# Patient Record
Sex: FEMALE | Race: ASIAN | NOT HISPANIC OR LATINO | Employment: UNEMPLOYED | URBAN - METROPOLITAN AREA
[De-identification: names, ages, dates, MRNs, and addresses within clinical notes are randomized per-mention and may not be internally consistent; named-entity substitution may affect disease eponyms.]

---

## 2020-12-09 ENCOUNTER — RECORDS - HEALTHEAST (OUTPATIENT)
Dept: LAB | Facility: CLINIC | Age: 30
End: 2020-12-09

## 2020-12-09 LAB
ABO/RH(D): NORMAL
ABORH REPEAT: NORMAL
HCG SERPL-ACNC: 31 MLU/ML (ref 0–4)

## 2020-12-10 ENCOUNTER — RECORDS - HEALTHEAST (OUTPATIENT)
Dept: LAB | Facility: CLINIC | Age: 30
End: 2020-12-10

## 2020-12-10 LAB — HCG SERPL-ACNC: 18 MLU/ML (ref 0–4)

## 2020-12-11 ENCOUNTER — RECORDS - HEALTHEAST (OUTPATIENT)
Dept: LAB | Facility: CLINIC | Age: 30
End: 2020-12-11

## 2020-12-11 LAB — HCG SERPL-ACNC: 10 MLU/ML (ref 0–4)

## 2021-03-08 ENCOUNTER — RECORDS - HEALTHEAST (OUTPATIENT)
Dept: LAB | Facility: CLINIC | Age: 31
End: 2021-03-08

## 2021-03-08 LAB — HCG SERPL-ACNC: 8 MLU/ML (ref 0–4)

## 2021-03-10 ENCOUNTER — RECORDS - HEALTHEAST (OUTPATIENT)
Dept: LAB | Facility: CLINIC | Age: 31
End: 2021-03-10

## 2021-03-10 LAB — HCG SERPL-ACNC: 2 MLU/ML (ref 0–4)

## 2021-11-19 DIAGNOSIS — Z11.59 ENCOUNTER FOR SCREENING FOR OTHER VIRAL DISEASES: ICD-10-CM

## 2021-11-23 ENCOUNTER — LAB (OUTPATIENT)
Dept: LAB | Facility: CLINIC | Age: 31
End: 2021-11-23
Payer: COMMERCIAL

## 2021-11-23 DIAGNOSIS — Z11.59 ENCOUNTER FOR SCREENING FOR OTHER VIRAL DISEASES: ICD-10-CM

## 2021-11-23 PROCEDURE — U0003 INFECTIOUS AGENT DETECTION BY NUCLEIC ACID (DNA OR RNA); SEVERE ACUTE RESPIRATORY SYNDROME CORONAVIRUS 2 (SARS-COV-2) (CORONAVIRUS DISEASE [COVID-19]), AMPLIFIED PROBE TECHNIQUE, MAKING USE OF HIGH THROUGHPUT TECHNOLOGIES AS DESCRIBED BY CMS-2020-01-R: HCPCS

## 2021-11-23 PROCEDURE — U0005 INFEC AGEN DETEC AMPLI PROBE: HCPCS

## 2021-11-24 LAB — SARS-COV-2 RNA RESP QL NAA+PROBE: NEGATIVE

## 2021-11-26 ENCOUNTER — HOSPITAL ENCOUNTER (OUTPATIENT)
Dept: GENERAL RADIOLOGY | Facility: CLINIC | Age: 31
Discharge: HOME | End: 2021-11-26
Attending: NURSE PRACTITIONER | Admitting: NURSE PRACTITIONER
Payer: COMMERCIAL

## 2021-11-26 DIAGNOSIS — N97.9 INFERTILITY, FEMALE: ICD-10-CM

## 2021-11-26 PROCEDURE — 255N000002 HC RX 255 OP 636: Performed by: RADIOLOGY

## 2021-11-26 PROCEDURE — 58340 CATHETER FOR HYSTEROGRAPHY: CPT

## 2021-11-26 RX ORDER — IOPAMIDOL 510 MG/ML
50 INJECTION, SOLUTION INTRAVASCULAR ONCE
Status: COMPLETED | OUTPATIENT
Start: 2021-11-26 | End: 2021-11-26

## 2021-11-26 RX ADMIN — IOPAMIDOL 12 ML: 510 INJECTION, SOLUTION INTRAVASCULAR at 14:14

## 2021-12-02 ENCOUNTER — HOSPITAL ENCOUNTER (OUTPATIENT)
Dept: MRI IMAGING | Facility: CLINIC | Age: 31
Discharge: HOME | End: 2021-12-02
Attending: OBSTETRICS & GYNECOLOGY | Admitting: OBSTETRICS & GYNECOLOGY
Payer: COMMERCIAL

## 2021-12-02 DIAGNOSIS — Q51.4 UNICORNATE UTERUS: ICD-10-CM

## 2021-12-02 PROCEDURE — A9585 GADOBUTROL INJECTION: HCPCS | Performed by: OBSTETRICS & GYNECOLOGY

## 2021-12-02 PROCEDURE — 72197 MRI PELVIS W/O & W/DYE: CPT

## 2021-12-02 PROCEDURE — 255N000002 HC RX 255 OP 636: Performed by: OBSTETRICS & GYNECOLOGY

## 2021-12-02 RX ORDER — GADOBUTROL 604.72 MG/ML
6 INJECTION INTRAVENOUS ONCE
Status: COMPLETED | OUTPATIENT
Start: 2021-12-02 | End: 2021-12-02

## 2021-12-02 RX ADMIN — GADOBUTROL 6 ML: 604.72 INJECTION INTRAVENOUS at 09:55

## 2021-12-12 ENCOUNTER — HEALTH MAINTENANCE LETTER (OUTPATIENT)
Age: 31
End: 2021-12-12

## 2021-12-27 ENCOUNTER — LAB REQUISITION (OUTPATIENT)
Dept: LAB | Facility: HOSPITAL | Age: 31
End: 2021-12-27
Payer: COMMERCIAL

## 2021-12-27 ENCOUNTER — APPOINTMENT (OUTPATIENT)
Dept: LAB | Facility: HOSPITAL | Age: 31
End: 2021-12-27
Payer: COMMERCIAL

## 2021-12-27 DIAGNOSIS — N91.2 AMENORRHEA, UNSPECIFIED: ICD-10-CM

## 2021-12-27 LAB
ABO/RH(D): NORMAL
HCG SERPL-ACNC: 709 MLU/ML (ref 0–4)
SPECIMEN EXPIRATION DATE: NORMAL

## 2021-12-27 PROCEDURE — 36415 COLL VENOUS BLD VENIPUNCTURE: CPT | Mod: ORL | Performed by: OBSTETRICS & GYNECOLOGY

## 2021-12-27 PROCEDURE — 84702 CHORIONIC GONADOTROPIN TEST: CPT | Mod: ORL | Performed by: OBSTETRICS & GYNECOLOGY

## 2021-12-27 PROCEDURE — 86901 BLOOD TYPING SEROLOGIC RH(D): CPT | Mod: ORL | Performed by: OBSTETRICS & GYNECOLOGY

## 2021-12-29 ENCOUNTER — LAB (OUTPATIENT)
Dept: LAB | Facility: CLINIC | Age: 31
End: 2021-12-29
Payer: COMMERCIAL

## 2021-12-29 DIAGNOSIS — N91.2 ABSENCE OF MENSTRUATION: Primary | ICD-10-CM

## 2021-12-29 LAB
ABO/RH(D): NORMAL
HCG SERPL-ACNC: 1276 MLU/ML (ref 0–4)
PROGEST SERPL-MCNC: 19.9 NG/ML
SPECIMEN EXPIRATION DATE: NORMAL

## 2021-12-29 PROCEDURE — 84702 CHORIONIC GONADOTROPIN TEST: CPT

## 2021-12-29 PROCEDURE — 84144 ASSAY OF PROGESTERONE: CPT

## 2021-12-29 PROCEDURE — 86901 BLOOD TYPING SEROLOGIC RH(D): CPT

## 2021-12-29 PROCEDURE — 36415 COLL VENOUS BLD VENIPUNCTURE: CPT

## 2022-01-04 LAB
HEPATITIS B SURFACE ANTIGEN (EXTERNAL): NEGATIVE
RUBELLA ANTIBODY IGG (EXTERNAL): NORMAL
VDRL (SYPHILIS) (EXTERNAL): NONREACTIVE

## 2022-02-11 ENCOUNTER — APPOINTMENT (OUTPATIENT)
Dept: ULTRASOUND IMAGING | Facility: CLINIC | Age: 32
End: 2022-02-11
Payer: COMMERCIAL

## 2022-02-11 ENCOUNTER — HOSPITAL ENCOUNTER (EMERGENCY)
Facility: CLINIC | Age: 32
Discharge: HOME | End: 2022-02-11
Admitting: NURSE PRACTITIONER
Payer: COMMERCIAL

## 2022-02-11 VITALS
HEART RATE: 60 BPM | RESPIRATION RATE: 18 BRPM | BODY MASS INDEX: 24.32 KG/M2 | DIASTOLIC BLOOD PRESSURE: 71 MMHG | SYSTOLIC BLOOD PRESSURE: 113 MMHG | TEMPERATURE: 97.4 F | OXYGEN SATURATION: 99 % | HEIGHT: 65 IN | WEIGHT: 146 LBS

## 2022-02-11 DIAGNOSIS — O20.8 SUBCHORIONIC HEMORRHAGE IN FIRST TRIMESTER: ICD-10-CM

## 2022-02-11 DIAGNOSIS — O20.9 FIRST TRIMESTER BLEEDING: ICD-10-CM

## 2022-02-11 LAB
HCG SERPL-ACNC: ABNORMAL MLU/ML (ref 0–4)
HGB BLD-MCNC: 11.6 G/DL (ref 11.7–15.7)

## 2022-02-11 PROCEDURE — 84702 CHORIONIC GONADOTROPIN TEST: CPT | Performed by: NURSE PRACTITIONER

## 2022-02-11 PROCEDURE — 85018 HEMOGLOBIN: CPT | Performed by: NURSE PRACTITIONER

## 2022-02-11 PROCEDURE — 76801 OB US < 14 WKS SINGLE FETUS: CPT

## 2022-02-11 PROCEDURE — 99284 EMERGENCY DEPT VISIT MOD MDM: CPT | Mod: 25

## 2022-02-11 PROCEDURE — 36415 COLL VENOUS BLD VENIPUNCTURE: CPT | Performed by: NURSE PRACTITIONER

## 2022-02-11 RX ORDER — PROGESTERONE 100 MG/1
CAPSULE ORAL
Status: ON HOLD | COMMUNITY
Start: 2021-12-28 | End: 2022-06-03

## 2022-02-11 ASSESSMENT — ENCOUNTER SYMPTOMS
VOMITING: 0
NAUSEA: 0
FEVER: 0

## 2022-02-11 ASSESSMENT — MIFFLIN-ST. JEOR: SCORE: 1363.13

## 2022-02-11 NOTE — ED NOTES
Patient discharged home with AVS. Will follow up with OB next week. All questions answered. Vitals stable on RA.

## 2022-02-11 NOTE — ED TRIAGE NOTES
"Pt arrives to ED with c/o vaginal bleeding with no cramping and no abdominal pain. Pt is 11 weeks pregnant with two previous miscarriages. Pt endorses to \"gush of blood with clots at 11pm\" Pt states she woke up with her pajamas soaked in blood. Since then pt endorses to only needed 1 pad since 11pm. Pt called her obgyn and told her to go to the ER for an ultrasound.   "

## 2022-02-11 NOTE — ED PROVIDER NOTES
EMERGENCY DEPARTMENT ENCOUNTER      NAME: Gil Chaudhari  AGE: 32 year old female  YOB: 1990  MRN: [id number]  EVALUATION DATE & TIME: 2022 10:57 AM    PCP: Toppin, Barbara Carnett    ED PROVIDER: ORNI Roger, CNP      Chief Complaint   Patient presents with     Vaginal Bleeding         FINAL IMPRESSION:  1. First trimester bleeding    2. Subchorionic hemorrhage in first trimester          ED COURSE & MEDICAL DECISION MAKIN:08 AM I met with the patient, obtained history, performed an initial exam, and discussed options and plan for treatment here in the ED.  12:35 PM updated patient with results. Edward Jacinto and Joe - OB.  12:43 PM Case discussed with Dr Jacinto.  12:48 PM updated patient    Pertinent Labs & Imaging studies reviewed. (See chart for details)  32 year old female presents to the Emergency Department for evaluation of [place] extra vaginal bleeding.  No associated pain.  Has been diagnosed with a subchorionic hemorrhage previously in this pregnancy.  Had changed her pad after approximately 8 hours of bleeding though had heavier bleeding last night.  She appears well and is hemodynamically stable.  Slight anemia.  hCG appropriate and ultrasound reassuring.  Continues to show some small subchorionic hemorrhage.  Case discussed with OB.  Overall, work-up at this time is reassuring.  Discussed pelvic rest and the need for OB follow-up.  Advised to call today to schedule an appointment for next week.  Advised to return for any new/worsening symptoms or other concerns.    At the conclusion of the encounter I discussed the results of all of the tests and the disposition. The questions were answered. The patient or family acknowledged understanding and was agreeable with the care plan.       MEDICATIONS GIVEN IN THE EMERGENCY:  Medications - No data to display    NEW PRESCRIPTIONS STARTED AT TODAY'S ER VISIT  New Prescriptions    No medications on file             =================================================================    HPI    Patient information was obtained from: patient    Use of Intrepreter: N/A        Gil Chaudhari is a 32 year old female with a history of miscarriage who presents for devaluation of vagina bleeding. G 3 P 0020. Currently around 11 weeks pregnant. Has noted spotting for the past 6 days. Was evaluated by her OB in clinic and diagnosed with ROMINA. Last night had heavy bleeding that soaked her pajamas. Bleeding today is lighter. No associated pelvic pain, fever, nausea, vomiting or other complaints. Called her OB clinic and advised to seek emergent evaluation.      REVIEW OF SYSTEMS   Review of Systems   Constitutional: Negative for fever.   Gastrointestinal: Negative for nausea and vomiting.   Genitourinary: Positive for vaginal bleeding. Negative for vaginal pain.   All other systems reviewed and are negative.      PAST MEDICAL HISTORY:  No past medical history on file.    PAST SURGICAL HISTORY:  No past surgical history on file.        CURRENT MEDICATIONS:    Prior to Admission Medications   Prescriptions Last Dose Informant Patient Reported? Taking?   progesterone (PROMETRIUM) 100 MG capsule   Yes No      Facility-Administered Medications: None           ALLERGIES:  No Known Allergies    FAMILY HISTORY:  No family history on file.    SOCIAL HISTORY:   Social History     Socioeconomic History     Marital status:      Spouse name: Not on file     Number of children: Not on file     Years of education: Not on file     Highest education level: Not on file   Occupational History     Not on file   Tobacco Use     Smoking status: Not on file     Smokeless tobacco: Not on file   Substance and Sexual Activity     Alcohol use: Not on file     Drug use: Not on file     Sexual activity: Not on file   Other Topics Concern     Not on file   Social History Narrative     Not on file     Social Determinants of Health     Financial Resource Strain: Not on  "file   Food Insecurity: Not on file   Transportation Needs: Not on file   Physical Activity: Not on file   Stress: Not on file   Social Connections: Not on file   Intimate Partner Violence: Not on file   Housing Stability: Not on file         VITALS:  Patient Vitals for the past 24 hrs:   BP Temp Temp src Pulse Resp SpO2 Height Weight   02/11/22 1212 113/71 -- -- 60 18 99 % -- --   02/11/22 1055 125/87 97  F (36.1  C) Temporal 94 16 100 % 1.651 m (5' 5\") 66.2 kg (146 lb)       PHYSICAL EXAM    Constitutional:  Well developed, well nourished, no acute distress  EYES: Conjunctivae clear  HENT:  Atraumatic, normocephalic  Respiratory:  No respiratory distress, normal breath sounds  Cardiovascular:  Normal rate, normal rhythm, no murmurs  GI:  Soft, nondistended, nontender, no palpable masses, no rebound, no guarding   Integument: Warm, Dry  Neurologic:  Alert & oriented x 3              LAB:  All pertinent labs reviewed and interpreted.  Results for orders placed or performed during the hospital encounter of 02/11/22   US OB < 14 Weeks Single    Impression    IMPRESSION:   1.  Single living intrauterine gestation at 11 weeks 0 days, EDC 09/02/2022.  2.  Fundal 5.7 cm uterine leiomyoma.  3.  Small subchorionic hemorrhage.       Result Value Ref Range    Hemoglobin 11.6 (L) 11.7 - 15.7 g/dL   HCG quantitative pregnancy (blood)   Result Value Ref Range    hCG Quantitative 148,282 (H) 0 - 4 mlU/mL       RADIOLOGY:  Reviewed all pertinent imaging. Please see official radiology report.  US OB < 14 Weeks Single   Final Result   IMPRESSION:    1.  Single living intrauterine gestation at 11 weeks 0 days, EDC 09/02/2022.   2.  Fundal 5.7 cm uterine leiomyoma.   3.  Small subchorionic hemorrhage.                  PROCEDURES:   None    RONI Roger, CNP  Emergency Medicine  [facility] EMERGENCY ROOM  [address] [address] [phone]  Dept: " [phone]         Agustin Mendoza, RONI CNP  02/11/22 1249       Agustin Mendoza, RONI CNP  02/11/22 1247

## 2022-02-11 NOTE — DISCHARGE INSTRUCTIONS
Discharge Instructions  Vaginal Bleeding in Pregnancy    Bleeding in early pregnancy can be a sign of a miscarriage in process or an abnormal pregnancy, but often is innocent and the pregnancy will continue normally. We may do blood pregnancy tests and ultrasound to try to determine what is causing the bleeding in your case, but sometimes we can't tell and need to follow you with time, more blood tests, and another ultrasound.     Return to the Emergency Department if:  You have severe abdominal or pelvic pain.  You faint, or feel lightheaded or dizzy.   Your bleeding is gets much worse, and is heavier than a heavy period or if you pass any blood clots larger than a quarter.  You pass any tissue--solid material that doesn't appear smooth and even like a blood clot. If you pass tissue, save it (even if you have to pull it out of the toilet) and put it in a plastic bag or jar and bring it in.    You have a fever of 100.5 degrees or higher.    You need to see your regular doctor, or an OB/GYN doctor next week. Call to schedule.  You should not have sex or put anything in your vagina.  Avoid heavy lifting or any strenuous exercise     Facts about miscarriage: We hope you don't have a miscarriage, but if you do, here are important things to know:  Early miscarriage is very common, and having one miscarriage doesn't mean you will have problems with another pregnancy.  Nothing you did caused it. Taking medicine, drinking alcohol, having sex, exercising, or falling down won't cause a miscarriage.         Remember that you can always come back to the Emergency Department if you are not able to see your regular doctor in the amount of time listed above, if you get any new symptoms, or if there is anything that worries you.

## 2022-05-12 ENCOUNTER — PRE VISIT (OUTPATIENT)
Dept: MATERNAL FETAL MEDICINE | Facility: CLINIC | Age: 32
End: 2022-05-12
Payer: COMMERCIAL

## 2022-05-12 ENCOUNTER — TRANSCRIBE ORDERS (OUTPATIENT)
Dept: MATERNAL FETAL MEDICINE | Facility: CLINIC | Age: 32
End: 2022-05-12
Payer: COMMERCIAL

## 2022-05-12 DIAGNOSIS — O26.90 PREGNANCY RELATED CONDITION, ANTEPARTUM: Primary | ICD-10-CM

## 2022-05-13 ENCOUNTER — OFFICE VISIT (OUTPATIENT)
Dept: MATERNAL FETAL MEDICINE | Facility: CLINIC | Age: 32
End: 2022-05-13
Attending: OBSTETRICS & GYNECOLOGY
Payer: COMMERCIAL

## 2022-05-13 ENCOUNTER — HOSPITAL ENCOUNTER (OUTPATIENT)
Dept: ULTRASOUND IMAGING | Facility: CLINIC | Age: 32
Discharge: HOME | End: 2022-05-13
Attending: OBSTETRICS & GYNECOLOGY
Payer: COMMERCIAL

## 2022-05-13 DIAGNOSIS — O36.5990 PREGNANCY AFFECTED BY FETAL GROWTH RESTRICTION: Primary | ICD-10-CM

## 2022-05-13 DIAGNOSIS — O26.90 PREGNANCY RELATED CONDITION, ANTEPARTUM: ICD-10-CM

## 2022-05-13 PROCEDURE — 76811 OB US DETAILED SNGL FETUS: CPT

## 2022-05-13 PROCEDURE — 59025 FETAL NON-STRESS TEST: CPT | Mod: 26 | Performed by: OBSTETRICS & GYNECOLOGY

## 2022-05-13 PROCEDURE — 76820 UMBILICAL ARTERY ECHO: CPT | Mod: 26 | Performed by: OBSTETRICS & GYNECOLOGY

## 2022-05-13 PROCEDURE — 76811 OB US DETAILED SNGL FETUS: CPT | Mod: 26 | Performed by: OBSTETRICS & GYNECOLOGY

## 2022-05-13 PROCEDURE — 99202 OFFICE O/P NEW SF 15 MIN: CPT | Mod: 25 | Performed by: OBSTETRICS & GYNECOLOGY

## 2022-05-13 PROCEDURE — 59025 FETAL NON-STRESS TEST: CPT

## 2022-05-13 NOTE — PROGRESS NOTES
"Please see \"Imaging\" tab under \"Chart Review\" for details of today's visit.    Mary Marx MD PhD  Maternal Fetal Medicine     "

## 2022-05-19 ENCOUNTER — OFFICE VISIT (OUTPATIENT)
Dept: MATERNAL FETAL MEDICINE | Facility: HOSPITAL | Age: 32
End: 2022-05-19
Attending: OBSTETRICS & GYNECOLOGY
Payer: COMMERCIAL

## 2022-05-19 ENCOUNTER — ANCILLARY PROCEDURE (OUTPATIENT)
Dept: ULTRASOUND IMAGING | Facility: HOSPITAL | Age: 32
End: 2022-05-19
Attending: OBSTETRICS & GYNECOLOGY
Payer: COMMERCIAL

## 2022-05-19 DIAGNOSIS — O36.5990 PREGNANCY AFFECTED BY FETAL GROWTH RESTRICTION: ICD-10-CM

## 2022-05-19 PROCEDURE — 99207 PR NO CHARGE LOS: CPT | Performed by: OBSTETRICS & GYNECOLOGY

## 2022-05-19 PROCEDURE — 76820 UMBILICAL ARTERY ECHO: CPT | Mod: 26 | Performed by: OBSTETRICS & GYNECOLOGY

## 2022-05-19 PROCEDURE — 76815 OB US LIMITED FETUS(S): CPT

## 2022-05-19 PROCEDURE — 76815 OB US LIMITED FETUS(S): CPT | Mod: 26 | Performed by: OBSTETRICS & GYNECOLOGY

## 2022-05-19 PROCEDURE — 59025 FETAL NON-STRESS TEST: CPT

## 2022-05-19 PROCEDURE — 59025 FETAL NON-STRESS TEST: CPT | Mod: 26 | Performed by: OBSTETRICS & GYNECOLOGY

## 2022-05-19 NOTE — PROGRESS NOTES
Please see the imaging tab for details of the ultrasound performed today.    Yesi Voss MD  Specialist in Maternal-Fetal Medicine

## 2022-05-27 ENCOUNTER — OFFICE VISIT (OUTPATIENT)
Dept: MATERNAL FETAL MEDICINE | Facility: HOSPITAL | Age: 32
End: 2022-05-27
Attending: OBSTETRICS & GYNECOLOGY
Payer: COMMERCIAL

## 2022-05-27 ENCOUNTER — ANCILLARY PROCEDURE (OUTPATIENT)
Dept: ULTRASOUND IMAGING | Facility: HOSPITAL | Age: 32
End: 2022-05-27
Attending: OBSTETRICS & GYNECOLOGY
Payer: COMMERCIAL

## 2022-05-27 DIAGNOSIS — O36.5990 PREGNANCY AFFECTED BY FETAL GROWTH RESTRICTION: ICD-10-CM

## 2022-05-27 PROCEDURE — 76815 OB US LIMITED FETUS(S): CPT

## 2022-05-27 PROCEDURE — 76815 OB US LIMITED FETUS(S): CPT | Mod: 26 | Performed by: OBSTETRICS & GYNECOLOGY

## 2022-05-27 PROCEDURE — 59025 FETAL NON-STRESS TEST: CPT | Mod: 26 | Performed by: OBSTETRICS & GYNECOLOGY

## 2022-05-27 PROCEDURE — 59025 FETAL NON-STRESS TEST: CPT

## 2022-05-27 PROCEDURE — 76820 UMBILICAL ARTERY ECHO: CPT | Mod: 26 | Performed by: OBSTETRICS & GYNECOLOGY

## 2022-05-27 PROCEDURE — 99207 PR NO CHARGE LOS: CPT | Performed by: OBSTETRICS & GYNECOLOGY

## 2022-05-27 NOTE — PROGRESS NOTES
Please see full imaging report from [employer] program under imaging tab.    Vandana Jones MD  Maternal Fetal Medicine

## 2022-06-03 ENCOUNTER — OFFICE VISIT (OUTPATIENT)
Dept: MATERNAL FETAL MEDICINE | Facility: HOSPITAL | Age: 32
End: 2022-06-03
Attending: OBSTETRICS & GYNECOLOGY
Payer: COMMERCIAL

## 2022-06-03 ENCOUNTER — ANCILLARY PROCEDURE (OUTPATIENT)
Dept: ULTRASOUND IMAGING | Facility: HOSPITAL | Age: 32
End: 2022-06-03
Attending: OBSTETRICS & GYNECOLOGY
Payer: COMMERCIAL

## 2022-06-03 ENCOUNTER — HOSPITAL ENCOUNTER (OUTPATIENT)
Facility: HOSPITAL | Age: 32
Discharge: HOME | End: 2022-06-04
Attending: OBSTETRICS & GYNECOLOGY | Admitting: OBSTETRICS & GYNECOLOGY
Payer: COMMERCIAL

## 2022-06-03 VITALS — DIASTOLIC BLOOD PRESSURE: 76 MMHG | SYSTOLIC BLOOD PRESSURE: 111 MMHG | HEART RATE: 92 BPM

## 2022-06-03 DIAGNOSIS — O36.5990 PREGNANCY AFFECTED BY FETAL GROWTH RESTRICTION: ICD-10-CM

## 2022-06-03 PROBLEM — Z36.89 ENCOUNTER FOR TRIAGE IN PREGNANT PATIENT: Status: ACTIVE | Noted: 2022-06-03

## 2022-06-03 PROCEDURE — 76816 OB US FOLLOW-UP PER FETUS: CPT | Mod: 26 | Performed by: OBSTETRICS & GYNECOLOGY

## 2022-06-03 PROCEDURE — 59025 FETAL NON-STRESS TEST: CPT | Mod: 26 | Performed by: OBSTETRICS & GYNECOLOGY

## 2022-06-03 PROCEDURE — 96372 THER/PROPH/DIAG INJ SC/IM: CPT | Performed by: OBSTETRICS & GYNECOLOGY

## 2022-06-03 PROCEDURE — 59025 FETAL NON-STRESS TEST: CPT

## 2022-06-03 PROCEDURE — 76816 OB US FOLLOW-UP PER FETUS: CPT

## 2022-06-03 PROCEDURE — 99207 PR NO CHARGE LOS: CPT | Performed by: OBSTETRICS & GYNECOLOGY

## 2022-06-03 PROCEDURE — 76820 UMBILICAL ARTERY ECHO: CPT | Mod: 26 | Performed by: OBSTETRICS & GYNECOLOGY

## 2022-06-03 PROCEDURE — 250N000011 HC RX IP 250 OP 636: Performed by: OBSTETRICS & GYNECOLOGY

## 2022-06-03 RX ORDER — BETAMETHASONE SODIUM PHOSPHATE AND BETAMETHASONE ACETATE 3; 3 MG/ML; MG/ML
12 INJECTION, SUSPENSION INTRA-ARTICULAR; INTRALESIONAL; INTRAMUSCULAR; SOFT TISSUE EVERY 24 HOURS
Status: COMPLETED | OUTPATIENT
Start: 2022-06-03 | End: 2022-06-04

## 2022-06-03 RX ORDER — LIDOCAINE 40 MG/G
CREAM TOPICAL
Status: DISCONTINUED | OUTPATIENT
Start: 2022-06-03 | End: 2022-06-04 | Stop reason: HOSPADM

## 2022-06-03 RX ORDER — BIOTIN 1 MG
1000 TABLET ORAL DAILY
Status: ON HOLD | COMMUNITY
End: 2022-08-05

## 2022-06-03 RX ORDER — ASPIRIN 81 MG/1
81 TABLET, CHEWABLE ORAL DAILY
Status: ON HOLD | COMMUNITY
End: 2022-08-05

## 2022-06-03 RX ADMIN — BETAMETHASONE ACETATE AND BETAMETHASONE SODIUM PHOSPHATE 12 MG: 3; 3 INJECTION, SUSPENSION INTRA-ARTICULAR; INTRALESIONAL; INTRAMUSCULAR; SOFT TISSUE at 19:07

## 2022-06-03 NOTE — H&P
[facility] Labor and Delivery History and Physical    Gil Chaudhari MRN# [id number]   Age: 32 year old YOB: 1990     Date of Admission:  6/3/2022    Primary care provider: Toppin, Barbara Carnett           Chief Complaint:   Gil Chaudhari is a 32 year old female who is 27w4d pregnant who is being followed by Dr. Diaz.  The patient had an ultrasound at maternal-fetal medicine today showing fetal growth restriction.  There was a normal umbilical Doppler and a normal amniotic fluid volume.  She denies any abdominal pain or bleeding or leakage of fluid.  She endorses good fetal movement.  At maternal-fetal medicine this afternoon she was being monitored and there was a deceleration was noted so the patient was sent to labor and delivery for prolonged monitoring and steroids.          Pregnancy history:     OBSTETRIC HISTORY:    OB History    Para Term  AB Living   3 0 0 0 2 0   SAB IAB Ectopic Multiple Live Births   2 0 0 0 0      # Outcome Date GA Lbr Sha/2nd Weight Sex Delivery Anes PTL Lv   3 Current            2 SAB      SAB      1 SAB      SAB          EDC: Estimated Date of Delivery: 22    Prenatal Labs:   Lab Results   Component Value Date    HGB 11.6 (L) 2022       GBS Status:   No results found for: GBS    Active Problem List  Patient Active Problem List   Diagnosis     Encounter for triage in pregnant patient       Medication Prior to Admission  Medications Prior to Admission   Medication Sig Dispense Refill Last Dose     progesterone (PROMETRIUM) 100 MG capsule       .        Maternal Past Medical History:   No past medical history on file.                    Family History:   This patient has no significant family history            Social History:   This patient has no significant social history         Review of Systems:   CONSTITUTIONAL: NEGATIVE for fever, chills, change in weight  ENT/MOUTH: NEGATIVE for ear, mouth and throat problems  RESP:  NEGATIVE for significant cough or SOB  CV: NEGATIVE for chest pain, palpitations or peripheral edema          Physical Exam:   Vitals were reviewed  Temp: 98.2  F (36.8  C) Temp src: Oral BP: 118/77 Pulse: 86   Resp: 14   O2 Device: None (Room air)    Lungs:   No increased work of breathing, good air exchange, clear to auscultation bilaterally, no crackles or wheezing     Cardiovascular:   regular rate and rhythm     Abdomen:   No scars, normal bowel sounds, soft, non-distended, non-tender, no masses palpated, no hepatosplenomegally      Cervical exam is deferred at there is no leakage of fluid  Presentation:Breech  Fetal Heart Rate Tracins with some variability noted.  No decelerations are seen  Tocometer: No contractions                       Assessment:   Gil Chaudhari is a 27w4d pregnant female admitted with IUGR.          Plan:   We will observe overnight with continuous monitoring and give steroids per recommendations from maternal-fetal medicine.  We would only move towards delivery if there was an ominous tracing.  At that point  section will be needed.  If  it looks like we are going to be needing to go towards delivery then magnesium sulfate would be given for CP prevention.  If the baby has a reassuring tracing over the course of steroids then per M she can be followed twice weekly in their office.  I discussed the plan with the patient and she is in agreement.  All questions were answered.    Haile Barajas MD

## 2022-06-03 NOTE — NURSING NOTE
NST Performed due to severe fetal growth restriction.   reviewed efm tracing. See NST/BPP Doc Flowsheet tab.    Today's NST was non reassuring for gestational age. Had prolonged decel x4 min with unknown leo d/t broken tracing. Rebound baseline was elevated with decreased variability. Will go to L&D for extended monitoring.

## 2022-06-03 NOTE — PROGRESS NOTES
Please refer to ultrasound report under 'Imaging' Studies of 'Chart Review' tabs.    Marin Mccray M.D.

## 2022-06-04 ENCOUNTER — HOSPITAL ENCOUNTER (OUTPATIENT)
Facility: HOSPITAL | Age: 32
End: 2022-06-04
Admitting: OBSTETRICS & GYNECOLOGY
Payer: COMMERCIAL

## 2022-06-04 VITALS
DIASTOLIC BLOOD PRESSURE: 64 MMHG | WEIGHT: 160.94 LBS | SYSTOLIC BLOOD PRESSURE: 106 MMHG | BODY MASS INDEX: 26.81 KG/M2 | RESPIRATION RATE: 18 BRPM | HEIGHT: 65 IN | HEART RATE: 93 BPM | TEMPERATURE: 98 F

## 2022-06-04 PROCEDURE — G0463 HOSPITAL OUTPT CLINIC VISIT: HCPCS

## 2022-06-04 PROCEDURE — 250N000011 HC RX IP 250 OP 636: Performed by: OBSTETRICS & GYNECOLOGY

## 2022-06-04 PROCEDURE — 96372 THER/PROPH/DIAG INJ SC/IM: CPT | Performed by: OBSTETRICS & GYNECOLOGY

## 2022-06-04 RX ORDER — CALCIUM CARBONATE 500 MG/1
1000 TABLET, CHEWABLE ORAL 3 TIMES DAILY PRN
Status: DISCONTINUED | OUTPATIENT
Start: 2022-06-04 | End: 2022-06-04 | Stop reason: HOSPADM

## 2022-06-04 RX ADMIN — BETAMETHASONE ACETATE AND BETAMETHASONE SODIUM PHOSPHATE 12 MG: 3; 3 INJECTION, SUSPENSION INTRA-ARTICULAR; INTRALESIONAL; INTRAMUSCULAR; SOFT TISSUE at 19:30

## 2022-06-04 NOTE — PROGRESS NOTES
FHT in 140-150 range. No decelerations. Some moderate varibiality but mostly minimal.   Abd NT and no bleeding.   Continue current course. Will deliver at this gestational age for recurrent decelerations or tachycardia or bradycardia.

## 2022-06-04 NOTE — PROGRESS NOTES
Pt sleeping and reported no contractions. Baby currently staying on the monitor. Category 1 tracing. No decels noted. Vital signs stable.

## 2022-06-04 NOTE — PROGRESS NOTES
Heather and her  present to [facility] after a clinic visit at [facility].  [facility] called and reported that upon monitoring they noted a deceleration, requested further monitoring.  Pregnancy complicated with subchorionic hemorrhage, unicornuate uterus, and uterine fibroid.      Dr Barajas in to see patient and review plan of care at 1730 after making plan with Dr Rollins from [facility].  Reviewed need for continuous monitoring for at least 6 hours, possibly longer, probably overnight.  Also plan for Betamethasone injection.  Patient and  agreeable to plan.      No contractions per Heather, no vaginal bleeding or change in vaginal discharge.  Placed on monitor, encouraged to eat and rest as able.

## 2022-06-04 NOTE — PROGRESS NOTES
"Labor and Delivery Progress Note    Gil Chaudhari MRN# [id number]   Age: 32 year old YOB: 1990           Subjective:   The patient is feeling well, lots of fetal movement           Objective:   Patient Vitals for the past 24 hrs:   BP Temp Temp src Pulse Resp Height Weight BMI (Calculated) Oximeter Heart Rate   22 0025 100/60 98.3  F (36.8  C) Oral -- 16 -- -- -- 87 bpm   22 2235 133/80 97.9  F (36.6  C) Oral 85 16 -- -- -- --   22 1712 118/77 98.2  F (36.8  C) Oral 86 14 1.651 m (5' 5\") 73 kg (160 lb 15 oz) 26.78 86 bpm         Fetal Heart Rate:    Monitor: external US    Variability: moderate (amplitude range 6 to 25 bpm)    Baseline Rate: normal range    Fetal Heart Rate Tracing: overall category 1 with occasional variable decelerations that resolve with repositioning          Assessment:   Gil Chaudhari is a 32 year old  who is 27w5d here with IUGR and decelerations on monitoring yesterday at [facility].          Plan:   Will continue observation until second dose of betamethasone tonight at 19:00.  If FHR appropriate, will discharge to home.  She has a follow up with [facility] on 22 already.      BAKARI MARK    "

## 2022-06-04 NOTE — PROGRESS NOTES
Pt doing well. Per patient she will have occasional cramp every now and then but nothing painful.  No contraction noted on monitor.    Patient transferred to room 22 for overnight stay.      Report given to Vin BARTLETT RN

## 2022-06-05 NOTE — DISCHARGE SUMMARY
DISCHARGE SUMMARY      Patient Name:  Gil Chaudhari  :      1990  MRN:      [id number]    Admission Date: 6/3/2022  Discharge Date: 2022    Patient Active Problem List   Diagnosis     Encounter for triage in pregnant patient       Conditions Complicating Pregnancy: IUGR, fetal decelerations    Primary Procedure performed: fetal monitoring, betamethasone  Other Procedures performed: none    Condition at discharge:  Stable; FHR overall category 1 with occasional variable decelerations, not repetitive     Discharge Plan:     Follow-up with Primary Obstetrician in 1 week, follow up with MFM on     Instructions:   Activity as tolerated   Regular diet   Medications: See Med Rec    Date:  22  Time:  7:14 PM

## 2022-06-06 ENCOUNTER — ANCILLARY PROCEDURE (OUTPATIENT)
Dept: ULTRASOUND IMAGING | Facility: HOSPITAL | Age: 32
End: 2022-06-06
Attending: OBSTETRICS & GYNECOLOGY
Payer: COMMERCIAL

## 2022-06-06 ENCOUNTER — OFFICE VISIT (OUTPATIENT)
Dept: MATERNAL FETAL MEDICINE | Facility: HOSPITAL | Age: 32
End: 2022-06-06
Attending: OBSTETRICS & GYNECOLOGY
Payer: COMMERCIAL

## 2022-06-06 DIAGNOSIS — O36.5990 PREGNANCY AFFECTED BY FETAL GROWTH RESTRICTION: ICD-10-CM

## 2022-06-06 PROCEDURE — 76820 UMBILICAL ARTERY ECHO: CPT | Mod: 26 | Performed by: OBSTETRICS & GYNECOLOGY

## 2022-06-06 PROCEDURE — 59025 FETAL NON-STRESS TEST: CPT

## 2022-06-06 PROCEDURE — 76815 OB US LIMITED FETUS(S): CPT | Mod: 26 | Performed by: OBSTETRICS & GYNECOLOGY

## 2022-06-06 PROCEDURE — 76815 OB US LIMITED FETUS(S): CPT

## 2022-06-06 PROCEDURE — 59025 FETAL NON-STRESS TEST: CPT | Mod: 26 | Performed by: OBSTETRICS & GYNECOLOGY

## 2022-06-06 PROCEDURE — 99207 PR NO CHARGE LOS: CPT | Performed by: OBSTETRICS & GYNECOLOGY

## 2022-06-09 ENCOUNTER — OFFICE VISIT (OUTPATIENT)
Dept: MATERNAL FETAL MEDICINE | Facility: CLINIC | Age: 32
End: 2022-06-09
Attending: OBSTETRICS & GYNECOLOGY
Payer: COMMERCIAL

## 2022-06-09 ENCOUNTER — HOSPITAL ENCOUNTER (OUTPATIENT)
Dept: ULTRASOUND IMAGING | Facility: CLINIC | Age: 32
Discharge: HOME | End: 2022-06-09
Attending: OBSTETRICS & GYNECOLOGY
Payer: COMMERCIAL

## 2022-06-09 DIAGNOSIS — O36.5990 PREGNANCY AFFECTED BY FETAL GROWTH RESTRICTION: ICD-10-CM

## 2022-06-09 PROCEDURE — 76815 OB US LIMITED FETUS(S): CPT

## 2022-06-09 PROCEDURE — 76818 FETAL BIOPHYS PROFILE W/NST: CPT | Mod: 26 | Performed by: OBSTETRICS & GYNECOLOGY

## 2022-06-09 PROCEDURE — 76818 FETAL BIOPHYS PROFILE W/NST: CPT

## 2022-06-09 PROCEDURE — 76820 UMBILICAL ARTERY ECHO: CPT | Mod: 26 | Performed by: OBSTETRICS & GYNECOLOGY

## 2022-06-10 NOTE — PROGRESS NOTES
"Please see \"Imaging\" tab under \"Chart Review\" for details of today's ultrasound.    Agustin Burns M.D.  Specialist in Maternal-Fetal Medicine     "

## 2022-06-13 ENCOUNTER — ANCILLARY PROCEDURE (OUTPATIENT)
Dept: ULTRASOUND IMAGING | Facility: HOSPITAL | Age: 32
End: 2022-06-13
Attending: OBSTETRICS & GYNECOLOGY
Payer: COMMERCIAL

## 2022-06-13 ENCOUNTER — OFFICE VISIT (OUTPATIENT)
Dept: MATERNAL FETAL MEDICINE | Facility: HOSPITAL | Age: 32
End: 2022-06-13
Attending: OBSTETRICS & GYNECOLOGY
Payer: COMMERCIAL

## 2022-06-13 DIAGNOSIS — O36.5990 PREGNANCY AFFECTED BY FETAL GROWTH RESTRICTION: ICD-10-CM

## 2022-06-13 PROCEDURE — 59025 FETAL NON-STRESS TEST: CPT | Mod: 26 | Performed by: OBSTETRICS & GYNECOLOGY

## 2022-06-13 PROCEDURE — 76820 UMBILICAL ARTERY ECHO: CPT | Mod: 26 | Performed by: OBSTETRICS & GYNECOLOGY

## 2022-06-13 PROCEDURE — 59025 FETAL NON-STRESS TEST: CPT

## 2022-06-13 PROCEDURE — 76820 UMBILICAL ARTERY ECHO: CPT

## 2022-06-13 PROCEDURE — 76815 OB US LIMITED FETUS(S): CPT | Mod: 26 | Performed by: OBSTETRICS & GYNECOLOGY

## 2022-06-17 ENCOUNTER — ANCILLARY PROCEDURE (OUTPATIENT)
Dept: ULTRASOUND IMAGING | Facility: HOSPITAL | Age: 32
End: 2022-06-17
Attending: OBSTETRICS & GYNECOLOGY
Payer: COMMERCIAL

## 2022-06-17 ENCOUNTER — OFFICE VISIT (OUTPATIENT)
Dept: MATERNAL FETAL MEDICINE | Facility: HOSPITAL | Age: 32
End: 2022-06-17
Attending: OBSTETRICS & GYNECOLOGY
Payer: COMMERCIAL

## 2022-06-17 DIAGNOSIS — O36.5990 PREGNANCY AFFECTED BY FETAL GROWTH RESTRICTION: ICD-10-CM

## 2022-06-17 DIAGNOSIS — O36.5990 PREGNANCY AFFECTED BY FETAL GROWTH RESTRICTION: Primary | ICD-10-CM

## 2022-06-17 PROCEDURE — 76815 OB US LIMITED FETUS(S): CPT | Mod: 26 | Performed by: OBSTETRICS & GYNECOLOGY

## 2022-06-17 PROCEDURE — 59025 FETAL NON-STRESS TEST: CPT

## 2022-06-17 PROCEDURE — 59025 FETAL NON-STRESS TEST: CPT | Mod: 26 | Performed by: OBSTETRICS & GYNECOLOGY

## 2022-06-17 PROCEDURE — 76820 UMBILICAL ARTERY ECHO: CPT | Mod: 26 | Performed by: OBSTETRICS & GYNECOLOGY

## 2022-06-17 PROCEDURE — 76820 UMBILICAL ARTERY ECHO: CPT

## 2022-06-17 PROCEDURE — 99207 PR NO CHARGE LOS: CPT | Performed by: OBSTETRICS & GYNECOLOGY

## 2022-06-17 NOTE — PROGRESS NOTES
"Please see \"Imaging\" tab under \"Chart Review\" for details of today's visit.    Grant Arceo    "

## 2022-06-20 ENCOUNTER — TELEPHONE (OUTPATIENT)
Dept: ENDOCRINOLOGY | Facility: CLINIC | Age: 32
End: 2022-06-20

## 2022-06-20 ENCOUNTER — MEDICAL CORRESPONDENCE (OUTPATIENT)
Dept: HEALTH INFORMATION MANAGEMENT | Facility: CLINIC | Age: 32
End: 2022-06-20

## 2022-06-20 NOTE — TELEPHONE ENCOUNTER
[facility] Call Center    Phone Message    May a detailed message be left on voicemail: yes     Reason for Call: Other: New Gestational Diabetes patient needing scheduling .  Per patient orders to be sent over today from OBGYN but was instructed to call right away looking to be scheduled in [place]. No avail ible appointments with in 3 days please review and follow up with patient. Thank you.     Action Taken: Other: ENDO    Travel Screening: Not Applicable

## 2022-06-21 ENCOUNTER — HOSPITAL ENCOUNTER (INPATIENT)
Facility: HOSPITAL | Age: 32
LOS: 4 days | Discharge: HOME | DRG: 833 | End: 2022-06-27
Attending: OBSTETRICS & GYNECOLOGY | Admitting: OBSTETRICS & GYNECOLOGY
Payer: COMMERCIAL

## 2022-06-21 ENCOUNTER — ANCILLARY PROCEDURE (OUTPATIENT)
Dept: ULTRASOUND IMAGING | Facility: HOSPITAL | Age: 32
End: 2022-06-21
Attending: OBSTETRICS & GYNECOLOGY
Payer: COMMERCIAL

## 2022-06-21 ENCOUNTER — OFFICE VISIT (OUTPATIENT)
Dept: MATERNAL FETAL MEDICINE | Facility: HOSPITAL | Age: 32
End: 2022-06-21
Attending: OBSTETRICS & GYNECOLOGY
Payer: COMMERCIAL

## 2022-06-21 DIAGNOSIS — O36.5990 PREGNANCY AFFECTED BY FETAL GROWTH RESTRICTION: ICD-10-CM

## 2022-06-21 PROBLEM — E11.9 DIABETES (H): Status: ACTIVE | Noted: 2022-06-21

## 2022-06-21 LAB — SARS-COV-2 RNA RESP QL NAA+PROBE: NEGATIVE

## 2022-06-21 PROCEDURE — 76820 UMBILICAL ARTERY ECHO: CPT | Mod: 26 | Performed by: OBSTETRICS & GYNECOLOGY

## 2022-06-21 PROCEDURE — 87653 STREP B DNA AMP PROBE: CPT | Performed by: OBSTETRICS & GYNECOLOGY

## 2022-06-21 PROCEDURE — 76818 FETAL BIOPHYS PROFILE W/NST: CPT | Mod: 26 | Performed by: OBSTETRICS & GYNECOLOGY

## 2022-06-21 PROCEDURE — 87635 SARS-COV-2 COVID-19 AMP PRB: CPT | Performed by: OBSTETRICS & GYNECOLOGY

## 2022-06-21 PROCEDURE — 76818 FETAL BIOPHYS PROFILE W/NST: CPT

## 2022-06-21 PROCEDURE — 96372 THER/PROPH/DIAG INJ SC/IM: CPT | Performed by: OBSTETRICS & GYNECOLOGY

## 2022-06-21 PROCEDURE — 99207 PR NO CHARGE LOS: CPT | Performed by: OBSTETRICS & GYNECOLOGY

## 2022-06-21 PROCEDURE — 59025 FETAL NON-STRESS TEST: CPT

## 2022-06-21 PROCEDURE — 250N000011 HC RX IP 250 OP 636: Performed by: OBSTETRICS & GYNECOLOGY

## 2022-06-21 RX ORDER — LIDOCAINE 40 MG/G
CREAM TOPICAL
Status: DISCONTINUED | OUTPATIENT
Start: 2022-06-21 | End: 2022-06-23 | Stop reason: HOSPADM

## 2022-06-21 RX ORDER — ONDANSETRON 2 MG/ML
4 INJECTION INTRAMUSCULAR; INTRAVENOUS EVERY 6 HOURS PRN
Status: DISCONTINUED | OUTPATIENT
Start: 2022-06-21 | End: 2022-06-23 | Stop reason: HOSPADM

## 2022-06-21 RX ORDER — ASPIRIN 81 MG/1
81 TABLET, CHEWABLE ORAL DAILY
Status: DISCONTINUED | OUTPATIENT
Start: 2022-06-22 | End: 2022-06-27 | Stop reason: HOSPADM

## 2022-06-21 RX ORDER — BETAMETHASONE SODIUM PHOSPHATE AND BETAMETHASONE ACETATE 3; 3 MG/ML; MG/ML
12 INJECTION, SUSPENSION INTRA-ARTICULAR; INTRALESIONAL; INTRAMUSCULAR; SOFT TISSUE EVERY 24 HOURS
Status: DISCONTINUED | OUTPATIENT
Start: 2022-06-21 | End: 2022-06-21

## 2022-06-21 RX ORDER — PROCHLORPERAZINE MALEATE 10 MG
10 TABLET ORAL EVERY 6 HOURS PRN
Status: DISCONTINUED | OUTPATIENT
Start: 2022-06-21 | End: 2022-06-23 | Stop reason: HOSPADM

## 2022-06-21 RX ORDER — ACETAMINOPHEN 325 MG/1
650 TABLET ORAL EVERY 4 HOURS PRN
Status: DISCONTINUED | OUTPATIENT
Start: 2022-06-21 | End: 2022-06-23 | Stop reason: HOSPADM

## 2022-06-21 RX ORDER — PROCHLORPERAZINE 25 MG
25 SUPPOSITORY, RECTAL RECTAL EVERY 12 HOURS PRN
Status: DISCONTINUED | OUTPATIENT
Start: 2022-06-21 | End: 2022-06-23 | Stop reason: HOSPADM

## 2022-06-21 RX ORDER — MAGNESIUM HYDROXIDE/ALUMINUM HYDROXICE/SIMETHICONE 120; 1200; 1200 MG/30ML; MG/30ML; MG/30ML
30 SUSPENSION ORAL
Status: DISCONTINUED | OUTPATIENT
Start: 2022-06-21 | End: 2022-06-23 | Stop reason: HOSPADM

## 2022-06-21 RX ORDER — METOCLOPRAMIDE 10 MG/1
10 TABLET ORAL EVERY 6 HOURS PRN
Status: DISCONTINUED | OUTPATIENT
Start: 2022-06-21 | End: 2022-06-23 | Stop reason: HOSPADM

## 2022-06-21 RX ORDER — ONDANSETRON 4 MG/1
4 TABLET, ORALLY DISINTEGRATING ORAL EVERY 6 HOURS PRN
Status: DISCONTINUED | OUTPATIENT
Start: 2022-06-21 | End: 2022-06-23 | Stop reason: HOSPADM

## 2022-06-21 RX ORDER — HYDROXYZINE HYDROCHLORIDE 50 MG/1
50 TABLET, FILM COATED ORAL EVERY 6 HOURS PRN
Status: DISCONTINUED | OUTPATIENT
Start: 2022-06-21 | End: 2022-06-23 | Stop reason: HOSPADM

## 2022-06-21 RX ORDER — BETAMETHASONE SODIUM PHOSPHATE AND BETAMETHASONE ACETATE 3; 3 MG/ML; MG/ML
12 INJECTION, SUSPENSION INTRA-ARTICULAR; INTRALESIONAL; INTRAMUSCULAR; SOFT TISSUE ONCE
Status: DISCONTINUED | OUTPATIENT
Start: 2022-06-21 | End: 2022-06-27 | Stop reason: HOSPADM

## 2022-06-21 RX ORDER — METOCLOPRAMIDE HYDROCHLORIDE 5 MG/ML
10 INJECTION INTRAMUSCULAR; INTRAVENOUS EVERY 6 HOURS PRN
Status: DISCONTINUED | OUTPATIENT
Start: 2022-06-21 | End: 2022-06-23 | Stop reason: HOSPADM

## 2022-06-21 RX ADMIN — BETAMETHASONE SODIUM PHOSPHATE AND BETAMETHASONE ACETATE 12 MG: 3; 3 INJECTION, SUSPENSION INTRA-ARTICULAR; INTRALESIONAL; INTRAMUSCULAR at 17:43

## 2022-06-21 ASSESSMENT — ACTIVITIES OF DAILY LIVING (ADL)
TOILETING_ISSUES: NO
DIFFICULTY_COMMUNICATING: NO
FALL_HISTORY_WITHIN_LAST_SIX_MONTHS: NO
CHANGE_IN_FUNCTIONAL_STATUS_SINCE_ONSET_OF_CURRENT_ILLNESS/INJURY: NO
DRESSING/BATHING_DIFFICULTY: NO
DIFFICULTY_EATING/SWALLOWING: NO
CONCENTRATING,_REMEMBERING_OR_MAKING_DECISIONS_DIFFICULTY: NO
DOING_ERRANDS_INDEPENDENTLY_DIFFICULTY: NO
WEAR_GLASSES_OR_BLIND: NO
WALKING_OR_CLIMBING_STAIRS_DIFFICULTY: NO
HEARING_DIFFICULTY_OR_DEAF: NO

## 2022-06-21 NOTE — H&P
[facility]    History and Physical  Obstetrics and Gynecology     Date of Admission:  2022    Assessment & Plan   Gil Chaudhari is a 32 year old  at 29w3d who presents for extended monitoring given 2 spontaneous decelerations on NST today for FGR.    PLAN:   Observation for extended fetal monitoring  MFM recommended rescue betamethasone course. We discussed risks/benefits of this.  NNP consult    Ghada Goldberg MD, FACOG, [facility]  2022 5:31 PM          History of Present Illness   Gil Chaudhari is a 32 year old female  at 29w3d is [facility] extended fetal monitoring for 2 spontaneous decelerations. She has known fetal growth restriction with monitoring by MFM. She has received betamethasone earlier this month.     PRENATAL COURSE  Prenatal course was complicated by fibroid, unicornuate uterus, fetal growth restriction, asthma (exercise induced)      Prenatal labs notable for: Rh positive.      Past Medical History    I have reviewed this patient's medical history and updated it with pertinent information if needed.   Past Medical History:   Diagnosis Date     Diabetes (H)     gestational diabetic- Diet control     Uncomplicated asthma        Past Surgical History   I have reviewed this patient's surgical history and updated it with pertinent information if needed.  History reviewed. No pertinent surgical history.    Prior to Admission Medications   Prior to Admission Medications   Prescriptions Last Dose Informant Patient Reported? Taking?   Docosahexaenoic Acid (DHA) 200 MG capsule 2022 at Unknown time  Yes Yes   Sig: Take 200 mg by mouth daily   Prenatal Vit-Fe Fumarate-FA (PRENATAL MULTIVITAMIN  PLUS IRON) 27-1 MG TABS 2022 at Unknown time  Yes Yes   Sig: Take by mouth daily   aspirin (ASA) 81 MG chewable tablet 2022 at Unknown time  Yes Yes   Sig: Take 81 mg by mouth daily   biotin 1000 MCG TABS tablet 2022 at Unknown time  Yes Yes    Sig: Take 1,000 mcg by mouth daily   calcium carbonate 600 mg-vitamin D 400 units (CALTRATE) 600-400 MG-UNIT per tablet Past Week at Unknown time  Yes Yes   Sig: Take 1 tablet by mouth 2 times daily      Facility-Administered Medications: None     Allergies   No Known Allergies    Social History   I have reviewed this patient's social history and updated it with pertinent information if needed. Gil Chaudhari  reports that she has never smoked. She has never used smokeless tobacco. She reports that she does not drink alcohol and does not use drugs.    Family History   I have reviewed this patient's family history and updated it with pertinent information if needed.   History reviewed. No pertinent family history.    Immunization History   TDAP  given this pregnancy.      Physical Exam                      Vital Signs with Ranges       Abdomen: gravid, single fetus, non-tender    Fetal Heart Tones: category 1    Constitutional: healthy, alert  Respiratory: No increased work of breathing, good air exchange.  Cardiovascular: RRR

## 2022-06-21 NOTE — NURSING NOTE
[facility] following patient for severe FGR. Per Dr. Voss patient would need extended monitoring d/t decelerations noted on NST at [facility].     This writer called [facility] and spoke with charge RN, Carmelita. Report given and charge accepting of patient. Dr. Patel and Dr. Rollins notified and spoke with Dr. Voss.     Yvon Harding RN on 6/21/2022 at 3:52 PM

## 2022-06-21 NOTE — PROGRESS NOTES
"Gil and her  arrived to the maternity center from [facility] for extended fetal monitoring after a fetal deceleration was noted in the clinic BPP was performed and nurse reported her to have \"passed\"   Escorted to room 21, changed and monitors applied, semi fouler position, VSS and admission completed.  Orders given to the charge RN by Dr Goldberg for betamethasone- Patient and her spouse have questions about repeating the betamethasone that she received 2 weeks ago and would like to speak with the Dr first, Dr Goldberg notified and a consult for NNP was placed.  530 pm the Teri Zayas NNP is at the bedside.  "

## 2022-06-21 NOTE — NURSING NOTE
NST Performed due to severe FGR.  Dr. Voss reviewed efm tracing. See NST/BPP Doc Flowsheet tab.    Patient sent to [facility] L/D for overnight monitoring due to prolonged decleration and likely a rescue dose of steroids due to prematurity risk. Patient and  chose to drive over to main entrance vs walking over with RN. Patient again stressed the importance of further monitoring and patient and partner agreed and are headed there now at 1615.

## 2022-06-22 LAB
ABO/RH(D): NORMAL
ANTIBODY SCREEN: NEGATIVE
ERYTHROCYTE [DISTWIDTH] IN BLOOD BY AUTOMATED COUNT: 14.3 % (ref 10–15)
GLUCOSE BLDC GLUCOMTR-MCNC: 119 MG/DL (ref 70–99)
GLUCOSE BLDC GLUCOMTR-MCNC: 124 MG/DL (ref 70–99)
HCT VFR BLD AUTO: 32.9 % (ref 35–47)
HGB BLD-MCNC: 11.1 G/DL (ref 11.7–15.7)
HOLD SPECIMEN: NORMAL
HOLD SPECIMEN: NORMAL
MCH RBC QN AUTO: 32.9 PG (ref 26.5–33)
MCHC RBC AUTO-ENTMCNC: 33.7 G/DL (ref 31.5–36.5)
MCV RBC AUTO: 98 FL (ref 78–100)
PLATELET # BLD AUTO: 154 10E3/UL (ref 150–450)
RBC # BLD AUTO: 3.37 10E6/UL (ref 3.8–5.2)
SPECIMEN EXPIRATION DATE: NORMAL
WBC # BLD AUTO: 7 10E3/UL (ref 4–11)

## 2022-06-22 PROCEDURE — 86901 BLOOD TYPING SEROLOGIC RH(D): CPT | Performed by: OBSTETRICS & GYNECOLOGY

## 2022-06-22 PROCEDURE — 86850 RBC ANTIBODY SCREEN: CPT | Performed by: OBSTETRICS & GYNECOLOGY

## 2022-06-22 PROCEDURE — 82962 GLUCOSE BLOOD TEST: CPT

## 2022-06-22 PROCEDURE — 36415 COLL VENOUS BLD VENIPUNCTURE: CPT | Performed by: OBSTETRICS & GYNECOLOGY

## 2022-06-22 PROCEDURE — 250N000013 HC RX MED GY IP 250 OP 250 PS 637: Performed by: OBSTETRICS & GYNECOLOGY

## 2022-06-22 PROCEDURE — 85027 COMPLETE CBC AUTOMATED: CPT | Performed by: OBSTETRICS & GYNECOLOGY

## 2022-06-22 PROCEDURE — 258N000003 HC RX IP 258 OP 636: Performed by: OBSTETRICS & GYNECOLOGY

## 2022-06-22 RX ADMIN — SODIUM CHLORIDE, POTASSIUM CHLORIDE, SODIUM LACTATE AND CALCIUM CHLORIDE 1000 ML: 600; 310; 30; 20 INJECTION, SOLUTION INTRAVENOUS at 04:10

## 2022-06-22 RX ADMIN — ASPIRIN 81 MG CHEWABLE TABLET 81 MG: 81 TABLET CHEWABLE at 10:28

## 2022-06-22 NOTE — UTILIZATION REVIEW
Admission Status; Secondary Review Determination       Under the authority of the Utilization Management Committee, the utilization review process indicated a secondary review on the above patient. The review outcome is based on review of the medical records, discussions with staff, and applying clinical experience noted on the date of the review.     (x) Outpatient Status with extended recovery is appropriate - This patient does not meet hospital inpatient criteria. If this patient's primary payer is Medicare and was admitted as an inpatient, Condition Code 44 should be used and patient status changed to outpatient recovery.    RATIONALE FOR DETERMINATION     Ms. Chaudhari is a 33 yo female pt who presents with a IUP at 29+4 wks from clinic after multiple decelerations on NST.  Received first betamethasone dose and scheduled for second this even.  If tracing after this dose given is reassuring, then will be discharge home.  If tracing is not reassuring, then would recommend transition to INPATIENT status as clinical plan outlined is for for IV magnesium bolus and infusion until delivery.     The severity of illness, intensity of service provided, expected LOS and risk for adverse outcome doesn't meet inpatient hospital admission.     The information on this document is developed by the utilization review team in order for the business office to ensure compliance. This only denotes the appropriateness of proper admission status and does not reflect the quality of care rendered.   The definitions of Inpatient Status and Observation Status used in making the determination above are those provided in the CMS Coverage Manual, Chapter 1 and Chapter 6, section 70.4.       Sincerely,       Milla López, DO  Utilization Review  Physician Advisor  [facility].

## 2022-06-22 NOTE — PROGRESS NOTES
Progress note     PROGRESS NOTE    ASSESSMENT: PLAN:   IUP at 29+4 weeks  Multiple decelerations on NST  Gestational diabetes  Has received betamethasone course 3 weeks ago. - received rescue dose on this hospitalization.     Per MFM  Recommendations  -Continue inpatient management through rescue steroid window (would recommend standard course of rescue BMZ - 2 doses 24 hours apart)  -Re-evaluate tracing when rescue course is complete to determine if patient is stable for discharge  -If nonreassuring fetal tracing necessitating delivery would recommend Mag 6g bolus followed by 2g/hr for fetal neuprotection (discontinue after delivery of the fetus)  -MFM available as needed for additional recommendations    Crystal Frazier MD  [facility]  [phone]

## 2022-06-22 NOTE — PROGRESS NOTES
MFM Brief Progress Note    Ms. Chaudhari is a 33 yo  at 29w4d with severe fetal growth restriction (EFW < 1st%) with normal umbilical artery dopplers sent to L&D for prolonged monitoring after having multiple spontaneous decelerations on her NST in clinic.     Upon admission she continued to have intermittent decelerations. She was given a rescue dose of BMZ at 1740 and met with the NICU.     Overnight tracing reviewed - overall category 1, baseline 140s with moderate variability and accelerations but has continued to have intermittent decels. Last significant deceleration was at 6:30 to the 60s for 3 minutes.     Recommendations  -Continue inpatient management through rescue steroid window (would recommend standard course of rescue BMZ - 2 doses 24 hours apart)  -Re-evaluate tracing when rescue course is complete to determine if patient is stable for discharge  -If nonreassuring fetal tracing necessitating delivery would recommend Mag 6g bolus followed by 2g/hr for fetal neuprotection (discontinue after delivery of the fetus)  -MFM available as needed for additional recommendations    These recommendations were communicated with the patient's primary provider Dr. Cotton who is in agreement with the plan.    Thank you for involving us in the care of your patient, do not hesitate to call us with additional questions/concerns.      Jaylene Badillo MD  , OB/GYN  Maternal-Fetal Medicine  [phone] (Pager)

## 2022-06-22 NOTE — PLAN OF CARE
Problem: Plan of Care - These are the overarching goals to be used throughout the patient stay.    Goal: Optimal Comfort and Wellbeing  Outcome: Ongoing, Progressing   Patient has been able to sleep this shift, though was often awakened for adjustments of fetal monitor. All cares explained.

## 2022-06-22 NOTE — PLAN OF CARE
Problem: Plan of Care - These are the overarching goals to be used throughout the patient stay.    Goal: Plan of Care Review/Shift Note  Description: The Plan of Care Review/Shift note should be completed every shift.  The Outcome Evaluation is a brief statement about your assessment that the patient is improving, declining, or no change.  This information will be displayed automatically on your shift note.  Outcome: Ongoing, Progressing  Flowsheets (Taken 6/22/2022 1833)  Plan of Care Reviewed With: patient  Overall Patient Progress: improving     Problem: Plan of Care - These are the overarching goals to be used throughout the patient stay.    Goal: Optimal Comfort and Wellbeing  Outcome: Ongoing, Progressing   Patient has been resting in bed today. Baby has been active. Three prolonged FHR decelerations noted on EFM strip since 0730 this morning. Occasional variable decelerations. FHR baseline 145bpm, moderate variability and accelerations noted. Patient denies any contractions or discomfort.

## 2022-06-22 NOTE — PROGRESS NOTES
Dr. Goldberg updated at 0340 and 0355 about prolonged decels observed. Order received to give IVF bolus. Lab work drawn. Patient updated.

## 2022-06-22 NOTE — CONSULTS
Procedure Note   NNP Maternal Consult:  Date of Admission:  2022     Assessment & Plan--Per OB     Gil Chaudhari is a 32 year old  at 29w3d who presents for extended monitoring given 2 spontaneous decelerations on NST today for FGR.     PLAN:   Observation for extended fetal monitoring  MFM recommended rescue betamethasone course.     NNP consult: Requested by Ghada Goldberg MD   NNP asked to speak with parents concerning current assessment and risks/benefiits re-rescue dose of Betamethasone; and concern for  delivery.    NNP explained:    Greater benefit to fetus vs risk re receiving betamethasone for maturation of lung status    Infants who do not need intubation/ventilation do better overall; decreased illness, decreased hospital stay    RESP:Although we cannot guarantee that respiratory distress syndrome would not be a factor; it would be less severe with added steroid doses    Parents are in agreement to plan  NNP updated L&D RN     Dina Zayas CNP; ARNP~BC  Division of Neonatology   Nurse Practitioner  [facility]

## 2022-06-23 DIAGNOSIS — O36.5990 PREGNANCY AFFECTED BY FETAL GROWTH RESTRICTION: Primary | ICD-10-CM

## 2022-06-23 LAB — GP B STREP DNA SPEC QL NAA+PROBE: NEGATIVE

## 2022-06-23 PROCEDURE — 250N000013 HC RX MED GY IP 250 OP 250 PS 637: Performed by: OBSTETRICS & GYNECOLOGY

## 2022-06-23 PROCEDURE — 120N000001 HC R&B MED SURG/OB

## 2022-06-23 RX ORDER — DIPHENHYDRAMINE HCL 25 MG
25 CAPSULE ORAL EVERY 6 HOURS PRN
Status: DISCONTINUED | OUTPATIENT
Start: 2022-06-23 | End: 2022-06-27 | Stop reason: HOSPADM

## 2022-06-23 RX ORDER — PROCHLORPERAZINE MALEATE 10 MG
10 TABLET ORAL EVERY 6 HOURS PRN
Status: DISCONTINUED | OUTPATIENT
Start: 2022-06-23 | End: 2022-06-27 | Stop reason: HOSPADM

## 2022-06-23 RX ORDER — METOCLOPRAMIDE 10 MG/1
10 TABLET ORAL EVERY 6 HOURS PRN
Status: DISCONTINUED | OUTPATIENT
Start: 2022-06-23 | End: 2022-06-27 | Stop reason: HOSPADM

## 2022-06-23 RX ORDER — ONDANSETRON 2 MG/ML
4 INJECTION INTRAMUSCULAR; INTRAVENOUS EVERY 6 HOURS PRN
Status: DISCONTINUED | OUTPATIENT
Start: 2022-06-23 | End: 2022-06-27 | Stop reason: HOSPADM

## 2022-06-23 RX ORDER — PRENATAL VIT/IRON FUM/FOLIC AC 27MG-0.8MG
1 TABLET ORAL DAILY
Status: DISCONTINUED | OUTPATIENT
Start: 2022-06-23 | End: 2022-06-27 | Stop reason: HOSPADM

## 2022-06-23 RX ORDER — METOCLOPRAMIDE HYDROCHLORIDE 5 MG/ML
10 INJECTION INTRAMUSCULAR; INTRAVENOUS EVERY 6 HOURS PRN
Status: DISCONTINUED | OUTPATIENT
Start: 2022-06-23 | End: 2022-06-27 | Stop reason: HOSPADM

## 2022-06-23 RX ORDER — ACETAMINOPHEN 325 MG/1
650 TABLET ORAL EVERY 4 HOURS PRN
Status: DISCONTINUED | OUTPATIENT
Start: 2022-06-23 | End: 2022-06-27 | Stop reason: HOSPADM

## 2022-06-23 RX ORDER — DIPHENHYDRAMINE HYDROCHLORIDE 50 MG/ML
25 INJECTION INTRAMUSCULAR; INTRAVENOUS EVERY 6 HOURS PRN
Status: DISCONTINUED | OUTPATIENT
Start: 2022-06-23 | End: 2022-06-27 | Stop reason: HOSPADM

## 2022-06-23 RX ORDER — ONDANSETRON 4 MG/1
4 TABLET, ORALLY DISINTEGRATING ORAL EVERY 6 HOURS PRN
Status: DISCONTINUED | OUTPATIENT
Start: 2022-06-23 | End: 2022-06-27 | Stop reason: HOSPADM

## 2022-06-23 RX ORDER — PROCHLORPERAZINE 25 MG
25 SUPPOSITORY, RECTAL RECTAL EVERY 12 HOURS PRN
Status: DISCONTINUED | OUTPATIENT
Start: 2022-06-23 | End: 2022-06-27 | Stop reason: HOSPADM

## 2022-06-23 RX ADMIN — PRENATAL VIT W/ FE FUMARATE-FA TAB 27-0.8 MG 1 TABLET: 27-0.8 TAB at 14:14

## 2022-06-23 RX ADMIN — ASPIRIN 81 MG CHEWABLE TABLET 81 MG: 81 TABLET CHEWABLE at 10:47

## 2022-06-23 ASSESSMENT — ACTIVITIES OF DAILY LIVING (ADL)
ADLS_ACUITY_SCORE: 18

## 2022-06-23 NOTE — PLAN OF CARE
Problem: Plan of Care - These are the overarching goals to be used throughout the patient stay.    Goal: Plan of Care Review/Shift Note  Description: The Plan of Care Review/Shift note should be completed every shift.  The Outcome Evaluation is a brief statement about your assessment that the patient is improving, declining, or no change.  This information will be displayed automatically on your shift note.  Outcome: Ongoing, Progressing  Flowsheets (Taken 6/23/2022 0326)  Plan of Care Reviewed With:   patient   spouse  Goal: Optimal Comfort and Wellbeing  Outcome: Ongoing, Progressing     Problem: Maternal-Fetal Wellbeing  Goal: Optimal Maternal-Fetal Wellbeing  Outcome: Ongoing, Progressing

## 2022-06-23 NOTE — PROGRESS NOTES
Spoke with MFM - plan for inpatient management due to severe growth restriction causing increased risk of still birth.  Continuous fetal monitoring.  Delivery if non-reassuring fetal status.  MFM to perform BPP 6/24/22.    Mona Low DO, FACOOG, SERGOOG  [facility]

## 2022-06-23 NOTE — PROGRESS NOTES
OB ANTEPARTUM PROGRESS NOTE    IUP at 29w5d, admitted for fetal growth restriction     SUBJECTIVE:  Patient feels well. She has no new complaints. Patient states the baby is active, and is completing fetal kick counts. States that she is experiencing no contractions.  Patient denies headache, change in vision or upper abdominal pain. She denies vaginal bleeding. denies leaking of fluids. denies change in discharge.     She has many questions about the length of her stay, getting outside, not begin able to move around the room.     OBJECTIVE:  /77 (BP Location: Right arm, Patient Position: Semi-Arriaga's)   Pulse 87   Temp 98.3  F (36.8  C) (Oral)   Resp 16   LMP 11/22/2021   SpO2 98%    Abd: gravid  NST: 150bpm, minimal to moderate variablity, occasional accels, no decels the PM  TOCO: no contractions    LABS:  No new labs    LABS: GBS negative    MEDICATIONS:    Current Facility-Administered Medications:      acetaminophen (TYLENOL) tablet 650 mg, 650 mg, Oral, Q4H PRN, Mona Low, DO     aspirin (ASA) chewable tablet 81 mg, 81 mg, Oral, Daily, Navya Goldberg MD, 81 mg at 06/23/22 1047     betamethasone acet & sod phos (CELESTONE) injection 12 mg, 12 mg, Intramuscular, Once, Navya Goldberg MD     diphenhydrAMINE (BENADRYL) capsule 25 mg, 25 mg, Oral, Q6H PRN **OR** diphenhydrAMINE (BENADRYL) injection 25 mg, 25 mg, Intravenous, Q6H PRN, Mona Low DO     metoclopramide (REGLAN) injection 10 mg, 10 mg, Intravenous, Q6H PRN **OR** metoclopramide (REGLAN) tablet 10 mg, 10 mg, Oral, Q6H PRN, Mona Low, DO     No Tdap Needed - Assessment: Patient does not need Tdap vaccine, , Does not apply, Continuous PRN, Mona Low, DO     ondansetron (ZOFRAN ODT) ODT tab 4 mg, 4 mg, Oral, Q6H PRN **OR** ondansetron (ZOFRAN) injection 4 mg, 4 mg, Intravenous, Q6H PRN, Mona Low, DO     prenatal multivitamin w/iron per tablet 1 tablet, 1 tablet, Oral,  Daily, Mona Low,      prochlorperazine (COMPAZINE) injection 10 mg, 10 mg, Intravenous, Q6H PRN **OR** prochlorperazine (COMPAZINE) tablet 10 mg, 10 mg, Oral, Q6H PRN **OR** prochlorperazine (COMPAZINE) suppository 25 mg, 25 mg, Rectal, Q12H PRN, Mona Low DO    ASSESSMENT:  32 year old  at 29w5d   fetal growth restriction   gestational diabetes    PLAN:  - continue admission with continuous fetal monitoring  - okay to be off the monitor for showers  - okay to use vito to move around the unit, await antepartum testing tomorrow to guide admission and consider outside time during the day      Mona Low  [facility]   OFFICE: [phone]

## 2022-06-23 NOTE — OP NOTE
Procedure: repeat low transverse  section     Date of Service: 22     I was the primary surgical assistant for the duration of this operation.  I performed assistance with retraction which was required for exposure and dissection during the procedure, and all other duties as needed. This surgery was complicated due to obesity and three prior  sections.     I was present through the entire procedure; total time spent was approximately 60 minutes.      Mona Low DO  [facility]  Office: [phone]

## 2022-06-23 NOTE — PROGRESS NOTES
1910 - Assumed care of 31 yo, , arrived to [facility] on 22 from [facility] for a prolonged deceleration on routine US.  Seeing [facility] based on exteme IUGR of < 1%.  At this time [place] is pleasant, and open to all cares provided.  Pregnancy remarkable for previous decelerations noted at 26 WGA followed by BMZ X 2 doses, recent diagnosis of GDM, resolution of subchorionic hemmorahage at 8 WGA, a unicornate uterus and as previously noted, extreme IUGR < 1%.  EMX2 currently applied. 's with mod variability and age appropriate accelerations.  Uterus soft and non tender. VSS. Discussed POC to include continued FHR monitoring, application of bilateral sequentials at night, MFM consult in am and discussion with diabetic education if determined pt will be admitted.  IV saline locked in left wrist, painful with flush, will re-assess. FOB at bedside.  All questions and concerns addressed at this time.

## 2022-06-23 NOTE — PROGRESS NOTES
OB NOTE    Utilization review note noted  Continues to have intermittent decels  Continue to monitor  Will ask for MFM opinion again in am to determine plan    Jose Patel MD

## 2022-06-24 ENCOUNTER — ANCILLARY PROCEDURE (OUTPATIENT)
Dept: ULTRASOUND IMAGING | Facility: HOSPITAL | Age: 32
DRG: 833 | End: 2022-06-24
Attending: OBSTETRICS & GYNECOLOGY
Payer: COMMERCIAL

## 2022-06-24 ENCOUNTER — APPOINTMENT (OUTPATIENT)
Dept: MATERNAL FETAL MEDICINE | Facility: HOSPITAL | Age: 32
DRG: 833 | End: 2022-06-24
Attending: OBSTETRICS & GYNECOLOGY
Payer: COMMERCIAL

## 2022-06-24 DIAGNOSIS — O36.5990 PREGNANCY AFFECTED BY FETAL GROWTH RESTRICTION: ICD-10-CM

## 2022-06-24 PROCEDURE — 250N000013 HC RX MED GY IP 250 OP 250 PS 637: Performed by: OBSTETRICS & GYNECOLOGY

## 2022-06-24 PROCEDURE — 76816 OB US FOLLOW-UP PER FETUS: CPT

## 2022-06-24 PROCEDURE — 76819 FETAL BIOPHYS PROFIL W/O NST: CPT | Mod: 26 | Performed by: OBSTETRICS & GYNECOLOGY

## 2022-06-24 PROCEDURE — 120N000001 HC R&B MED SURG/OB

## 2022-06-24 PROCEDURE — 76816 OB US FOLLOW-UP PER FETUS: CPT | Mod: 26 | Performed by: OBSTETRICS & GYNECOLOGY

## 2022-06-24 PROCEDURE — 76819 FETAL BIOPHYS PROFIL W/O NST: CPT

## 2022-06-24 PROCEDURE — 258N000003 HC RX IP 258 OP 636: Performed by: OBSTETRICS & GYNECOLOGY

## 2022-06-24 PROCEDURE — 76820 UMBILICAL ARTERY ECHO: CPT | Mod: 26 | Performed by: OBSTETRICS & GYNECOLOGY

## 2022-06-24 RX ADMIN — SODIUM CHLORIDE, POTASSIUM CHLORIDE, SODIUM LACTATE AND CALCIUM CHLORIDE 500 ML: 600; 310; 30; 20 INJECTION, SOLUTION INTRAVENOUS at 03:40

## 2022-06-24 RX ADMIN — ASPIRIN 81 MG CHEWABLE TABLET 81 MG: 81 TABLET CHEWABLE at 08:39

## 2022-06-24 RX ADMIN — PRENATAL VIT W/ FE FUMARATE-FA TAB 27-0.8 MG 1 TABLET: 27-0.8 TAB at 08:39

## 2022-06-24 ASSESSMENT — ACTIVITIES OF DAILY LIVING (ADL)
ADLS_ACUITY_SCORE: 18

## 2022-06-24 NOTE — PLAN OF CARE
Fetal tracing category one for my shift.  The tracing has not shown a variable decel since 07 am to 0230 pm. Pt c/o feeling normal fetal movement. Pt had another ultrasound from [facility] today. Dr Fang had read the results and followed up with [facility]. The patient was recommended to continue to stay in hospital for continuous fetal monitoring.

## 2022-06-24 NOTE — PROGRESS NOTES
Notified Dr. Low on Category 2 Tracing, along with difficulty tracing FHT's. Discussed interventions already implemented with repositioning and IV fluid bolus. Plan to continue to watch and will notify Dr. Low with any changes.

## 2022-06-24 NOTE — PROGRESS NOTES
Please see full imaging report from [facility] program under imaging tab.    I was asked to provide recommendations based on today's US and review of her fetal heart rate tracing.   No consult was requested so the patient was not seen by me.     Ms. Chaudhari is currently hospitalized for severe fetal growth restriction in the context of intermittent fetal decelerations, not frequent enough for delivery at this time.   Today she has improved fetal growth at the 4%, no longer severe FGR (now just FGR).  Her umbilical Doppler flow is normal and BPP is reassuring at 8/8.     I reviewed her monitoring over the last 12 hours (FHR monitoring). Since ~0330 it has been reactive and reassuring with 10x10 accelerations, and moderate variability. However, around 0300 she had two variable decelerations lasting 30 seconds with leo of 85 bpm and 95 bpm.     While it is reassuring that her growth has improved, I do not recommend discharge home (with close outpatient surveillance between her OB/gyn team and with weekly surveillance with MFM) until she has had no evidence of recurrent decelerations for 24 hours. If she does develop recurrent decelerations that are persistent (3 or more within a 60 minute time frame would be a cutoff to use) and do not resolve, then delivery will instead be recommended.     This information will be conveyed to her primary OB team.     If she remains hospitalized, we will continue to see her once or twice weekly, more frequently if other abnormalities or concerns arise or persist.   If she is discharged, she needs to be scheduled for weekly surveillance outpatient with MFM and fetal growth assessment every 3 weeks.   Continue OB care with Ophelia and Joe. As they have not been managing her here at [place], I recommend that her primary inpatient OB team call them to establish care for follow up OB care as well.     Vandana Jones MD  Maternal Fetal Medicine

## 2022-06-24 NOTE — PROGRESS NOTES
[facility] Labor and Delivery Progress Note    Gil Chaudhari MRN# [id number]   Age: 32 year old YOB: 1990           Subjective:   Patient is ambulating and endorsing feeling good movement, hopeful for discharge.           Objective:   Patient Vitals for the past 24 hrs:   BP Temp Temp src Pulse Resp Oximeter Heart Rate   22 0736 120/82 97.8  F (36.6  C) Oral -- 16 70 bpm   22 2331 116/67 98.2  F (36.8  C) Oral 84 16 84 bpm   22 1926 107/66 98  F (36.7  C) Oral -- 16 86 bpm   22 1415 119/73 -- -- 80 18 80 bpm   22 1414 -- 98.2  F (36.8  C) Oral -- -- --   22 0815 122/77 98.3  F (36.8  C) Oral -- 16 82 bpm   Membranes: Intact     Fetal Heart Rate:    Monitor: external US    Variability: minimal (detectable, amplitude less than or equal to 5 bpm)    Baseline Rate: normal range , rare episodic late FHR decelerations             Assessment:   Gil Chaudhari is a 32 year old  who is 29w6d here with admitted with known fetal growth restriction and recommendation for continuous external fetal monitoring given spontaneous late FHR decelerations noted at MFM appointment.          Plan:   Repeat BPP today, await [facility] recommendations for discharge.      Nely Fang MD

## 2022-06-24 NOTE — PLAN OF CARE
Problem: Plan of Care - These are the overarching goals to be used throughout the patient stay.    Goal: Plan of Care Review/Shift Note  Description: The Plan of Care Review/Shift note should be completed every shift.  The Outcome Evaluation is a brief statement about your assessment that the patient is improving, declining, or no change.  This information will be displayed automatically on your shift note.  Outcome: Ongoing, Progressing    Patient has been resting well tonight, and intermittently waking for fetal monitor adjustments. She states she is not having any contractions, and the baby has been very active tonight.

## 2022-06-24 NOTE — PROGRESS NOTES
RN in to see patient at shift change, she is calm and happy appearing. Denies contractions, denies pain, is resting in the bed comfortably. She does request to get up a bit before she goes to sleep. RN agrees to have her up to get ready for bed and use the bathroom and do night time cares upon her request.     EFM unplugged from monitor at 0038 to start night time cares. Will place back on monitor as soon as patient is done getting ready for bed.

## 2022-06-25 PROCEDURE — 250N000013 HC RX MED GY IP 250 OP 250 PS 637: Performed by: OBSTETRICS & GYNECOLOGY

## 2022-06-25 PROCEDURE — 120N000001 HC R&B MED SURG/OB

## 2022-06-25 RX ADMIN — ASPIRIN 81 MG CHEWABLE TABLET 81 MG: 81 TABLET CHEWABLE at 08:33

## 2022-06-25 RX ADMIN — PRENATAL VIT W/ FE FUMARATE-FA TAB 27-0.8 MG 1 TABLET: 27-0.8 TAB at 08:33

## 2022-06-25 ASSESSMENT — ACTIVITIES OF DAILY LIVING (ADL)
ADLS_ACUITY_SCORE: 18

## 2022-06-25 NOTE — PROGRESS NOTES
OB ANTEPARTUM PROGRESS NOTE     IUP at 30w0d, admitted for fetal growth restriction     SSESSMENT:  32 year old  at 30w0d   fetal growth restriction   gestational diabetes     PLAN: appreciated MFM recommendations- she is to be free from fetal decelerations for 24 hours to be recommended for dishcarge'    - continue admission with continuous fetal monitoring  - okay to be off the monitor for showers  - okay to use vito to move around the unit, await antepartum testing tomorrow to guide admission and consider outside time during the day       SUBJECTIVE:  Patient feels well. She has no new complaints. Patient states the baby is active, and is completing fetal kick counts. States that she is experiencing no contractions.  Patient denies headache, change in vision or upper abdominal pain. She denies vaginal bleeding. denies leaking of fluids. denies change in discharge.      She has many questions about the length of her stay, getting outside, not begin able to move around the room.      OBJECTIVE:  /82   Pulse 84   Temp 98.1  F (36.7  C) (Oral)   Resp 16   LMP 2021   SpO2 98%        LABS:  No new labs     LABS: GBS negative     MEDICATIONS:  S/p betamethasone with rescue dose 22      Crystal Frazier MD  [facility]  [phone]

## 2022-06-25 NOTE — PROGRESS NOTES
FHT tracing not complete prior to possible prolong deceleration at 2244, moderate variability throughout. Will continue to monitor

## 2022-06-25 NOTE — PROGRESS NOTES
"Reviewed chart notes and patient concerns, patient and  request to review recommendations with OB this evening. RN and OB at bedside with patient and  to review concerns and recommendations. Discussed different concerns regarding decelerations and risk/benefits of prolonged monitoring. \"Rescue\" betamethasone course was originally recommended by M to be two doses, patient and  did research and would like to wait on the fourth dose at this time. Also discussed GDM, patient had not seen endocrinologist prior to this admission. Due to recent steroids, plan to evaluate blood sugars after the weekend and decide on blood sugar management plan at that time.     Plan to evaluate continued hospital monitoring verus frequent surveillance tomorrow after monitoring through the night. At this time, there has been one variable deceleration today at 1820.    "

## 2022-06-25 NOTE — PROGRESS NOTES
Pt has slept through night. Occ ctx that pt does not feel. Occasional decel, but followed by period of fetal activity (palpable upon pt assessment), moderate variability, and 10 x 10 accels. Observing continuously.

## 2022-06-25 NOTE — PLAN OF CARE
Fetal tracing has been category one from 7814-3923, no deceleration shown. Pt is not timothy. She is feeling her fetus active.

## 2022-06-25 NOTE — PROGRESS NOTES
Reviewed FHR tracing with Dr Fang, reviewed occ variable decel, late decel, but periods of good variability and 10 x 10 accel. No new orders. Of note, I considered restarting IVFluids after decels, but pt declined and FHT returned to reassuring soon after event. IV S.L. flushed. Report to Meron Vitale RN

## 2022-06-26 PROCEDURE — 120N000001 HC R&B MED SURG/OB

## 2022-06-26 PROCEDURE — 250N000013 HC RX MED GY IP 250 OP 250 PS 637: Performed by: OBSTETRICS & GYNECOLOGY

## 2022-06-26 RX ADMIN — ASPIRIN 81 MG CHEWABLE TABLET 81 MG: 81 TABLET CHEWABLE at 09:44

## 2022-06-26 RX ADMIN — PRENATAL VIT W/ FE FUMARATE-FA TAB 27-0.8 MG 1 TABLET: 27-0.8 TAB at 09:44

## 2022-06-26 ASSESSMENT — ACTIVITIES OF DAILY LIVING (ADL)
ADLS_ACUITY_SCORE: 18

## 2022-06-26 NOTE — PROGRESS NOTES
Shift report given to writer ELISEO Hui. Writer at bedside to meet pt. Per previous RN Gil refused having her TOCO on for part of the night so she could sleep better. Pt still wanting to sleep and denies any LOF or contractions. Uterus palpates soft. Will continue to monitor and update IHOB as needed.

## 2022-06-26 NOTE — PROGRESS NOTES
Gil interesting in showering today. Maria Luisa LUCERO reviewed FHR tracing and is okay with pt showering. See new orders.

## 2022-06-26 NOTE — PLAN OF CARE
Problem: Plan of Care - These are the overarching goals to be used throughout the patient stay.    Goal: Plan of Care Review/Shift Note  Description: The Plan of Care Review/Shift note should be completed every shift.  The Outcome Evaluation is a brief statement about your assessment that the patient is improving, declining, or no change.  This information will be displayed automatically on your shift note.  Outcome: Ongoing, Progressing  Flowsheets (Taken 6/25/2022 2205)  Plan of Care Reviewed With: patient  Outcome Evaluation: Continue antepartum pregnancy status as long as able and stable. Denies contractions.  Continuous monitoring except when up to bathroom.  Plan of care reviewed with patient.  Overall Patient Progress: improving     Problem: Maternal-Fetal Wellbeing  Goal: Optimal Maternal-Fetal Wellbeing  Outcome: Ongoing, Progressing    Plan of care reviewed.  Patient to call with any changes.  Denies contractions.  Continuous monitoring.  Monitor adjusted as needed, fetal movement palpated.  Patient in good spirits.

## 2022-06-26 NOTE — PROGRESS NOTES
OB ANTEPARTUM PROGRESS NOTE    [id number] at 30w1d, admitted since 6/21 for observation for fetal decelerations in the setting of FGR 4%ile    SUBJECTIVE:  Gil feels well today.  She and her  have many questions about the length of stay and necessity of her admission.  She is also concerned about risks of being sedentary. She reports +FM. No LOF or ctx.     OBJECTIVE:  /87   Pulse 85   Temp 98.1  F (36.7  C)   Resp 16   LMP 11/22/2021   SpO2 98%    Gen: Appears well  Abd: gravid    FHT reviewed from overnight.   9251-5716: variable deceleration  1835-3209: variable deceleration   7159-3635: Late appearing deceleration  4658-9603: lat appearing deceleration  0558-1146: variable deceleration    All decelerations are followed by moderate variability and with some 10x10 accelerations.     Results for orders placed or performed during the hospital encounter of 06/21/22   Asymptomatic COVID-19 Virus (Coronavirus) by PCR Nasopharyngeal     Status: Normal    Specimen: Nasopharyngeal; Swab   Result Value Ref Range    SARS CoV2 PCR Negative Negative    Narrative    Testing was performed using the tank  SARS-CoV-2 & Influenza A/B Assay on the tank  [employer]  System.  This test should be ordered for the detection of SARS-COV-2 in individuals who meet SARS-CoV-2 clinical and/or epidemiological criteria. Test performance is unknown in asymptomatic patients.  This test is for in vitro diagnostic use under the FDA EUA for laboratories certified under CLIA to perform moderate and/or high complexity testing. This test has not been FDA cleared or approved.  A negative test does not rule out the presence of PCR inhibitors in the specimen or target RNA in concentration below the limit of detection for the assay. The possibility of a false negative should be considered if the patient's recent exposure or clinical presentation suggests COVID-19.  [facility] are certified under the Clinical Laboratory  Improvement Amendments of 1988 (CLIA-88) as qualified to perform moderate and/or high complexity laboratory testing.   Glucose by meter     Status: Abnormal   Result Value Ref Range    GLUCOSE BY METER POCT 119 (H) 70 - 99 mg/dL   CBC with platelets     Status: Abnormal   Result Value Ref Range    WBC Count 7.0 4.0 - 11.0 10e3/uL    RBC Count 3.37 (L) 3.80 - 5.20 10e6/uL    Hemoglobin 11.1 (L) 11.7 - 15.7 g/dL    Hematocrit 32.9 (L) 35.0 - 47.0 %    MCV 98 78 - 100 fL    MCH 32.9 26.5 - 33.0 pg    MCHC 33.7 31.5 - 36.5 g/dL    RDW 14.3 10.0 - 15.0 %    Platelet Count 154 150 - 450 10e3/uL   Extra Tube     Status: None    Narrative    The following orders were created for panel order Extra Tube.  Procedure                               Abnormality         Status                     ---------                               -----------         ------                     Extra Blue Top Tube[[id number]]                              Final result               Extra Red Top Tube[[id number]]                               Final result                 Please view results for these tests on the individual orders.   Extra Blue Top Tube     Status: None   Result Value Ref Range    Hold Specimen JIC    Extra Red Top Tube     Status: None   Result Value Ref Range    Hold Specimen JIC    Glucose by meter     Status: Abnormal   Result Value Ref Range    GLUCOSE BY METER POCT 124 (H) 70 - 99 mg/dL   Adult Type and Screen     Status: None   Result Value Ref Range    ABO/RH(D) B POS     Antibody Screen Negative Negative    SPECIMEN EXPIRATION DATE [id number]    Group B strep PCR     Status: Normal    Specimen: Rectovaginal; Swab   Result Value Ref Range    Group B Strep PCR Negative Negative    Narrative    The EGEN Xpert GBS LB Assay, performed on the [employer], is a qualitative in vitro diagnostic test designed to detect Group B Streptococcus (GBS) DNA from enriched vaginal/rectal swab specimens, using fully  automated, real-time polymerase chain reaction (PCR) with fluorogenic detection of the amplified DNA. Xpert GBS LB Assay testing is indicated as an aid in determining GBS colonization status in antepartum women. This assay does not diagnose or monitor treatment for GBS infections. The [employer] Xpert GBS LB Assay is intended for use in hospital, reference or state laboratory settings. The device is not intended for point-of-care use.   Rubella Antibody IgG (External Result)     Status: None   Result Value Ref Range    Rubella Antibody IgG (External) Non-Immune Nonreactive   Hepatitis B Surface Antigen (External Result)     Status: None   Result Value Ref Range    Hepatitis B Surface Antigen (External) Negative Nonreactive   VDRL (Syphilis) (OB External Result)     Status: None   Result Value Ref Range    VDRL (Syphilis) (External) Nonreactive Nonreactive   ABO/Rh type and screen     Status: None    Narrative    The following orders were created for panel order ABO/Rh type and screen.  Procedure                               Abnormality         Status                     ---------                               -----------         ------                     Adult Type and Screen[[id number]]                            Edited Result - FINAL        Please view results for these tests on the individual orders.       MEDICATIONS:    Current Facility-Administered Medications:      acetaminophen (TYLENOL) tablet 650 mg, 650 mg, Oral, Q4H PRN, Mona Low DO     aspirin (ASA) chewable tablet 81 mg, 81 mg, Oral, Daily, Navya Goldberg MD, 81 mg at 06/26/22 0944     betamethasone acet & sod phos (CELESTONE) injection 12 mg, 12 mg, Intramuscular, Once, Navya Goldberg MD     diphenhydrAMINE (BENADRYL) capsule 25 mg, 25 mg, Oral, Q6H PRN **OR** diphenhydrAMINE (BENADRYL) injection 25 mg, 25 mg, Intravenous, Q6H PRN, Mona Low DO     metoclopramide (REGLAN) injection 10 mg, 10 mg, Intravenous, Q6H  PRN **OR** metoclopramide (REGLAN) tablet 10 mg, 10 mg, Oral, Q6H PRN, Mona Low DO     No Tdap Needed - Assessment: Patient does not need Tdap vaccine, , Does not apply, Continuous PRN, Mona Low DO     ondansetron (ZOFRAN ODT) ODT tab 4 mg, 4 mg, Oral, Q6H PRN **OR** ondansetron (ZOFRAN) injection 4 mg, 4 mg, Intravenous, Q6H PRN, Mona Low DO     prenatal multivitamin w/iron per tablet 1 tablet, 1 tablet, Oral, Daily, Mona Low DO, 1 tablet at 06/26/22 0944     prochlorperazine (COMPAZINE) injection 10 mg, 10 mg, Intravenous, Q6H PRN **OR** prochlorperazine (COMPAZINE) tablet 10 mg, 10 mg, Oral, Q6H PRN **OR** prochlorperazine (COMPAZINE) suppository 25 mg, 25 mg, Rectal, Q12H PRN, Mona Low DO    ASSESSMENT:  32 year old [id number] at 30w1d admitted for observation for fetal decelerations in the setting of FGR 4%ile    PLAN:  I discussed FHT with Gil and her .  Overall, FHT is reassuring and there is no indication to deliver imminently. However, she continues to have multiple decelerations overnight which are especially concerning in the setting of FGR.    - Will plan to continue monitoring throughout the day today  - M to evaluate tomorrow and to give recommendations for further monitoring outpatient vs inpatient    - s/p BTMZ 6/21 (rescue). Declined second rescue dose   - Would be a candidate for magnesium for fetal neuroprotection if delivery <32w  - If patient remains in hospital after tomorrow, consider starting anticoagulation for stasis     Zakiya Glass MD

## 2022-06-26 NOTE — PROVIDER NOTIFICATION
06/26/22 1615   Vital Signs   Oximeter Heart Rate 86 bpm   BP (!) 141/87     IHOB updated on increased BP with no symptoms of preeclampsia. Will follow up with a recheck in 15 minutes. MD also updated on category II FHR tracing and interventions provided. No new orders at this time.

## 2022-06-26 NOTE — PROGRESS NOTES
[id number]- Pt off continuous monitoring to shower.   1312- RN at bedside, Pt feeling grateful and much better after her shower. SCD's placed back on, complete linen and tegaderm dressing change done. Pt sitting up in bed waiting for lunch to arrive. No new concerns at this time.

## 2022-06-26 NOTE — PLAN OF CARE
Problem: Plan of Care - These are the overarching goals to be used throughout the patient stay.    Goal: Plan of Care Review/Shift Note  Description: The Plan of Care Review/Shift note should be completed every shift.  The Outcome Evaluation is a brief statement about your assessment that the patient is improving, declining, or no change.  This information will be displayed automatically on your shift note.  Outcome: Ongoing, Progressing  Flowsheets (Taken 6/26/2022 1347)  Plan of Care Reviewed With:   patient   spouse  Overall Patient Progress: no change     Problem: Maternal-Fetal Wellbeing  Goal: Optimal Maternal-Fetal Wellbeing  Outcome: Ongoing, Progressing       Continued plan of care discussed with pt and . VSS and pt extremely satisfied that she was able to shower. Plan is for MFM to see pt on 6/27/22 and provide pt with an updated plan of care. No new concerns at this time.

## 2022-06-27 ENCOUNTER — HOSPITAL ENCOUNTER (INPATIENT)
Facility: HOSPITAL | Age: 32
End: 2022-06-27
Attending: OBSTETRICS & GYNECOLOGY | Admitting: OBSTETRICS & GYNECOLOGY
Payer: COMMERCIAL

## 2022-06-27 VITALS
SYSTOLIC BLOOD PRESSURE: 126 MMHG | HEART RATE: 85 BPM | OXYGEN SATURATION: 98 % | DIASTOLIC BLOOD PRESSURE: 76 MMHG | RESPIRATION RATE: 16 BRPM | TEMPERATURE: 97.8 F

## 2022-06-27 DIAGNOSIS — O36.5990 PREGNANCY AFFECTED BY FETAL GROWTH RESTRICTION: Primary | ICD-10-CM

## 2022-06-27 PROCEDURE — 250N000013 HC RX MED GY IP 250 OP 250 PS 637: Performed by: OBSTETRICS & GYNECOLOGY

## 2022-06-27 RX ADMIN — PRENATAL VIT W/ FE FUMARATE-FA TAB 27-0.8 MG 1 TABLET: 27-0.8 TAB at 09:08

## 2022-06-27 RX ADMIN — ASPIRIN 81 MG CHEWABLE TABLET 81 MG: 81 TABLET CHEWABLE at 09:07

## 2022-06-27 ASSESSMENT — ACTIVITIES OF DAILY LIVING (ADL)
ADLS_ACUITY_SCORE: 18

## 2022-06-27 NOTE — PROGRESS NOTES
I reviewed with Gil her fetal heart rate tracing.  Over the course of the day, the tracing has been category 1 and reassuring.  Overnight last night there were three episodes of variable decelerations, surrounded by category 1.  We reviewed that given the largely reassuring tracing, it is reasonable for outpatient management.    We also reviewed that both inside and outside the hospital there is a risk of stillbirth, and that acute events cannot be predicted or prevented by EFM.  Kick counts were reviewed.  Gil voices understanding of the risk of stillbirth, and strongly desires discharge to home.  I think this is reasonable at this time.  She will return to our office on Wednesday for NST/UAR.  She was encouraged to call with any change in fetal movement.    Radha Bansal MD  Maternal Fetal Medicine

## 2022-06-27 NOTE — PROGRESS NOTES
1040 Dr Bansal from [facility] was here to see pt. She said that she would let the pt to be discharged and follow up with her Wednesday 06/29/2022. Dr Fang was requested to call Dr Bansal regarding her recommendation.   1350 pt was discharged home per orders. Discharged instructions were given. Pt verbalized to understand the instructions. Fetal heart tones category one at discharge time.

## 2022-06-27 NOTE — PLAN OF CARE
"Pt VSS. Pt denies pain. Pt stated she is not feeling any contractions. Pt will be seen by MFM today. Pt stated her  will be able to be here at 1200 and would like for him to be present when MFM is here.     Problem: Plan of Care - These are the overarching goals to be used throughout the patient stay.    Goal: Plan of Care Review/Shift Note  Description: The Plan of Care Review/Shift note should be completed every shift.  The Outcome Evaluation is a brief statement about your assessment that the patient is improving, declining, or no change.  This information will be displayed automatically on your shift note.  Outcome: Ongoing, Progressing  Goal: Patient-Specific Goal (Individualized)  Description: You can add care plan individualizations to a care plan. Examples of Individualization might be:  \"Parent requests to be called daily at 9am for status\", \"I have a hard time hearing out of my right ear\", or \"Do not touch me to wake me up as it startles me\".  Outcome: Ongoing, Progressing  Goal: Absence of Hospital-Acquired Illness or Injury  Outcome: Ongoing, Progressing  Goal: Optimal Comfort and Wellbeing  Outcome: Ongoing, Progressing  Goal: Readiness for Transition of Care  Outcome: Ongoing, Progressing     Problem: Maternal-Fetal Wellbeing  Goal: Optimal Maternal-Fetal Wellbeing  Outcome: Ongoing, Progressing     "

## 2022-06-27 NOTE — PROGRESS NOTES
Pt had 4 deep decels. Dr Goldberg updated and RN asked her to review the strip. MD reviewed and gave orders to give 500 ml bolus of LR if pt has another decel. Pt was turned and has not had another decel. Will continue to monitor.

## 2022-06-27 NOTE — PLAN OF CARE
"  Problem: Plan of Care - These are the overarching goals to be used throughout the patient stay.    Goal: Plan of Care Review/Shift Note  Description: The Plan of Care Review/Shift note should be completed every shift.  The Outcome Evaluation is a brief statement about your assessment that the patient is improving, declining, or no change.  This information will be displayed automatically on your shift note.  Outcome: Ongoing, Progressing  Goal: Patient-Specific Goal (Individualized)  Description: You can add care plan individualizations to a care plan. Examples of Individualization might be:  \"Parent requests to be called daily at 9am for status\", \"I have a hard time hearing out of my right ear\", or \"Do not touch me to wake me up as it startles me\".  Outcome: Ongoing, Progressing  Goal: Absence of Hospital-Acquired Illness or Injury  Outcome: Ongoing, Progressing  Goal: Optimal Comfort and Wellbeing  Outcome: Ongoing, Progressing  Goal: Readiness for Transition of Care  Outcome: Ongoing, Progressing     Problem: Maternal-Fetal Wellbeing  Goal: Optimal Maternal-Fetal Wellbeing  Outcome: Ongoing, Progressing  Pt doing well, VVS, Fetal heart tone category 11, periods of occasional decels but with moderate variabilities and accels  Pt requested to be off the monitor to freshen up for the night.     "

## 2022-06-27 NOTE — DISCHARGE SUMMARY
[facility] Discharge Summary    Gil Chaudhari MRN# [id number]   Age: 32 year old YOB: 1990     Date of Admission:  6/21/2022  Date of Discharge::  6/27/2022  Admitting Physician:  Mona Low DO  Discharge Physician:  Nely Fang MD             Admission Diagnoses:   Encounter for triage in pregnant patient [Z36.89]  Pregnancy affected by fetal growth restriction [O36.5990]  Unicornuate Uterus  Failed Glucose Tolerance Test  Episodic and spontaneous late FHR decelerations on external fetal monitoring.          Discharge Diagnosis:   same          Procedures:   Continuous fetal monitoring, serial BPPs, rescue dose of betamethasone          Medications Prior to Admission:     Medications Prior to Admission   Medication Sig Dispense Refill Last Dose     aspirin (ASA) 81 MG chewable tablet Take 81 mg by mouth daily   6/21/2022 at Unknown time     biotin 1000 MCG TABS tablet Take 1,000 mcg by mouth daily   6/21/2022 at Unknown time     calcium carbonate 600 mg-vitamin D 400 units (CALTRATE) 600-400 MG-UNIT per tablet Take 1 tablet by mouth 2 times daily   Past Week at Unknown time     Docosahexaenoic Acid (DHA) 200 MG capsule Take 200 mg by mouth daily   6/21/2022 at Unknown time     Prenatal Vit-Fe Fumarate-FA (PRENATAL MULTIVITAMIN  PLUS IRON) 27-1 MG TABS Take by mouth daily   6/20/2022 at Unknown time             Discharge Medications:     Current Discharge Medication List      CONTINUE these medications which have NOT CHANGED    Details   aspirin (ASA) 81 MG chewable tablet Take 81 mg by mouth daily      biotin 1000 MCG TABS tablet Take 1,000 mcg by mouth daily      calcium carbonate 600 mg-vitamin D 400 units (CALTRATE) 600-400 MG-UNIT per tablet Take 1 tablet by mouth 2 times daily      Docosahexaenoic Acid (DHA) 200 MG capsule Take 200 mg by mouth daily      Prenatal Vit-Fe Fumarate-FA (PRENATAL MULTIVITAMIN  PLUS IRON) 27-1 MG TABS Take by mouth daily                    Consultations:   Consultation during this admission received from [facility]          Brief History of Illness:    3 para 0 who presents from maternal-fetal medicine for continuous fetal monitoring.  Prenatal care has been with [facility]  in [place] but she was sent to [facility] due to gestational age.  Pregnancy has been complicated by unknown unicornuate uterus and severe fetal growth restriction.  At the time of her appointment she had spontaneously fetal heart rate decelerations on her nonstress test and she was sent for further monitoring.  Patient received a rescue dose of betamethasone.  Patient had continuous EFM throughout her hospital stay that demonstrated an appropriate baseline FHR for gestational age, minimal to moderate variability, episodic but non-recurrent late FHR decelerations at most one per hour and interval growth ultrasound demonstrating estimated fetal weight in 4%ile.          Hospital Course:   [facility] consult on 22 and share decision making with patient and partner allowed for discharge to home with daily fetal kick counts and close outpatient follow-up with return visit in 2 days.  Of note, patient reported to fail her outpatient glucose tolerance test and has not been able to schedule with diabetes nurse educator due to hospitalization.          Discharge Instructions and Follow-Up:   Discharge diet: Regular   Discharge activity: Activity as tolerated, no strenuous activity    Return to [facility] for interval followup   Return to primary OBGYN this week.  Call endocrine to followup on referral for gestational diabetes.               Discharge Disposition:   Discharged to home                         Total time spent on day if discharge, in review of medical records and hospital course and discussing care with additional providers is 30 minutes.    Nely Fang MD FACOG  [facility]  [phone]

## 2022-06-29 ENCOUNTER — ANCILLARY PROCEDURE (OUTPATIENT)
Dept: ULTRASOUND IMAGING | Facility: HOSPITAL | Age: 32
End: 2022-06-29
Attending: OBSTETRICS & GYNECOLOGY
Payer: COMMERCIAL

## 2022-06-29 ENCOUNTER — OFFICE VISIT (OUTPATIENT)
Dept: MATERNAL FETAL MEDICINE | Facility: HOSPITAL | Age: 32
End: 2022-06-29
Attending: OBSTETRICS & GYNECOLOGY
Payer: COMMERCIAL

## 2022-06-29 DIAGNOSIS — O36.5990 PREGNANCY AFFECTED BY FETAL GROWTH RESTRICTION: ICD-10-CM

## 2022-06-29 PROCEDURE — 76820 UMBILICAL ARTERY ECHO: CPT

## 2022-06-29 PROCEDURE — 76815 OB US LIMITED FETUS(S): CPT | Mod: 26 | Performed by: OBSTETRICS & GYNECOLOGY

## 2022-06-29 PROCEDURE — 59025 FETAL NON-STRESS TEST: CPT

## 2022-06-29 PROCEDURE — 59025 FETAL NON-STRESS TEST: CPT | Mod: 26 | Performed by: OBSTETRICS & GYNECOLOGY

## 2022-06-29 PROCEDURE — 76820 UMBILICAL ARTERY ECHO: CPT | Mod: 26 | Performed by: OBSTETRICS & GYNECOLOGY

## 2022-06-29 NOTE — PROGRESS NOTES
"Please see \"Imaging\" tab under Chart Review for full details.    Radha Bansal MD  Maternal Fetal Medicine    "

## 2022-07-01 NOTE — TELEPHONE ENCOUNTER
Okay to offer 2-230pm with clovis on 7/6/22.     If does not work for patient offer different CDE appointment.    Please obtain referral from OBGYN

## 2022-07-01 NOTE — TELEPHONE ENCOUNTER
Pt called again stating she has been waiting on a call and has not received one yet. Please call pt. Thank you.

## 2022-07-01 NOTE — TELEPHONE ENCOUNTER
Patient is scheduled to see Tisha Pinzon on 7/6 at 2:00. The appointment was okayed by Tisha and the patient has been notified and accepted the appt.

## 2022-07-06 ENCOUNTER — OFFICE VISIT (OUTPATIENT)
Dept: MATERNAL FETAL MEDICINE | Facility: HOSPITAL | Age: 32
End: 2022-07-06
Attending: OBSTETRICS & GYNECOLOGY
Payer: COMMERCIAL

## 2022-07-06 ENCOUNTER — ANCILLARY PROCEDURE (OUTPATIENT)
Dept: ULTRASOUND IMAGING | Facility: HOSPITAL | Age: 32
End: 2022-07-06
Attending: OBSTETRICS & GYNECOLOGY
Payer: COMMERCIAL

## 2022-07-06 ENCOUNTER — OFFICE VISIT (OUTPATIENT)
Dept: EDUCATION SERVICES | Facility: CLINIC | Age: 32
End: 2022-07-06
Payer: COMMERCIAL

## 2022-07-06 ENCOUNTER — TRANSFERRED RECORDS (OUTPATIENT)
Dept: HEALTH INFORMATION MANAGEMENT | Facility: CLINIC | Age: 32
End: 2022-07-06

## 2022-07-06 DIAGNOSIS — O24.410 DIET CONTROLLED GESTATIONAL DIABETES MELLITUS (GDM) IN THIRD TRIMESTER: Primary | ICD-10-CM

## 2022-07-06 DIAGNOSIS — O36.5990 PREGNANCY AFFECTED BY FETAL GROWTH RESTRICTION: ICD-10-CM

## 2022-07-06 DIAGNOSIS — O36.5990 PREGNANCY AFFECTED BY FETAL GROWTH RESTRICTION: Primary | ICD-10-CM

## 2022-07-06 PROCEDURE — 59025 FETAL NON-STRESS TEST: CPT | Mod: 26 | Performed by: OBSTETRICS & GYNECOLOGY

## 2022-07-06 PROCEDURE — 76820 UMBILICAL ARTERY ECHO: CPT

## 2022-07-06 PROCEDURE — G0108 DIAB MANAGE TRN  PER INDIV: HCPCS

## 2022-07-06 PROCEDURE — 76820 UMBILICAL ARTERY ECHO: CPT | Mod: 26 | Performed by: OBSTETRICS & GYNECOLOGY

## 2022-07-06 PROCEDURE — 59025 FETAL NON-STRESS TEST: CPT

## 2022-07-06 PROCEDURE — 99207 PR NO CHARGE LOS: CPT | Performed by: OBSTETRICS & GYNECOLOGY

## 2022-07-06 PROCEDURE — 76815 OB US LIMITED FETUS(S): CPT | Mod: 26 | Performed by: OBSTETRICS & GYNECOLOGY

## 2022-07-06 RX ORDER — BLOOD SUGAR DIAGNOSTIC
STRIP MISCELLANEOUS
Qty: 150 STRIP | Refills: 3 | Status: SHIPPED | OUTPATIENT
Start: 2022-07-06 | End: 2022-09-21

## 2022-07-06 RX ORDER — LANCETS
EACH MISCELLANEOUS
Qty: 100 EACH | Refills: 3 | Status: SHIPPED | OUTPATIENT
Start: 2022-07-06 | End: 2022-09-21

## 2022-07-06 NOTE — PROGRESS NOTES
"Diabetes Self-Management Education & Support    SUBJECTIVE/OBJECTIVE:  Presents for education related to gestational diabetes.  Cultural Influences/Ethnic Background:  Not  or       Estimated Date of Delivery: Sep 3, 2022   OB: [facility] ([facility]vette Walden) Now seeing MFM   Preg #: 3, 2 misc.     A1c 1/4/22 at 5.1%    1 hour OGTT 6/9/22  166    3 hour OGTT 6/15/22  Fasting  76  1 hour  188  2 hour  166       ASSESSMENT:  Pt seen today for initial visit. She if familiar w/ carb foods (mostly, did not know about milk or quinoa or fruit) and has been trying to watch her diet a little bit. She switched to brown rice and has been trying to eat healthy. Pt reports usually eating 2 meals/day and snacks. Discussed smaller more frequent meals, increasing protein and non carb vegetables. Pt is concerned that she would need insulin and \"would like to do everything possible to avoid that.\" We reviewed GDM meal plan at length as well as activity.     INTERVENTION:  Patient was instructed on Accu-Chek Guide Me meter and was able to provide an accurate return demonstration. Patient's blood glucose reading today was 165 mg/dL about 2 hours after lunch. Pt reports she ate many carbs for lunch.    Educational topics covered today:  GDM diagnosis, pathophysiology, Risks and Complications of GDM, Means of controlling GDM, Using a Blood Glucose Monitor, Blood Glucose Goals, Logging and Interpreting Glucose Results, Ketone Testing, When to Call a Diabetes Educator or OB Provider, Healthy Eating During Pregnancy, Counting Carbohydrates, Meal Planning for GDM, and Physical Activity    Educational materials provided today:   [facility] Understanding Gestational Diabetes  GDM Log Book  Accu-Chek Guide Me meter kit    Pt verbalized understanding of concepts discussed and recommendations provided today.     PLAN:  Check glucose 4 times daily, before breakfast and 1 hour after each meal.   Check Ketones daily.  Physical " activity recommended: walking/staying active after meals.    Meal plan: 30 carbs at breakfast, 60 carbs at lunch, 60 carbs at supper, 30 carbs at 3 snacks a day.  Follow consistent CHO meal plan, eat CHO and protein/fat at all meals/snacks.    Call/e-mail/MyChart message diabetes educator if 3 or more blood sugars are above the goal in 1 week, if ketones are positive, or with questions/concerns.    Pt will f/u next week w/ CDE, will call sooner w/ any concerns.     Time Spent: 60 minutes  Encounter Type: Individual    Any diabetes medication dose changes were made via the CDE Protocol and Collaborative Practice Agreement with the patient's referring provider. A copy of this encounter was shared with the provider.

## 2022-07-06 NOTE — LETTER
"    7/6/2022         RE: Gil Chaudhari  [id number] [id number]        Dear Colleague,    Thank you for referring your patient, Gil Chaudhari, to the [facility]. Please see a copy of my visit note below.    Diabetes Self-Management Education & Support    SUBJECTIVE/OBJECTIVE:  Presents for education related to gestational diabetes.  Cultural Influences/Ethnic Background:  Not  or       Estimated Date of Delivery: Sep 3, 2022   OB: [facility] (Gale Rollins) Now seeing MFM   Preg #: 3, 2 misc.     Labs to not pull in but pt reports failing 1 hour and 3 hour OGTT.   1 hour OGTT  No results found for: GLU1      3 hour OGTT    Fasting  No results found for: GTTGF    1 hour  No results found for: GTTG1    2 hour  No results found for: GTTG2    3 hour  No results found for: GTTG3       ASSESSMENT:  Pt seen today for initial visit. She if familiar w/ carb foods (mostly, did not know about milk or quinoa or fruit) and has been trying to watch her diet a little bit. She switched to brown rice and has been trying to eat healthy. Pt reports usually eating 2 meals/day and snacks. Discussed smaller more frequent meals, increasing protein and non carb vegetables. Pt is concerned that she would need insulin and \"would like to do everything possible to avoid that.\" We reviewed GDM meal plan at length as well as activity.     INTERVENTION:  Patient was instructed on Accu-Chek Guide Me meter and was able to provide an accurate return demonstration. Patient's blood glucose reading today was 165 mg/dL about 2 hours after lunch. Pt reports she ate many carbs for lunch.    Educational topics covered today:  GDM diagnosis, pathophysiology, Risks and Complications of GDM, Means of controlling GDM, Using a Blood Glucose Monitor, Blood Glucose Goals, Logging and Interpreting Glucose Results, Ketone Testing, When to Call a Diabetes Educator or OB Provider, Healthy Eating During Pregnancy, " Counting Carbohydrates, Meal Planning for GDM, and Physical Activity    Educational materials provided today:   [facility] Understanding Gestational Diabetes  GDM Log Book  Accu-Chek Guide Me meter kit    Pt verbalized understanding of concepts discussed and recommendations provided today.     PLAN:  Check glucose 4 times daily, before breakfast and 1 hour after each meal.   Check Ketones daily.  Physical activity recommended: walking/staying active after meals.    Meal plan: 30 carbs at breakfast, 60 carbs at lunch, 60 carbs at supper, 30 carbs at 3 snacks a day.  Follow consistent CHO meal plan, eat CHO and protein/fat at all meals/snacks.    Call/e-mail/Gelesis message diabetes educator if 3 or more blood sugars are above the goal in 1 week, if ketones are positive, or with questions/concerns.    Pt will f/u next week w/ CDE, will call sooner w/ any concerns.     Time Spent: 60 minutes  Encounter Type: Individual    Any diabetes medication dose changes were made via the CDE Protocol and Collaborative Practice Agreement with the patient's referring provider. A copy of this encounter was shared with the provider.

## 2022-07-06 NOTE — PROGRESS NOTES
The patient was seen for an ultrasound in the [facility] at the [facility] today.  For a detailed report of the ultrasound examination, please see the ultrasound report which can be found under the imaging tab.    Jaylene Badillo MD  , OB/GYN  Maternal-Fetal Medicine  [phone] (Pager)

## 2022-07-14 ENCOUNTER — ALLIED HEALTH/NURSE VISIT (OUTPATIENT)
Dept: EDUCATION SERVICES | Facility: CLINIC | Age: 32
End: 2022-07-14
Payer: COMMERCIAL

## 2022-07-14 VITALS — WEIGHT: 172 LBS | BODY MASS INDEX: 28.62 KG/M2

## 2022-07-14 DIAGNOSIS — O24.410 DIET CONTROLLED GESTATIONAL DIABETES MELLITUS (GDM) IN THIRD TRIMESTER: Primary | ICD-10-CM

## 2022-07-14 PROCEDURE — G0108 DIAB MANAGE TRN  PER INDIV: HCPCS

## 2022-07-14 NOTE — LETTER
2022         RE: Gil Chaudhari  [address] [address]        Dear Colleague,    Thank you for referring your patient, Gil Chaudhari, to the [facility]. Please see a copy of my visit note below.    Diabetes Self-Management Education & Support    SUBJECTIVE/OBJECTIVE:  Presents for education related to gestational diabetes.    Hospital planned for delivery: [facility]  Number of previous pregnancies: 0  Had any babies over 9 lbs: No  Previously had Gestational Diabetes: No  Have you ever had thyroid problems or taken thyroid medication?: No  Heart disease, mitral valve prolapse or rheumatic fever?: No  Hypertension : No  High Cholesterol: No  High Triglycerides: No  Do you use tobacco products?: No  Do you drink beer, wine or hard liquor?: No    Cultural Influences/Ethnic Background:  Not  or       Wt 78 kg (172 lb)   LMP 2021   BMI 28.62 kg/m          Estimated Date of Delivery: Sep 3, 2022    Blood Glucose/Ketone Log:   Date Ketones Fasting Post Breakfast Post Lunch Post Supper   7.07 neg  100 112 178   7.08 neg  130  196   7.09 neg  129 170 119   7.10 neg  120  179   7.11 neg  113  139   7.12 neg  154  126   7.13 neg 90          Lifestyle and Health Behaviors:  Pre-pregnancy weight (lbs): 56  Barrier to exercise: Physical limitation  Cultural/Gnosticism diet restrictions?: No  Meal planning/habits: Carb counting  Pre- vitamin?: Yes  Supplements?: Yes  List supplements currently taking: (P) Prenatals, dha, biotin, low dose aspirin  Experiencing nausea?: No  Experiencing heartburn?: Yes    Healthy Coping:  Informal Support system:: Parent, Spouse    Current Management:       ASSESSMENT:  Gil is here alone today for her follow up for Gestational Diabetes.    She is feeling well.  No trouble with nausea, dizziness or sweating.  Stated she is hungry between her meals and snacks.  She eats a traditional  diet, mainly vegetarian.  She has  added in fish and chicken since becoming pregnant although does not find meat appetizing.    She is struggling with making adjustments to her diet.  She is not eating white rice, but brown rice is causing elevations in her glucose.  She has learned her homemade Chapati is causing elevations as well.  Discussed we do not want her to be hungry during her day.    Discussed possible complications if high glucose remains untreated.  Since not all readings at dinner are high, will wait 1 week to see if she can get these in control without feeling too hungry during her day.  Discussed best treatment would be insulin as it is safe for her and the baby.    All additional questions and concerns addressed today.    Plan:  Follow up in 1 week, if after meal readings remain high. strongly encourage starting insulin.    INTERVENTION:  Educational topics covered today:  What to expect after delivery, Future testing for Type 2 diabetes (2 hour OGTT at 6 week post-partum check-up and annual fasting blood glucose level), Risk of GDM and planning ahead for future pregnancies, Recommended lifestyle interventions for reducing the risk of Type 2 Diabetes, When to Call a Diabetes Educator or OB Provider    Educational Materials provided today:  [place] Preventing Diabetes    PLAN:  Check glucose 4 times daily.  Check ketones once a week when readings are consistently negative.  Continue with recommended physical activity.  Continue to follow recommended meal plan: 15-30 carbs at breakfast, 45-60 carbs at lunch, 45-60 carbs at supper, 15-30 carbs at snacks.  Follow consistent CHO meal plan, eat CHO and protein/fat at all meals/snacks.    Call/e-mail/MyChart message diabetes educator if 3 or more blood sugars are above the goal in 1 week or if ketones are positive.     The service provided today was under the supervising provider, Sharmin Ortiz, who was available if needed.     Time Spent: 30 minutes  Encounter Type: Individual    Any  diabetes medication dose changes were made via the CDE Protocol and Collaborative Practice Agreement with the patient's OB/GYN provider. A copy of this encounter was shared with the provider.

## 2022-07-14 NOTE — PROGRESS NOTES
Diabetes Self-Management Education & Support    SUBJECTIVE/OBJECTIVE:  Presents for education related to gestational diabetes.    Hospital planned for delivery: [facility]  Number of previous pregnancies: 0  Had any babies over 9 lbs: No  Previously had Gestational Diabetes: No  Have you ever had thyroid problems or taken thyroid medication?: No  Heart disease, mitral valve prolapse or rheumatic fever?: No  Hypertension : No  High Cholesterol: No  High Triglycerides: No  Do you use tobacco products?: No  Do you drink beer, wine or hard liquor?: No    Cultural Influences/Ethnic Background:  Not  or       Wt 78 kg (172 lb)   LMP 2021   BMI 28.62 kg/m          Estimated Date of Delivery: Sep 3, 2022    Blood Glucose/Ketone Log:   Date Ketones Fasting Post Breakfast Post Lunch Post Supper   7.07 neg  100 112 178   7.08 neg  130  196   7.09 neg  129 170 119   7.10 neg  120  179   7.11 neg  113  139   7.12 neg  154  126   7.13 neg 90          Lifestyle and Health Behaviors:  Pre-pregnancy weight (lbs): 56  Barrier to exercise: Physical limitation  Cultural/Sabianism diet restrictions?: No  Meal planning/habits: Carb counting  Pre- vitamin?: Yes  Supplements?: Yes  List supplements currently taking: (P) Prenatals, dha, biotin, low dose aspirin  Experiencing nausea?: No  Experiencing heartburn?: Yes    Healthy Coping:  Informal Support system:: Parent, Spouse    Current Management:       ASSESSMENT:  Gil is here alone today for her follow up for Gestational Diabetes.    She is feeling well.  No trouble with nausea, dizziness or sweating.  Stated she is hungry between her meals and snacks.  She eats a traditional [place] diet, mainly vegetarian.  She has added in fish and chicken since becoming pregnant although does not find meat appetizing.    She is struggling with making adjustments to her diet.  She is not eating white rice, but brown rice is causing elevations in her glucose.  She has learned  her homemade Chapati is causing elevations as well.  Discussed we do not want her to be hungry during her day.    Discussed possible complications if high glucose remains untreated.  Since not all readings at dinner are high, will wait 1 week to see if she can get these in control without feeling too hungry during her day.  Discussed best treatment would be insulin as it is safe for her and the baby.    All additional questions and concerns addressed today.    Plan:  Follow up in 1 week, if after meal readings remain high. strongly encourage starting insulin.    INTERVENTION:  Educational topics covered today:  What to expect after delivery, Future testing for Type 2 diabetes (2 hour OGTT at 6 week post-partum check-up and annual fasting blood glucose level), Risk of GDM and planning ahead for future pregnancies, Recommended lifestyle interventions for reducing the risk of Type 2 Diabetes, When to Call a Diabetes Educator or OB Provider    Educational Materials provided today:  [place] Preventing Diabetes    PLAN:  Check glucose 4 times daily.  Check ketones once a week when readings are consistently negative.  Continue with recommended physical activity.  Continue to follow recommended meal plan: 15-30 carbs at breakfast, 45-60 carbs at lunch, 45-60 carbs at supper, 15-30 carbs at snacks.  Follow consistent CHO meal plan, eat CHO and protein/fat at all meals/snacks.    Call/e-mail/MyChart message diabetes educator if 3 or more blood sugars are above the goal in 1 week or if ketones are positive.     The service provided today was under the supervising provider, Sharmin Ortiz, who was available if needed.     Time Spent: 30 minutes  Encounter Type: Individual    Any diabetes medication dose changes were made via the CDE Protocol and Collaborative Practice Agreement with the patient's OB/GYN provider. A copy of this encounter was shared with the provider.

## 2022-07-15 ENCOUNTER — ANCILLARY PROCEDURE (OUTPATIENT)
Dept: ULTRASOUND IMAGING | Facility: HOSPITAL | Age: 32
End: 2022-07-15
Attending: OBSTETRICS & GYNECOLOGY
Payer: COMMERCIAL

## 2022-07-15 ENCOUNTER — OFFICE VISIT (OUTPATIENT)
Dept: MATERNAL FETAL MEDICINE | Facility: HOSPITAL | Age: 32
End: 2022-07-15
Attending: OBSTETRICS & GYNECOLOGY
Payer: COMMERCIAL

## 2022-07-15 DIAGNOSIS — O36.5990 PREGNANCY AFFECTED BY FETAL GROWTH RESTRICTION: ICD-10-CM

## 2022-07-15 DIAGNOSIS — O36.5990 PREGNANCY AFFECTED BY FETAL GROWTH RESTRICTION: Primary | ICD-10-CM

## 2022-07-15 DIAGNOSIS — O26.90 PREGNANCY RELATED CONDITION, ANTEPARTUM: ICD-10-CM

## 2022-07-15 PROCEDURE — 76816 OB US FOLLOW-UP PER FETUS: CPT | Mod: 26 | Performed by: OBSTETRICS & GYNECOLOGY

## 2022-07-15 PROCEDURE — 76820 UMBILICAL ARTERY ECHO: CPT

## 2022-07-15 PROCEDURE — 59025 FETAL NON-STRESS TEST: CPT

## 2022-07-15 PROCEDURE — 99215 OFFICE O/P EST HI 40 MIN: CPT | Mod: 25 | Performed by: OBSTETRICS & GYNECOLOGY

## 2022-07-15 PROCEDURE — 76820 UMBILICAL ARTERY ECHO: CPT | Mod: 26 | Performed by: OBSTETRICS & GYNECOLOGY

## 2022-07-15 PROCEDURE — 59025 FETAL NON-STRESS TEST: CPT | Mod: 26 | Performed by: OBSTETRICS & GYNECOLOGY

## 2022-07-15 NOTE — PROGRESS NOTES
"Please see \"Imaging\" tab under \"Chart Review\" for details of today's US.    Mona Miller, DO    "

## 2022-07-19 ENCOUNTER — DOCUMENTATION ONLY (OUTPATIENT)
Dept: MATERNAL FETAL MEDICINE | Facility: CLINIC | Age: 32
End: 2022-07-19

## 2022-07-19 NOTE — PROGRESS NOTES
Reviewed case during genetics case conference. Recommendations for  include inpatient genetics consult. Orders will be placed in the fetal chart.     Roz Short MS, [facility]  Licensed Genetic Counselor   [facility]  Maternal Fetal Medicine  [email or web address]  Office: [phone]  Pager [phone]  MFM: [phone]   Fax: [phone]

## 2022-07-21 ENCOUNTER — OFFICE VISIT (OUTPATIENT)
Dept: MATERNAL FETAL MEDICINE | Facility: HOSPITAL | Age: 32
End: 2022-07-21
Attending: OBSTETRICS & GYNECOLOGY
Payer: COMMERCIAL

## 2022-07-21 ENCOUNTER — ANCILLARY PROCEDURE (OUTPATIENT)
Dept: ULTRASOUND IMAGING | Facility: HOSPITAL | Age: 32
End: 2022-07-21
Attending: OBSTETRICS & GYNECOLOGY
Payer: COMMERCIAL

## 2022-07-21 ENCOUNTER — ALLIED HEALTH/NURSE VISIT (OUTPATIENT)
Dept: EDUCATION SERVICES | Facility: CLINIC | Age: 32
End: 2022-07-21
Payer: COMMERCIAL

## 2022-07-21 VITALS — BODY MASS INDEX: 29.95 KG/M2 | WEIGHT: 180 LBS

## 2022-07-21 DIAGNOSIS — O24.410 DIET CONTROLLED GESTATIONAL DIABETES MELLITUS (GDM) IN THIRD TRIMESTER: Primary | ICD-10-CM

## 2022-07-21 DIAGNOSIS — O36.5990 PREGNANCY AFFECTED BY FETAL GROWTH RESTRICTION: ICD-10-CM

## 2022-07-21 PROCEDURE — G0108 DIAB MANAGE TRN  PER INDIV: HCPCS

## 2022-07-21 PROCEDURE — 99207 PR NO CHARGE LOS: CPT | Performed by: OBSTETRICS & GYNECOLOGY

## 2022-07-21 PROCEDURE — 76819 FETAL BIOPHYS PROFIL W/O NST: CPT

## 2022-07-21 PROCEDURE — 76818 FETAL BIOPHYS PROFILE W/NST: CPT | Mod: 26 | Performed by: OBSTETRICS & GYNECOLOGY

## 2022-07-21 PROCEDURE — 76820 UMBILICAL ARTERY ECHO: CPT | Mod: 26 | Performed by: OBSTETRICS & GYNECOLOGY

## 2022-07-21 PROCEDURE — 59025 FETAL NON-STRESS TEST: CPT | Mod: 26 | Performed by: OBSTETRICS & GYNECOLOGY

## 2022-07-21 NOTE — NURSING NOTE
NST Performed due to fetal growth restriction.  Dr. Bansal reviewed efm tracing. See NST/BPP Doc Flowsheet tab.

## 2022-07-21 NOTE — LETTER
7/21/2022         RE: Gil Chaudhari  [address] [facility] [address]        Dear Colleague,    Thank you for referring your patient, Gil Chaudhari, to the [facility]. Please see a copy of my visit note below.    Diabetes Self-Management Education & Support    SUBJECTIVE/OBJECTIVE:  Presents for education related to gestational diabetes.         Cultural Influences/Ethnic Background:  Not  or       Wt 81.6 kg (180 lb)   LMP 11/22/2021   BMI 29.95 kg/m      .    Estimated Date of Delivery: Sep 3, 2022    Blood Glucose/Ketone Log:   Date Ketones Fasting Post Breakfast Post Lunch Post Supper   7.20 N 88 112 -- 133   7.19 N 89 132 137 159   7.18 N 89 136 120 141   7.17 N 86 139 -- --   7.16 N 84 132 141 150   7.15 N 88 180 138 146   7.16 N 90 120 -- 147   180 result of 3 chapati and butter with tea latte  For dinner last night she tried cracked wheat for the first time.  Stated this made her feel full and satisfied with her meal where chapati has not been doing that.      Lifestyle and Health Behaviors:       Healthy Coping:       Current Management:       ASSESSMENT:  Gil is here alone today for her follow up on Gestational Diabetes.  Stated she has been trying hard to get her post dinner readings under better control.  After review, they are better than last week, still above goal with one exception, last night.  Stated she feels grains are a better carb choice for her than others.    Stated due to complications with the baby she will now be working with [facility] and delivering at [facility].  Informed this will not change her ability to work with me for her Gestational Diabetes.    She is currently not walking after meals, 30 minutes in the morning before breakfast.  She is adamant she does not want to take insulin.  Discussed walking for 5-10 minutes after meal, before check, can help bring glucose down.      All additional questions and concerns addressed  today.    PLAN:  Gil will work on having grains at dinner rather than chapati.  She will also start walking after dinner.  Will follow up next week.  Reiterated if her readings are not all under 140 after dinner next week, we will need to start insulin.    INTERVENTION:  Educational topics covered today:  What to expect after delivery, Future testing for Type 2 diabetes (2 hour OGTT at 6 week post-partum check-up and annual fasting blood glucose level), Risk of GDM and planning ahead for future pregnancies, Recommended lifestyle interventions for reducing the risk of Type 2 Diabetes, When to Call a Diabetes Educator or OB Provider    Educational Materials provided today:  [place] Preventing Diabetes    PLAN:  Check glucose 4 times daily.  Check ketones once a week when readings are consistently negative.  Continue with recommended physical activity.  Continue to follow recommended meal plan: 15-30 carbs at breakfast, 45-60 carbs at lunch, 45-60 carbs at supper, 15-30 carbs at snacks.  Follow consistent CHO meal plan, eat CHO and protein/fat at all meals/snacks.    Call/e-mail/ColorModuleshart message diabetes educator if 3 or more blood sugars are above the goal in 1 week or if ketones are positive.     The service provided today was under the supervising provider, Delia Joyner, who was available if needed.     Time Spent: 30 minutes  Encounter Type: Individual    Any diabetes medication dose changes were made via the CDE Protocol and Collaborative Practice Agreement with the patient's OB/GYN provider. A copy of this encounter was shared with the provider.

## 2022-07-21 NOTE — PROGRESS NOTES
Diabetes Self-Management Education & Support    SUBJECTIVE/OBJECTIVE:  Presents for education related to gestational diabetes.         Cultural Influences/Ethnic Background:  Not  or       Wt 81.6 kg (180 lb)   LMP 11/22/2021   BMI 29.95 kg/m      .    Estimated Date of Delivery: Sep 3, 2022    Blood Glucose/Ketone Log:   Date Ketones Fasting Post Breakfast Post Lunch Post Supper   7.20 N 88 112 -- 133   7.19 N 89 132 137 159   7.18 N 89 136 120 141   7.17 N 86 139 -- --   7.16 N 84 132 141 150   7.15 N 88 180 138 146   7.16 N 90 120 -- 147   180 result of 3 chapati and butter with tea latte  For dinner last night she tried cracked wheat for the first time.  Stated this made her feel full and satisfied with her meal where chapati has not been doing that.      Lifestyle and Health Behaviors:       Healthy Coping:       Current Management:       ASSESSMENT:  Gil is here alone today for her follow up on Gestational Diabetes.  Stated she has been trying hard to get her post dinner readings under better control.  After review, they are better than last week, still above goal with one exception, last night.  Stated she feels grains are a better carb choice for her than others.    Stated due to complications with the baby she will now be working with [facility] and delivering at [facility].  Informed this will not change her ability to work with me for her Gestational Diabetes.    She is currently not walking after meals, 30 minutes in the morning before breakfast.  She is adamant she does not want to take insulin.  Discussed walking for 5-10 minutes after meal, before check, can help bring glucose down.      All additional questions and concerns addressed today.    PLAN:  Gil will work on having grains at dinner rather than chapati.  She will also start walking after dinner.  Will follow up next week.  Reiterated if her readings are not all under 140 after dinner next week, we will need to start  insulin.    INTERVENTION:  Educational topics covered today:  What to expect after delivery, Future testing for Type 2 diabetes (2 hour OGTT at 6 week post-partum check-up and annual fasting blood glucose level), Risk of GDM and planning ahead for future pregnancies, Recommended lifestyle interventions for reducing the risk of Type 2 Diabetes, When to Call a Diabetes Educator or OB Provider    Educational Materials provided today:  [place] Preventing Diabetes    PLAN:  Check glucose 4 times daily.  Check ketones once a week when readings are consistently negative.  Continue with recommended physical activity.  Continue to follow recommended meal plan: 15-30 carbs at breakfast, 45-60 carbs at lunch, 45-60 carbs at supper, 15-30 carbs at snacks.  Follow consistent CHO meal plan, eat CHO and protein/fat at all meals/snacks.    Call/e-mail/2Catalyzehart message diabetes educator if 3 or more blood sugars are above the goal in 1 week or if ketones are positive.     The service provided today was under the supervising provider, Delia Joyner, who was available if needed.     Time Spent: 30 minutes  Encounter Type: Individual    Any diabetes medication dose changes were made via the CDE Protocol and Collaborative Practice Agreement with the patient's OB/GYN provider. A copy of this encounter was shared with the provider.

## 2022-07-24 ENCOUNTER — HEALTH MAINTENANCE LETTER (OUTPATIENT)
Age: 32
End: 2022-07-24

## 2022-07-25 ENCOUNTER — TRANSFERRED RECORDS (OUTPATIENT)
Dept: MATERNAL FETAL MEDICINE | Facility: CLINIC | Age: 32
End: 2022-07-25

## 2022-07-28 ENCOUNTER — OFFICE VISIT (OUTPATIENT)
Dept: MATERNAL FETAL MEDICINE | Facility: HOSPITAL | Age: 32
End: 2022-07-28
Attending: OBSTETRICS & GYNECOLOGY
Payer: COMMERCIAL

## 2022-07-28 ENCOUNTER — ANCILLARY PROCEDURE (OUTPATIENT)
Dept: ULTRASOUND IMAGING | Facility: HOSPITAL | Age: 32
End: 2022-07-28
Attending: OBSTETRICS & GYNECOLOGY
Payer: COMMERCIAL

## 2022-07-28 ENCOUNTER — ALLIED HEALTH/NURSE VISIT (OUTPATIENT)
Dept: EDUCATION SERVICES | Facility: CLINIC | Age: 32
End: 2022-07-28
Payer: COMMERCIAL

## 2022-07-28 VITALS — BODY MASS INDEX: 29.62 KG/M2 | WEIGHT: 178 LBS

## 2022-07-28 DIAGNOSIS — O36.5990 PREGNANCY AFFECTED BY FETAL GROWTH RESTRICTION: ICD-10-CM

## 2022-07-28 DIAGNOSIS — O24.410 DIET CONTROLLED GESTATIONAL DIABETES MELLITUS (GDM) IN THIRD TRIMESTER: Primary | ICD-10-CM

## 2022-07-28 DIAGNOSIS — O36.5990 PREGNANCY AFFECTED BY FETAL GROWTH RESTRICTION: Primary | ICD-10-CM

## 2022-07-28 PROCEDURE — 99214 OFFICE O/P EST MOD 30 MIN: CPT | Mod: 25 | Performed by: OBSTETRICS & GYNECOLOGY

## 2022-07-28 PROCEDURE — 59025 FETAL NON-STRESS TEST: CPT

## 2022-07-28 PROCEDURE — 76820 UMBILICAL ARTERY ECHO: CPT | Mod: 26 | Performed by: OBSTETRICS & GYNECOLOGY

## 2022-07-28 PROCEDURE — 76820 UMBILICAL ARTERY ECHO: CPT

## 2022-07-28 PROCEDURE — 76815 OB US LIMITED FETUS(S): CPT | Mod: 26 | Performed by: OBSTETRICS & GYNECOLOGY

## 2022-07-28 PROCEDURE — G0108 DIAB MANAGE TRN  PER INDIV: HCPCS

## 2022-07-28 PROCEDURE — 59025 FETAL NON-STRESS TEST: CPT | Mod: 26 | Performed by: OBSTETRICS & GYNECOLOGY

## 2022-07-28 NOTE — NURSING NOTE
"NST Performed due to fetal growth restriction.  Dr. Arceo reviewed efm tracing. See NST/BPP Doc Flowsheet tab.    This writer inquired patient regarding status of [facility]ARA to [facility] so she can deliver at [place]. Patient states she has not heard from anyone regarding SAHARA. RN called [facility] P[facility] and spoke with Allie ALVARES RN relayed times/dates of available appointments with patient and spouse but patient unable to schedule SAHARA appointment at this time. She states she is only \"available Tuesdays after 2pm and Fridays after 2pm.\" PCC phone number given to patient to call and schedule SAHARA visit. Patient verbalized understanding and agreeable. Patient states she will \"call tomorrow.\"     Yvon Harding RN on 7/28/2022 at 4:18 PM      "

## 2022-07-28 NOTE — LETTER
7/28/2022         RE: Gil Chaudhari  [address] [address]        Dear Colleague,    Thank you for referring your patient, Gil Chaudhari, to the [facility]. Please see a copy of my visit note below.    Diabetes Self-Management Education & Support    SUBJECTIVE/OBJECTIVE:  Presents for education related to gestational diabetes.         Cultural Influences/Ethnic Background:  Not  or       Wt 80.7 kg (178 lb)   LMP 11/22/2021   BMI 29.62 kg/m          Estimated Date of Delivery: Sep 3, 2022    Blood Glucose/Ketone Log:   Date Ketones Fasting Post Breakfast Post Lunch Post Supper   7.22 N 88 160 127 162   7.23 N 86 128 136 120   7.24 N 89 123 -- 141   7.25 N 87 141 137 127   7.26 N -- -- 129 136   7.27 N 87 146 121 138   7.28 N 88 136 -- --       Lifestyle and Health Behaviors:       Healthy Coping:       Current Management:       ASSESSMENT:  OB-MFM/Win  Gil is here with her mother today for her follow up for Gestational Diabetes.  Stated she is doing well.  She is not having any swelling in her hands of feet and baby is moving well.      Stated she does feel hungry between her meals.  She is not always eating all her snacks, when she does she is having fruit.  Discussed adding protein to her snacks to help with her hunger.  She will work on this.    Stated her doctors are talking about inducing her at 37 weeks.    Today will be patient's last visit.  Discussed continuing to check glucose 4 times a day and following meal plan until the day of delivery.    After you deliver the baby, it is suggested to check your blood sugar occasionally (1 - 2 times per week) for 6 weeks.   When you are not pregnant, normal blood sugars are:     Fasting (before eating anything in the morning)  - under 100 mg/dl  2 hours after meals under 140  The American Diabetes Association recommends:   a glucose tolerance test 6 weeks after you deliver the baby.      a hemoglobin  Alc test yearly after delivery.  The Alc test is a 2-3 month average blood sugar reading.       Discuss getting these tests with your provider.    After delivery, and your provider has said that it is safe to be active, work toward getting 150 minutes each week of physical activity to decrease your risk of  getting type 2 diabetes.    Maintaining a healthy body weight will also decrease your risk of getting type 2 diabetes.         INTERVENTION:  Educational topics covered today:  What to expect after delivery, Future testing for Type 2 diabetes (2 hour OGTT at 6 week post-partum check-up and annual fasting blood glucose level), Risk of GDM and planning ahead for future pregnancies, Recommended lifestyle interventions for reducing the risk of Type 2 Diabetes, When to Call a Diabetes Educator or OB Provider    Educational Materials provided today:  [place] Preventing Diabetes    PLAN:  Check glucose 4 times daily.  Check ketones once a week when readings are consistently negative.  Continue with recommended physical activity.  Continue to follow recommended meal plan: 15-30 carbs at breakfast, 45-60 carbs at lunch, 45-60 carbs at supper, 15-30 carbs at snacks.  Follow consistent CHO meal plan, eat CHO and protein/fat at all meals/snacks.    Call/e-mail/Xsigohart message diabetes educator if 3 or more blood sugars are above the goal in 1 week or if ketones are positive.     The service provided today was under the supervising provider, Sharmin Ortiz, who was available if needed.     Time Spent: 30 minutes  Encounter Type: Individual    Any diabetes medication dose changes were made via the CDE Protocol and Collaborative Practice Agreement with the patient's OB/GYN provider. A copy of this encounter was shared with the provider.

## 2022-07-28 NOTE — PROGRESS NOTES
Diabetes Self-Management Education & Support    SUBJECTIVE/OBJECTIVE:  Presents for education related to gestational diabetes.         Cultural Influences/Ethnic Background:  Not  or       Wt 80.7 kg (178 lb)   LMP 11/22/2021   BMI 29.62 kg/m          Estimated Date of Delivery: Sep 3, 2022    Blood Glucose/Ketone Log:   Date Ketones Fasting Post Breakfast Post Lunch Post Supper   7.22 N 88 160 127 162   7.23 N 86 128 136 120   7.24 N 89 123 -- 141   7.25 N 87 141 137 127   7.26 N -- -- 129 136   7.27 N 87 146 121 138   7.28 N 88 136 -- --       Lifestyle and Health Behaviors:       Healthy Coping:       Current Management:       ASSESSMENT:  OB-MFM/Win  Gil is here with her mother today for her follow up for Gestational Diabetes.  Stated she is doing well.  She is not having any swelling in her hands of feet and baby is moving well.      Stated she does feel hungry between her meals.  She is not always eating all her snacks, when she does she is having fruit.  Discussed adding protein to her snacks to help with her hunger.  She will work on this.    Stated her doctors are talking about inducing her at 37 weeks.    Today will be patient's last visit.  Discussed continuing to check glucose 4 times a day and following meal plan until the day of delivery.    After you deliver the baby, it is suggested to check your blood sugar occasionally (1 - 2 times per week) for 6 weeks.   When you are not pregnant, normal blood sugars are:     Fasting (before eating anything in the morning)  - under 100 mg/dl  2 hours after meals under 140  The American Diabetes Association recommends:   a glucose tolerance test 6 weeks after you deliver the baby.      a hemoglobin Alc test yearly after delivery.  The Alc test is a 2-3 month average blood sugar reading.       Discuss getting these tests with your provider.    After delivery, and your provider has said that it is safe to be active, work toward getting 150  minutes each week of physical activity to decrease your risk of  getting type 2 diabetes.    Maintaining a healthy body weight will also decrease your risk of getting type 2 diabetes.         INTERVENTION:  Educational topics covered today:  What to expect after delivery, Future testing for Type 2 diabetes (2 hour OGTT at 6 week post-partum check-up and annual fasting blood glucose level), Risk of GDM and planning ahead for future pregnancies, Recommended lifestyle interventions for reducing the risk of Type 2 Diabetes, When to Call a Diabetes Educator or OB Provider    Educational Materials provided today:  [place] Preventing Diabetes    PLAN:  Check glucose 4 times daily.  Check ketones once a week when readings are consistently negative.  Continue with recommended physical activity.  Continue to follow recommended meal plan: 15-30 carbs at breakfast, 45-60 carbs at lunch, 45-60 carbs at supper, 15-30 carbs at snacks.  Follow consistent CHO meal plan, eat CHO and protein/fat at all meals/snacks.    Call/e-mail/MyChart message diabetes educator if 3 or more blood sugars are above the goal in 1 week or if ketones are positive.     The service provided today was under the supervising provider, Sharmin Ortiz, who was available if needed.     Time Spent: 30 minutes  Encounter Type: Individual    Any diabetes medication dose changes were made via the CDE Protocol and Collaborative Practice Agreement with the patient's OB/GYN provider. A copy of this encounter was shared with the provider.

## 2022-07-29 DIAGNOSIS — O36.5990 PREGNANCY AFFECTED BY FETAL GROWTH RESTRICTION: Primary | ICD-10-CM

## 2022-08-02 ENCOUNTER — OFFICE VISIT (OUTPATIENT)
Dept: MATERNAL FETAL MEDICINE | Facility: CLINIC | Age: 32
End: 2022-08-02
Attending: OBSTETRICS & GYNECOLOGY
Payer: COMMERCIAL

## 2022-08-02 ENCOUNTER — HOSPITAL ENCOUNTER (INPATIENT)
Facility: CLINIC | Age: 32
LOS: 3 days | Discharge: HOME HEALTH | End: 2022-08-06
Attending: OBSTETRICS & GYNECOLOGY | Admitting: OBSTETRICS & GYNECOLOGY
Payer: COMMERCIAL

## 2022-08-02 ENCOUNTER — HOSPITAL ENCOUNTER (OUTPATIENT)
Dept: ULTRASOUND IMAGING | Facility: CLINIC | Age: 32
Discharge: HOME | End: 2022-08-02
Attending: OBSTETRICS & GYNECOLOGY
Payer: COMMERCIAL

## 2022-08-02 VITALS
SYSTOLIC BLOOD PRESSURE: 161 MMHG | DIASTOLIC BLOOD PRESSURE: 107 MMHG | RESPIRATION RATE: 16 BRPM | OXYGEN SATURATION: 98 % | HEART RATE: 78 BPM

## 2022-08-02 DIAGNOSIS — R03.0 ELEVATED BLOOD PRESSURE READING WITHOUT DIAGNOSIS OF HYPERTENSION: ICD-10-CM

## 2022-08-02 DIAGNOSIS — O36.5990 PREGNANCY AFFECTED BY FETAL GROWTH RESTRICTION: ICD-10-CM

## 2022-08-02 DIAGNOSIS — Z98.891 S/P CESAREAN SECTION: Primary | ICD-10-CM

## 2022-08-02 DIAGNOSIS — O14.13 PREECLAMPSIA, SEVERE, THIRD TRIMESTER: ICD-10-CM

## 2022-08-02 DIAGNOSIS — O09.93 SUPERVISION OF HIGH RISK PREGNANCY IN THIRD TRIMESTER: Primary | ICD-10-CM

## 2022-08-02 LAB
ABO/RH(D): NORMAL
ALT SERPL W P-5'-P-CCNC: 18 U/L (ref 0–50)
ALT SERPL W P-5'-P-CCNC: 22 U/L (ref 0–50)
ANTIBODY SCREEN: NEGATIVE
AST SERPL W P-5'-P-CCNC: 16 U/L (ref 0–45)
AST SERPL W P-5'-P-CCNC: 22 U/L (ref 0–45)
CREAT SERPL-MCNC: 0.47 MG/DL (ref 0.52–1.04)
CREAT SERPL-MCNC: 0.49 MG/DL (ref 0.52–1.04)
CREAT UR-MCNC: 11 MG/DL
GFR SERPL CREATININE-BSD FRML MDRD: >90 ML/MIN/1.73M2
GFR SERPL CREATININE-BSD FRML MDRD: >90 ML/MIN/1.73M2
GLUCOSE BLDC GLUCOMTR-MCNC: 100 MG/DL (ref 70–99)
GLUCOSE BLDC GLUCOMTR-MCNC: 139 MG/DL (ref 70–99)
GLUCOSE BLDC GLUCOMTR-MCNC: 64 MG/DL (ref 70–99)
GLUCOSE BLDC GLUCOMTR-MCNC: 74 MG/DL (ref 70–99)
HGB BLD-MCNC: 11.7 G/DL (ref 11.7–15.7)
HGB BLD-MCNC: 12.2 G/DL (ref 11.7–15.7)
PLATELET # BLD AUTO: 103 10E3/UL (ref 150–450)
PLATELET # BLD AUTO: 104 10E3/UL (ref 150–450)
PROT UR-MCNC: 0.82 G/L
PROT/CREAT 24H UR: 7.45 G/G CR (ref 0–0.2)
SARS-COV-2 RNA RESP QL NAA+PROBE: NEGATIVE
SPECIMEN EXPIRATION DATE: NORMAL

## 2022-08-02 PROCEDURE — 86780 TREPONEMA PALLIDUM: CPT | Performed by: STUDENT IN AN ORGANIZED HEALTH CARE EDUCATION/TRAINING PROGRAM

## 2022-08-02 PROCEDURE — 76819 FETAL BIOPHYS PROFIL W/O NST: CPT

## 2022-08-02 PROCEDURE — 36415 COLL VENOUS BLD VENIPUNCTURE: CPT | Performed by: STUDENT IN AN ORGANIZED HEALTH CARE EDUCATION/TRAINING PROGRAM

## 2022-08-02 PROCEDURE — 87653 STREP B DNA AMP PROBE: CPT | Performed by: STUDENT IN AN ORGANIZED HEALTH CARE EDUCATION/TRAINING PROGRAM

## 2022-08-02 PROCEDURE — 85018 HEMOGLOBIN: CPT | Performed by: STUDENT IN AN ORGANIZED HEALTH CARE EDUCATION/TRAINING PROGRAM

## 2022-08-02 PROCEDURE — 99222 1ST HOSP IP/OBS MODERATE 55: CPT | Performed by: PEDIATRICS

## 2022-08-02 PROCEDURE — 250N000011 HC RX IP 250 OP 636: Performed by: OBSTETRICS & GYNECOLOGY

## 2022-08-02 PROCEDURE — 82962 GLUCOSE BLOOD TEST: CPT

## 2022-08-02 PROCEDURE — 59025 FETAL NON-STRESS TEST: CPT

## 2022-08-02 PROCEDURE — 99213 OFFICE O/P EST LOW 20 MIN: CPT | Mod: 25 | Performed by: ADVANCED PRACTICE MIDWIFE

## 2022-08-02 PROCEDURE — 82565 ASSAY OF CREATININE: CPT | Performed by: STUDENT IN AN ORGANIZED HEALTH CARE EDUCATION/TRAINING PROGRAM

## 2022-08-02 PROCEDURE — G0463 HOSPITAL OUTPT CLINIC VISIT: HCPCS | Mod: 25

## 2022-08-02 PROCEDURE — U0005 INFEC AGEN DETEC AMPLI PROBE: HCPCS | Performed by: STUDENT IN AN ORGANIZED HEALTH CARE EDUCATION/TRAINING PROGRAM

## 2022-08-02 PROCEDURE — G0463 HOSPITAL OUTPT CLINIC VISIT: HCPCS

## 2022-08-02 PROCEDURE — 76820 UMBILICAL ARTERY ECHO: CPT | Mod: 26 | Performed by: OBSTETRICS & GYNECOLOGY

## 2022-08-02 PROCEDURE — 86850 RBC ANTIBODY SCREEN: CPT | Performed by: STUDENT IN AN ORGANIZED HEALTH CARE EDUCATION/TRAINING PROGRAM

## 2022-08-02 PROCEDURE — 84156 ASSAY OF PROTEIN URINE: CPT | Performed by: STUDENT IN AN ORGANIZED HEALTH CARE EDUCATION/TRAINING PROGRAM

## 2022-08-02 PROCEDURE — 76815 OB US LIMITED FETUS(S): CPT | Mod: 26 | Performed by: OBSTETRICS & GYNECOLOGY

## 2022-08-02 PROCEDURE — 84460 ALANINE AMINO (ALT) (SGPT): CPT | Performed by: STUDENT IN AN ORGANIZED HEALTH CARE EDUCATION/TRAINING PROGRAM

## 2022-08-02 PROCEDURE — 84450 TRANSFERASE (AST) (SGOT): CPT | Performed by: STUDENT IN AN ORGANIZED HEALTH CARE EDUCATION/TRAINING PROGRAM

## 2022-08-02 PROCEDURE — 76818 FETAL BIOPHYS PROFILE W/NST: CPT | Mod: 26 | Performed by: OBSTETRICS & GYNECOLOGY

## 2022-08-02 PROCEDURE — 85049 AUTOMATED PLATELET COUNT: CPT | Performed by: STUDENT IN AN ORGANIZED HEALTH CARE EDUCATION/TRAINING PROGRAM

## 2022-08-02 RX ORDER — DEXTROSE, SODIUM CHLORIDE, SODIUM LACTATE, POTASSIUM CHLORIDE, AND CALCIUM CHLORIDE 5; .6; .31; .03; .02 G/100ML; G/100ML; G/100ML; G/100ML; G/100ML
500 INJECTION, SOLUTION INTRAVENOUS ONCE
Status: COMPLETED | OUTPATIENT
Start: 2022-08-02 | End: 2022-08-03

## 2022-08-02 RX ORDER — HYDRALAZINE HYDROCHLORIDE 20 MG/ML
10 INJECTION INTRAMUSCULAR; INTRAVENOUS
Status: DISCONTINUED | OUTPATIENT
Start: 2022-08-02 | End: 2022-08-06 | Stop reason: HOSPADM

## 2022-08-02 RX ORDER — MAGNESIUM SULFATE HEPTAHYDRATE 40 MG/ML
2 INJECTION, SOLUTION INTRAVENOUS
Status: DISCONTINUED | OUTPATIENT
Start: 2022-08-02 | End: 2022-08-06 | Stop reason: HOSPADM

## 2022-08-02 RX ORDER — LIDOCAINE 40 MG/G
CREAM TOPICAL
Status: DISCONTINUED | OUTPATIENT
Start: 2022-08-02 | End: 2022-08-03 | Stop reason: HOSPADM

## 2022-08-02 RX ORDER — LABETALOL HYDROCHLORIDE 5 MG/ML
20-80 INJECTION, SOLUTION INTRAVENOUS EVERY 10 MIN PRN
Status: DISCONTINUED | OUTPATIENT
Start: 2022-08-02 | End: 2022-08-06 | Stop reason: HOSPADM

## 2022-08-02 RX ORDER — LORAZEPAM 2 MG/ML
2 INJECTION INTRAMUSCULAR
Status: DISCONTINUED | OUTPATIENT
Start: 2022-08-02 | End: 2022-08-06 | Stop reason: HOSPADM

## 2022-08-02 RX ORDER — MAGNESIUM SULFATE HEPTAHYDRATE 500 MG/ML
10 INJECTION, SOLUTION INTRAMUSCULAR; INTRAVENOUS
Status: DISCONTINUED | OUTPATIENT
Start: 2022-08-02 | End: 2022-08-06 | Stop reason: HOSPADM

## 2022-08-02 RX ORDER — MAGNESIUM SULFATE HEPTAHYDRATE 40 MG/ML
4 INJECTION, SOLUTION INTRAVENOUS
Status: DISCONTINUED | OUTPATIENT
Start: 2022-08-02 | End: 2022-08-06 | Stop reason: HOSPADM

## 2022-08-02 RX ADMIN — SODIUM CHLORIDE, SODIUM LACTATE, POTASSIUM CHLORIDE, CALCIUM CHLORIDE AND DEXTROSE MONOHYDRATE 500 ML: 5; 600; 310; 30; 20 INJECTION, SOLUTION INTRAVENOUS at 18:50

## 2022-08-02 ASSESSMENT — ACTIVITIES OF DAILY LIVING (ADL)
FALL_HISTORY_WITHIN_LAST_SIX_MONTHS: NO
HEARING_DIFFICULTY_OR_DEAF: NO
DRESSING/BATHING_DIFFICULTY: NO
TOILETING_ISSUES: NO
DOING_ERRANDS_INDEPENDENTLY_DIFFICULTY: NO
DIFFICULTY_EATING/SWALLOWING: NO
WEAR_GLASSES_OR_BLIND: NO
DIFFICULTY_COMMUNICATING: NO
CONCENTRATING,_REMEMBERING_OR_MAKING_DECISIONS_DIFFICULTY: NO
WALKING_OR_CLIMBING_STAIRS_DIFFICULTY: NO
CHANGE_IN_FUNCTIONAL_STATUS_SINCE_ONSET_OF_CURRENT_ILLNESS/INJURY: NO

## 2022-08-02 NOTE — PROVIDER NOTIFICATION
08/02/22 1637   Provider Notification   Provider Name/Title    Method of Notification At Bedside   Request Evaluate in Person   Notification Reason Status Update    at bedside to discuss lab values and recommendation for delivery, see provider's note. Patient is wanting to wait and reassess labs.  agreed to have labs redrawn 4 hours after previous ones and reassess with patient and  then.

## 2022-08-02 NOTE — PROGRESS NOTES
"Maternal fetal Medicine OB Follow up visit.     Gil Chaudhari  : 1990  MRN: [id number]    CC: OB Follow-up    Subjective:  Gil Chaudhari is a 32 year old  at 35w3d presenting for routine OB follow-up. Today, she is here with her  and reports feeling well. Patient denies regular, painful contractions, denies loss of fluid or vaginal bleeding.  Reports fetal movement. Denies headaches, vision changes, RUQ/epigastric pain.      They have questions about the recommendation for delivery at 37 weeks for FGR. They are wondering if all pregnancies are affected by abnormal dopplers as the placenta ages, and if FGR is more likely related to the fact that the couple themselves are smaller in stature.     OB Hx:  OB History    Para Term  AB Living   3 0 0 0 2 0   SAB IAB Ectopic Multiple Live Births   2 0 0 0 0      # Outcome Date GA Lbr Sha/2nd Weight Sex Delivery Anes PTL Lv   3 Current            2 SAB      SAB      1 SAB      SAB            Objective:  BP (!) 143/82 (BP Location: Left arm, Patient Position: Semi-Arriaga's, Cuff Size: Adult Regular)   Pulse 78   Resp 16   LMP 2021   SpO2 98%   BP: 161/107  Gen: alert, oriented, NAD  Skin: warm, dry, intact  Respiratory: breathing unlabored, no SOB  Abdominal: gravid, non-tender  Pelvic: deferred  Extremities: +1 bilateral edema of the feet and ankles  Neuro: +1 bilateral patellar reflexes, no clonus  Psych: mood WNL, behavior WNL      OB Ultrasound:  Please see \"imaging\" tab under chart review for today's ultrasound results.      Assessment/Plan:  32 year old  at 35w3d here for follow OB visit.    Pregnancy has been complicated by:   Maternal dx:  - Unicornuate uterus  - GDMA1  - Elevated BP    Fetal dx:  - Severe FGR  - Abnormal frontal horns   - Absent CSP  - Breech      Severe FGR:  Abnormal frontal horns:  Absent CSP:  - Discussed rationale for recommendation of delivery at 37 weeks given severe FGR and previously abnormal " dopplers.   - Previously counseled on US findings being suspicious for septo-optic dysplasia. Will need  evaluation.  - Previously given betamethasone at 27 weeks.     Elevated BP:  - Discussed severe range repeat BP and the need for further monitoring of serial BPs and HELLP labs in L&D triage. Patient is agreeable. Charge RN and on-call MFM updated.     Routine PNC:  - Prenatal labs:  Rh: +  antibody: neg   HepB/HIV/RPR: nonreactive   Rubella: non-immune    - Immunizations:  s/p TDap and Covid x3  - GBS negative on  so technically . Consider collecting in triage.        20 minutes spent on the date of the encounter, doing chart review, history and exam, documentation and further activities as noted.      Marleen Ledesma CNM on 2022 at 11:35 AM

## 2022-08-02 NOTE — DISCHARGE SUMMARY
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 150 strip 3 Unknown at Unknown time     blood glucose monitoring (SOFTCLIX) lancets Use to test blood sugar 4 times daily. 100 each 3 Unknown at Unknown time     Docosahexaenoic Acid (DHA) 200 MG capsule Take 200 mg by mouth daily   2022 at Unknown time     Prenatal Vit-Fe Fumarate-FA (PRENATAL MULTIVITAMIN  PLUS IRON) 27-1 MG TABS Take by mouth daily   2022 at Unknown time     Urine Glucose-Ketones Test STRP 1 each by Other route daily 50 strip 3 Unknown at Unknown time     [DISCONTINUED] aspirin (ASA) 81 MG chewable tablet Take 81 mg by mouth daily   2022 at Unknown time     [DISCONTINUED] biotin 1000 MCG TABS tablet Take 1,000 mcg by mouth daily   2022 at Unknown time     [DISCONTINUED] calcium carbonate 600 mg-vitamin D 400 units (CALTRATE) 600-400 MG-UNIT per tablet Take 1 tablet by mouth 2 times daily   Unknown at Unknown time              Discharge Medications:         Review of your medicines      START taking      Dose / Directions   acetaminophen 325 MG tablet  Commonly known as: TYLENOL  Used for: S/P  section      Dose: 650 mg  Take 2 tablets (650 mg) by mouth every 6 hours as needed for mild pain Start after Delivery.  Quantity: 100 tablet  Refills: 0     ibuprofen 600 MG tablet  Commonly known as: ADVIL/MOTRIN  Used for: S/P  section      Dose: 600 mg  Take 1 tablet (600 mg) by mouth every 6 hours as needed for moderate pain Start after delivery  Quantity: 60 tablet  Refills: 0     NIFEdipine ER 60 MG 24 hr tablet  Commonly known as: ADALAT CC  Used for: S/P  section      Dose: 60 mg  Take 1 tablet (60 mg) by mouth daily  Quantity: 40 tablet  Refills: 0     senna-docusate 8.6-50 MG tablet  Commonly known as: SENOKOT-S/PERICOLACE  Used for: S/P  section      Dose: 1 tablet  Take 1 tablet by mouth daily Start after delivery.  Quantity: 100 tablet  Refills: 0        CONTINUE these medicines which have NOT CHANGED      Dose / Directions   Accu-Chek Guide test strip  Used for: Diet controlled gestational diabetes mellitus (GDM) in third trimester  Generic drug: blood glucose      Use to test blood sugar 4 times daily.  Quantity: 150 strip  Refills: 3     blood glucose monitoring lancets  Used for: Diet controlled gestational diabetes mellitus (GDM) in third trimester      Use to test blood sugar 4 times daily.  Quantity: 100 each  Refills: 3     Docosahexaenoic Acid 200 MG capsule  Commonly known as: DHA      Dose: 200 mg  Take 200 mg by mouth daily  Refills: 0     prenatal multivitamin  plus iron 27-1 MG Tabs      Take by mouth daily  Refills: 0     Urine Glucose-Ketones Test Strp  Used for: Diet controlled gestational diabetes mellitus (GDM) in third trimester      Dose: 1 each  1 each by Other route daily  Quantity: 50 strip  Refills: 3        STOP taking    aspirin 81 MG chewable tablet  Commonly known as: ASA        biotin 1000 MCG Tabs  tablet        calcium carbonate 600 mg-vitamin D 400 units 600-400 MG-UNIT per tablet  Commonly known as: CALTRATE              Where to get your medicines      These medications were sent to [facility] [place], [place] - [address]  [address] [facility] [address], [facility] [address]    Phone: [phone]     acetaminophen 325 MG tablet    ibuprofen 600 MG tablet    NIFEdipine ER 60 MG 24 hr tablet    senna-docusate 8.6-50 MG tablet               Consultations:   NICU  Lactation           Brief History of Admission and Antepartum Course:   Gil Chaudhari is a 32 year old  at 35w3d by 11w0d US who presents from the [facility] for Pre-eclampsia workup following a severe range BP. Her pregnancy is complicated by severe FGR <1%, GDMA1, septo-optic dysplasia, unicornuate uterus and uterine fibroids. She received her prenatal care previously from Ike Jones in [place] with fetal surveillance at [facility]. She recently transferred care to the [facility] clinic following recommendations to deliver at [facility].      She had a prior hospitalization on 2022 for fetal decelerations and severe FGR and was given one course of betamethasone at a GA of 27 weeks. Per chart review, she also had an ED visit on 2022 for first trimester bleeding and subchorionic hemorrhage as seen on US. Additionally, she had one other blood pressure reading during pregnancy documented >140/90 after 20 weeks gestation that was not sustained at the time.     On presentation she denies any symptoms including RUQ pain, SOB, headache, vision changes, LOF, VB, +FM, no contractions. HELLP labs were drawn and returned with a UPC of 7.45 and Plts of 103 (down from 154 on ). She was counseled on new diagnosis and recommendations to deliver. She and her  were surprised by new diagnosis and recommendations and after shared decision making agreed to repeat labs in 4 hours. Repeat labs showed Plts of 104, 102 on  . NICU consulted on  due to new findings with recommendations for delivery and discussed care of baby with the patient and .    Overnight on - her platelets continued to be stable from 102-104. Her blood pressures were mildly elevated with one normal range. Follow-up HELLP labs on 8/3 @0900 showed Platelets of 93, patient was counseled on recommendations for delivery. Consulted anesthesia regarding spinal vs. General due to platelets. Patient was a candidate for spinal at this time, which was performed for her .         Intraoperative Course:   The procedure was uncomplicated.  EBL  508 mL.  See operative report for details.      Findings:   - Viable female infant delivered breech  - APGARs 7 and 9. 1720g   - Cord gasses: pH arterial 7.27, BE -2.1  - Clear amniotic fluid, Placenta with large clot noted, otherwise 3 vessel cord  - Unicornuate uterus with normal left ovary and fallopian tube. Rudimentary horn on right with fallopian tube and ovary present  - Four pedunculated fibroids noted on left lateral of uterus, largest being approximately 4cm.    - Surgical sites noted to be hemostatic at the end of case.       Postpartum Hospital Course:   The patient's postpartum course was unremarkable.  She was continued on IV magnesium until 24 hours postpartum.  Her blood pressure medications were uptitrated to 60 mg nifedipine daily, with appropriate blood pressure control.  For her unicornuate uterus, she had a renal ultrasound which was normal aside from right mild hydronephrosis.    On discharge, her pain was well controlled. Vaginal bleeding is similar to peak menstrual flow.  Voiding without difficulty.  Ambulating well and tolerating a normal diet.  No fever.  Breastfeeding well.  Infant is stable.  She was discharged on post-partum day #2.    Post-partum hemoglobin: 11.4    Contraception: Undecided, possible LARC at postpartum visit    Rhogam was not indicated          Discharge  Instructions and Follow-Up:     Discharge diet: Regular   Discharge activity: No lifting greater than 20 lbs, pushing, pulling, or other strenuous activity for 6 weeks. Pelvic rest for 6 weeks including no sexual intercourse, tampons, or douching. No driving until you can slam on the brakes without pain or while on narcotic pain medications.    Discharge follow-up: Follow up with primary OB for BP check in 1 week and routine postpartum visit in 6 weeks   Wound care: Keep incision clean and dry            Discharge Disposition:   Discharged to home    Jo-Ann De La Torre MD  OB/GYN, PGY-3  08/06/2022, 7:32 AM       Appreciate note by Dr. De La Torre. Patient has been seen and examined by me separate from the resident, agree with above note.     Sudha Rosales MD  11:56 AM

## 2022-08-02 NOTE — H&P
Antepartum History & Physical  2022  Gil Chaudhari  [id number]      HPI: Gil Chaudhari is a 32 year old  at 35w3d by 11w0d US who presents from the [facility] for Pre-eclampsia workup following a severe range BP. Her pregnancy is complicated by severe FGR <1%, GDMA1, septo-optic dysplasia, unicornuate uterus and uterine fibroids. She received her prenatal care previously from Margaritaelsie Jones in [place] with fetal surveillance at [facility]. 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     She had a prior hospitalization on 2022 for fetal decelerations and severe FGR and was given one course of betamethasone at a GA of 27 weeks. Per chart review, she also had an ED visit on 2022 for first trimester bleeding and subchorionic hemorrhage as seen on US. Additionally, she had one other blood pressure reading during pregnancy documented >140/90 after 20 weeks gestation that was not sustained at the time.No history of chronic hypertension.     She states that she is feeling well today.  She denies headache, vision changes, chest pain, shortness of breath, fever, chills, nausea, RUQ pain, vomiting or other systemic complaints. She denies vaginal bleeding or loss of fluid and is feeling normal fetal movement. She has had [place] during this pregnancy but no contractions today that are painful or frequent. She did eat a meal today high in sodium prior to her outpatient visit.    ROS: No headaches, vision changes, nausea, vomiting, fevers, chills, chest pain, SOB, abdominal pain, constipation, diarrhea, dysuria, changes in vaginal discharge or edema in extremities noted.     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 No pertinent surgical history.    Medications:   No current facility-administered medications on file prior to encounter.  aspirin (ASA) 81 MG chewable tablet, Take 81 mg by mouth daily  biotin 1000 MCG TABS tablet, Take 1,000 mcg by mouth daily  blood glucose (ACCU-CHEK GUIDE) test strip, Use to test blood sugar 4 times daily.  blood glucose monitoring (SOFTCLIX) lancets, Use to test blood sugar 4 times daily.  calcium carbonate 600 mg-vitamin D 400 units (CALTRATE) 600-400 MG-UNIT per tablet, Take 1 tablet by mouth 2 times daily  Docosahexaenoic Acid (DHA) 200 MG capsule, Take 200 mg by mouth daily  Prenatal Vit-Fe Fumarate-FA (PRENATAL MULTIVITAMIN  PLUS IRON) 27-1 MG TABS, Take by mouth daily  Urine Glucose-Ketones Test STRP, 1 each by Other route daily        Allergies:  No Known Allergies    FamilyHX:  History reviewed. No pertinent family history.    SocialHX:   Social History     Socioeconomic History     Marital status:      Spouse name: None     Number of children: None     Years of education: None     Highest education level: None   Tobacco Use     Smoking status: Never Smoker     Smokeless tobacco: Never Used   Substance and Sexual Activity     Alcohol use: Never     Drug use: Never     Sexual activity: Yes     Partners: Male       ROS: 10-point ROS negative except as indicated in HPI.    Physical Exam:  Vitals:    22 1552 22 1607 22 1622 22 1637   BP: (!) 143/94 (!) 133/94 (!) 142/95 (!) 143/99   BP Location:       Patient  Position:       Cuff Size:       Resp:  16     Temp:       TempSrc:         General: alert, oriented female, resting in bed in NAD  CV: regular rate and rhythm, normal s1 and s2, no murmurs  Lungs: normal work of breathing, normal chest rise  Abdomen: soft, gravid, non-tender, EFW (1422g per 7/15 US) <1%  Extremities: bilateral lower extremities non-tender with 1+ edema  Neuro exam: Normoactive reflexes, mental status intact, no ankle clonus  Psych: Appropriate mood and affect      Presentation: justyn breech with posterior placenta by 8/2/2022 US. BPP 8/10.     FHT: baseline 130, moderate variability, +accelerations, no decelerations  [place]oskins: 1 Ctx in 15 minutes    Prenatal ultrasounds:  8/2/2022 [facility] US:   BPP 8/10  Normal UA dopplers    7/28/2022 [facility] US:   1) Gray intrauterine pregnancy at 34w 5d gestational age.  2) The NST is reactive.  3) The amniotic fluid volume appeared normal.  4) The UA Doppler demonstrates increased resistance to flow.    Prenatal Labs:      Lab Results   Component Value Date    AS Negative 06/22/2022    HGB 12.2 08/02/2022       GBS Status:   No results found for: GBS     No available results found for: PAP    Labs:   Results for orders placed or performed during the hospital encounter of 08/02/22 (from the past 24 hour(s))   Protein  random urine   Result Value Ref Range    Total Protein Random Urine g/L 0.82 g/L    Total Protein Urine g/gr Creatinine 7.45 (H) 0.00 - 0.20 g/g Cr    Creatinine Urine mg/dL 11 mg/dL   Hemoglobin   Result Value Ref Range    Hemoglobin 12.2 11.7 - 15.7 g/dL   Platelet count   Result Value Ref Range    Platelet Count 103 (L) 150 - 450 10e3/uL   AST   Result Value Ref Range    AST 22 0 - 45 U/L   ALT   Result Value Ref Range    ALT 22 0 - 50 U/L   Creatinine   Result Value Ref Range    Creatinine 0.47 (L) 0.52 - 1.04 mg/dL    GFR Estimate >90 >60 mL/min/1.73m2       Imaging:   [facility] US on 8/2/2022 pending, will follow-up with results. BPP 8/8  Presentation  breech with posterior placenta, normal UA dopplers.       Assessment: Gil Chaudhari is a 32 year old  at 35w3d by 11w0d US who presents from the [facility] for Pre-eclampsia workup following a severe range BPs. Her pregnancy is complicated by severe FGR <1%, GDMA1, septo-optic dysplasia, unicornuate uterus and uterine fibroids.     Diagnosis based on on new onset HTN, proteinuria and platelets of 103 is pre-eclampsia, possibly with severe feature of thrombocytopenia as platelets approach 100. She currently has no evidence of sustained severe HTN and she is asymptomatic at > 34 weeks.     Patient's UPC came back elevated at 7.45 and Plts decreased at 103. Discussed results with patient and new diagnosis of Pre-E w/SF. Patient's BP continues to be mildly elevated >140/90. She continues to be asymptomatic. Discussed recommendations to proceed with delivery via  at this time due to breech presentation.  Patient's questions were answered and she was counseled on the mechanism of PreE w/SF and risks to maternal health including possible progression to Eclampsia. Discussed maternal and fetal complications including stroke, spontaneous bleeding, fetal distress and IUFD, among others.  Patient and  explained they were surprised with the new diagnosis of Pre-Eclampsia and delivery recommendations as they were hoping to deliver at 37 weeks as previously planned and initially expressed desire to prolong pregnancy until 37 weeks for fetal benefit. We discussed the risks of patient's safety in waiting until that time including our concern that she may progress to eclampsia, that she might have a seizure or stroke, that she might become coagulopathic making delivery more complex and that she might need general anesthesia if her platelets drop further, and that it might place her baby's life at risk. They are just processing this information, they understand my recommendation for delivery, but are not yet  ready to proceed with this plan.   With shared decision making, decided to redraw labs 4 hrs from previous collection and will discuss repeat lab results and revisit discussion for delivery recommendations at that time. They are aware that if the platelet level drops below 100, it is my strong recommendation that we proceed with C/S - baby is breech. They would like to discuss our recommendations together and with her mother, who will come up to visit. They are aware that our night OB team will review the lab results with them when they are available, will be drawn around 7 pm.     My recommend is delivery due to preeclampsia with severe features based on platelets, especially if her labs continue to worsen when repeated in four hours. They are aware of this recommendation.     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     If they opt not to move forward with delivery after the lab results, they are aware that I strongly recommend that they do not leave the hospital and that serial labs be done every 4-6 hours and that they continue to reconsider delivery if there is evidence of worsening maternal status.     Plan:    # Pre-Eclampsia w/Severe Features  Patient had BPs of 161/107 with repeat and 145/82 this afternoon @ ~1100 currently asymptomatic and without severe features. She has one prior BP >140/90 after 20w GA meeting criteria with new lab findings of Pre-E w/SF. Patient is asymptomatic and physical exam with normal findings at this time.  - HELLP labs: Cr 0.47, AST 22, ALT 22, Hgb 12.2, UPC 7.45, Plts 103  - Repeat HELLP labs @ 1900  - Serial BP monitoring  - Will consent for  after she considers our recommendations.     #GDMA1  - Currently well controlled with diet  - Fasting BG in AM and 1 hour pp (when eating)    #Fetal well-being  - Reactive and reassuring NST  - BPP 8/10 today in  clinic  - Breech presentation per  US  - BMZ course at 27w - would not repeat after 34 weeks.   - Continuous fetal monitoring   - NICU consult appreciated  - pediatrics aware of septo-optic dysplasia    #PNC  - Rh +, antibody -  - HepB/HIV/RPR non-reactive  - Rubella nonimmune  - S/p Tdap, COVIDx3  - GBS unknown, collected     #uterine anomaly  - recommend  renal US of patient given association of renal abnormalities with uterine anomaly - it does not appear that this has happened in the past based on our chart review.     Delivery Plan:  As patient is >34w GA with new dx of Pre-Eclampsia w/Severe Features counseled patient for recommendations for  delivery at this time. With shared decision making, decided to redraw labs 4 hrs from previous collection and will discuss repeat lab results and revisit discussion for delivery recommendations at that time.      Patient seen and care plan discussed under supervision of Dr. Roberson, Dr. Robert Byrd, MS4  [facility] Medical Student    Physician Attestation   I, Vandana Jones MD, was present with the medical/NANDA student who participated in the service and in the documentation of the note.  I have verified the history and personally performed the physical exam and medical decision making.  I agree with the assessment and plan of care as documented in the note.      I personally reviewed vital signs, medications, labs, imaging and and performed her exam and reviewed the recommendations for care and my concerns if delivery is not facilitated shortly. .    This patient has hypertension, proteinuria and platelets which are low, concerning for preeclampsia with severe features > 34 weeks and with prior betamethasone administration.     Vandana Jones MD  Date of Service (when I saw the patient): 22

## 2022-08-02 NOTE — PROVIDER NOTIFICATION
22 1315   Provider Notification   Provider Name/Title  and Dr Roberson   Method of Notification In Department   Request Evaluate in Person   Notification Reason Patient Arrived     Patient arrived from [facility] clinic for pre-e eval. Patient denies headache, vision changes, epigastric pain. . 35w3d. First BP was 141/87. Reflexes hypo, no clonus.

## 2022-08-02 NOTE — NURSING NOTE
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 BP elevated today, first elevated BP per pt. Severe elevation on repeat. Pt reports positive fetal movement, see flowsheet. Pt denies bldg/lof/change in discharge/contractions/headache/vision changes/chest pain/SOB/edema. Dr. Miller/Marleen Ledesma CNM met with pt and discussed POC. Folder/callback precautions reviewed. Pt escorted ambulatory to L&D for evaluation for pre-eclampsia. Prenatal records faxed to [facility] for review.

## 2022-08-02 NOTE — PROVIDER NOTIFICATION
08/02/22 1803   Provider Notification   Provider Name/Title    Method of Notification In Department   Request Evaluate - Remote   Notification Reason Lab/Diagnostic Study   Patient's BG was 64. Provider okayed some oral apple juice. Will recheck in 15 mins.

## 2022-08-02 NOTE — CONSULTS
[facility]                Neonatology Antepartum Counseling Consult:  I was asked to provide antepartum counseling for Gil Chaudhari at the request of Vandana Jones MD secondary to . Ms. 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 Given the severe growth restriction and possible septo optic dysplasia, there is a high likelihood that their baby will need NICU admission and consultation with sub-specialists.     Patient Active Problem List   Diagnosis     Encounter for triage in pregnant patient     Diabetes (H)     Pregnancy affected by fetal growth restriction      Ms. 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     I also explained the basic four criteria for discharge: that the baby had to be able to maintain their body temperature; able to feed by bottle or breast well enough to attain an adequate pattern of weight gain and growth, and be able to be free of respiratory support.     The patient had no remaining questions but was encouraged to contact the NICU via their caregivers should any arise.  Please feel free to call and thank you for involving the NICU team in the care of your patient.      Floor Time (min): 45  Face to Face Time (min): 25  Total Time (minutes): 60  More than 50% of my time was spent in direct, face to face, antepartum counseling with the above patient.    Cristofer Snyder MD

## 2022-08-03 ENCOUNTER — ANESTHESIA (OUTPATIENT)
Dept: OBGYN | Facility: CLINIC | Age: 32
End: 2022-08-03
Payer: COMMERCIAL

## 2022-08-03 ENCOUNTER — ANESTHESIA EVENT (OUTPATIENT)
Dept: OBGYN | Facility: CLINIC | Age: 32
End: 2022-08-03
Payer: COMMERCIAL

## 2022-08-03 ENCOUNTER — PREP FOR PROCEDURE (OUTPATIENT)
Dept: MATERNAL FETAL MEDICINE | Facility: CLINIC | Age: 32
End: 2022-08-03

## 2022-08-03 PROBLEM — O14.13 PREECLAMPSIA, SEVERE, THIRD TRIMESTER: Status: ACTIVE | Noted: 2022-08-03

## 2022-08-03 LAB
ALT SERPL W P-5'-P-CCNC: 17 U/L (ref 0–50)
ALT SERPL W P-5'-P-CCNC: 25 U/L (ref 0–50)
AST SERPL W P-5'-P-CCNC: 15 U/L (ref 0–45)
AST SERPL W P-5'-P-CCNC: 27 U/L (ref 0–45)
CREAT SERPL-MCNC: 0.45 MG/DL (ref 0.52–1.04)
CREAT SERPL-MCNC: 0.49 MG/DL (ref 0.52–1.04)
ERYTHROCYTE [DISTWIDTH] IN BLOOD BY AUTOMATED COUNT: 14.3 % (ref 10–15)
ERYTHROCYTE [DISTWIDTH] IN BLOOD BY AUTOMATED COUNT: 14.3 % (ref 10–15)
GFR SERPL CREATININE-BSD FRML MDRD: >90 ML/MIN/1.73M2
GFR SERPL CREATININE-BSD FRML MDRD: >90 ML/MIN/1.73M2
GLUCOSE BLDC GLUCOMTR-MCNC: 77 MG/DL (ref 70–99)
GLUCOSE BLDC GLUCOMTR-MCNC: 78 MG/DL (ref 70–99)
GLUCOSE BLDC GLUCOMTR-MCNC: 81 MG/DL (ref 70–99)
GP B STREP DNA SPEC QL NAA+PROBE: NEGATIVE
HCT VFR BLD AUTO: 32.4 % (ref 35–47)
HCT VFR BLD AUTO: 32.9 % (ref 35–47)
HGB BLD-MCNC: 11.4 G/DL (ref 11.7–15.7)
HGB BLD-MCNC: 11.4 G/DL (ref 11.7–15.7)
MAGNESIUM SERPL-MCNC: 4.2 MG/DL (ref 1.6–2.3)
MAGNESIUM SERPL-MCNC: 5.4 MG/DL (ref 1.6–2.3)
MCH RBC QN AUTO: 32.8 PG (ref 26.5–33)
MCH RBC QN AUTO: 33.2 PG (ref 26.5–33)
MCHC RBC AUTO-ENTMCNC: 34.7 G/DL (ref 31.5–36.5)
MCHC RBC AUTO-ENTMCNC: 35.2 G/DL (ref 31.5–36.5)
MCV RBC AUTO: 95 FL (ref 78–100)
MCV RBC AUTO: 95 FL (ref 78–100)
PLATELET # BLD AUTO: 102 10E3/UL (ref 150–450)
PLATELET # BLD AUTO: 93 10E3/UL (ref 150–450)
RBC # BLD AUTO: 3.43 10E6/UL (ref 3.8–5.2)
RBC # BLD AUTO: 3.48 10E6/UL (ref 3.8–5.2)
T PALLIDUM AB SER QL: NONREACTIVE
WBC # BLD AUTO: 4.5 10E3/UL (ref 4–11)
WBC # BLD AUTO: 5.1 10E3/UL (ref 4–11)

## 2022-08-03 PROCEDURE — 250N000011 HC RX IP 250 OP 636: Performed by: STUDENT IN AN ORGANIZED HEALTH CARE EDUCATION/TRAINING PROGRAM

## 2022-08-03 PROCEDURE — 85027 COMPLETE CBC AUTOMATED: CPT | Performed by: STUDENT IN AN ORGANIZED HEALTH CARE EDUCATION/TRAINING PROGRAM

## 2022-08-03 PROCEDURE — 258N000003 HC RX IP 258 OP 636: Performed by: OBSTETRICS & GYNECOLOGY

## 2022-08-03 PROCEDURE — 250N000013 HC RX MED GY IP 250 OP 250 PS 637: Performed by: STUDENT IN AN ORGANIZED HEALTH CARE EDUCATION/TRAINING PROGRAM

## 2022-08-03 PROCEDURE — 250N000011 HC RX IP 250 OP 636

## 2022-08-03 PROCEDURE — 88307 TISSUE EXAM BY PATHOLOGIST: CPT | Mod: 26 | Performed by: PATHOLOGY

## 2022-08-03 PROCEDURE — 59514 CESAREAN DELIVERY ONLY: CPT | Mod: GC | Performed by: OBSTETRICS & GYNECOLOGY

## 2022-08-03 PROCEDURE — 360N000076 HC SURGERY LEVEL 3, PER MIN: Performed by: OBSTETRICS & GYNECOLOGY

## 2022-08-03 PROCEDURE — 250N000011 HC RX IP 250 OP 636: Performed by: OBSTETRICS & GYNECOLOGY

## 2022-08-03 PROCEDURE — 84460 ALANINE AMINO (ALT) (SGPT): CPT | Performed by: STUDENT IN AN ORGANIZED HEALTH CARE EDUCATION/TRAINING PROGRAM

## 2022-08-03 PROCEDURE — 83735 ASSAY OF MAGNESIUM: CPT | Performed by: STUDENT IN AN ORGANIZED HEALTH CARE EDUCATION/TRAINING PROGRAM

## 2022-08-03 PROCEDURE — 36415 COLL VENOUS BLD VENIPUNCTURE: CPT

## 2022-08-03 PROCEDURE — 370N000017 HC ANESTHESIA TECHNICAL FEE, PER MIN: Performed by: OBSTETRICS & GYNECOLOGY

## 2022-08-03 PROCEDURE — 250N000009 HC RX 250: Performed by: NURSE ANESTHETIST, CERTIFIED REGISTERED

## 2022-08-03 PROCEDURE — 120N000002 HC R&B MED SURG/OB UMMC

## 2022-08-03 PROCEDURE — 82565 ASSAY OF CREATININE: CPT | Performed by: STUDENT IN AN ORGANIZED HEALTH CARE EDUCATION/TRAINING PROGRAM

## 2022-08-03 PROCEDURE — 83735 ASSAY OF MAGNESIUM: CPT

## 2022-08-03 PROCEDURE — 258N000003 HC RX IP 258 OP 636: Performed by: NURSE ANESTHETIST, CERTIFIED REGISTERED

## 2022-08-03 PROCEDURE — 271N000001 HC OR GENERAL SUPPLY NON-STERILE: Performed by: OBSTETRICS & GYNECOLOGY

## 2022-08-03 PROCEDURE — 710N000010 HC RECOVERY PHASE 1, LEVEL 2, PER MIN: Performed by: OBSTETRICS & GYNECOLOGY

## 2022-08-03 PROCEDURE — 84450 TRANSFERASE (AST) (SGOT): CPT | Performed by: STUDENT IN AN ORGANIZED HEALTH CARE EDUCATION/TRAINING PROGRAM

## 2022-08-03 PROCEDURE — 250N000011 HC RX IP 250 OP 636: Performed by: ANESTHESIOLOGY

## 2022-08-03 PROCEDURE — 250N000013 HC RX MED GY IP 250 OP 250 PS 637

## 2022-08-03 PROCEDURE — 250N000011 HC RX IP 250 OP 636: Performed by: NURSE ANESTHETIST, CERTIFIED REGISTERED

## 2022-08-03 PROCEDURE — 88307 TISSUE EXAM BY PATHOLOGIST: CPT | Mod: TC

## 2022-08-03 PROCEDURE — 272N000001 HC OR GENERAL SUPPLY STERILE: Performed by: OBSTETRICS & GYNECOLOGY

## 2022-08-03 PROCEDURE — 82962 GLUCOSE BLOOD TEST: CPT

## 2022-08-03 PROCEDURE — 999N000141 HC STATISTIC PRE-PROCEDURE NURSING ASSESSMENT: Performed by: OBSTETRICS & GYNECOLOGY

## 2022-08-03 PROCEDURE — 36415 COLL VENOUS BLD VENIPUNCTURE: CPT | Performed by: STUDENT IN AN ORGANIZED HEALTH CARE EDUCATION/TRAINING PROGRAM

## 2022-08-03 RX ORDER — MORPHINE SULFATE 0.5 MG/ML
150 INJECTION, SOLUTION EPIDURAL; INTRATHECAL; INTRAVENOUS ONCE
Status: DISCONTINUED | OUTPATIENT
Start: 2022-08-03 | End: 2022-08-03 | Stop reason: HOSPADM

## 2022-08-03 RX ORDER — METOCLOPRAMIDE HYDROCHLORIDE 5 MG/ML
10 INJECTION INTRAMUSCULAR; INTRAVENOUS EVERY 6 HOURS
Status: DISCONTINUED | OUTPATIENT
Start: 2022-08-03 | End: 2022-08-06 | Stop reason: HOSPADM

## 2022-08-03 RX ORDER — CEFAZOLIN SODIUM 2 G/100ML
2 INJECTION, SOLUTION INTRAVENOUS
Status: CANCELLED | OUTPATIENT
Start: 2022-08-03

## 2022-08-03 RX ORDER — MAGNESIUM SULFATE HEPTAHYDRATE 40 MG/ML
2 INJECTION, SOLUTION INTRAVENOUS
Status: DISCONTINUED | OUTPATIENT
Start: 2022-08-03 | End: 2022-08-06 | Stop reason: HOSPADM

## 2022-08-03 RX ORDER — NALOXONE HYDROCHLORIDE 0.4 MG/ML
0.2 INJECTION, SOLUTION INTRAMUSCULAR; INTRAVENOUS; SUBCUTANEOUS
Status: DISCONTINUED | OUTPATIENT
Start: 2022-08-03 | End: 2022-08-06 | Stop reason: HOSPADM

## 2022-08-03 RX ORDER — DEXTROSE, SODIUM CHLORIDE, SODIUM LACTATE, POTASSIUM CHLORIDE, AND CALCIUM CHLORIDE 5; .6; .31; .03; .02 G/100ML; G/100ML; G/100ML; G/100ML; G/100ML
INJECTION, SOLUTION INTRAVENOUS CONTINUOUS
Status: DISCONTINUED | OUTPATIENT
Start: 2022-08-03 | End: 2022-08-06 | Stop reason: HOSPADM

## 2022-08-03 RX ORDER — FENTANYL CITRATE 50 UG/ML
INJECTION, SOLUTION INTRAMUSCULAR; INTRAVENOUS
Status: COMPLETED | OUTPATIENT
Start: 2022-08-03 | End: 2022-08-03

## 2022-08-03 RX ORDER — OXYCODONE HYDROCHLORIDE 5 MG/1
5 TABLET ORAL EVERY 4 HOURS PRN
Status: DISCONTINUED | OUTPATIENT
Start: 2022-08-03 | End: 2022-08-06 | Stop reason: HOSPADM

## 2022-08-03 RX ORDER — METOCLOPRAMIDE HYDROCHLORIDE 5 MG/ML
10 INJECTION INTRAMUSCULAR; INTRAVENOUS EVERY 6 HOURS PRN
Status: DISCONTINUED | OUTPATIENT
Start: 2022-08-03 | End: 2022-08-06 | Stop reason: HOSPADM

## 2022-08-03 RX ORDER — CITRIC ACID/SODIUM CITRATE 334-500MG
30 SOLUTION, ORAL ORAL
Status: CANCELLED | OUTPATIENT
Start: 2022-08-03

## 2022-08-03 RX ORDER — PROCHLORPERAZINE 25 MG
25 SUPPOSITORY, RECTAL RECTAL EVERY 12 HOURS PRN
Status: DISCONTINUED | OUTPATIENT
Start: 2022-08-03 | End: 2022-08-06 | Stop reason: HOSPADM

## 2022-08-03 RX ORDER — NICOTINE POLACRILEX 4 MG
LOZENGE BUCCAL
Status: DISCONTINUED
Start: 2022-08-03 | End: 2022-08-03 | Stop reason: WASHOUT

## 2022-08-03 RX ORDER — AMOXICILLIN 250 MG
1 CAPSULE ORAL 2 TIMES DAILY
Status: DISCONTINUED | OUTPATIENT
Start: 2022-08-03 | End: 2022-08-06 | Stop reason: HOSPADM

## 2022-08-03 RX ORDER — EPINEPHRINE 1 MG/ML
INJECTION, SOLUTION, CONCENTRATE INTRAVENOUS
Status: COMPLETED | OUTPATIENT
Start: 2022-08-03 | End: 2022-08-03

## 2022-08-03 RX ORDER — MISOPROSTOL 200 UG/1
800 TABLET ORAL
Status: CANCELLED | OUTPATIENT
Start: 2022-08-03

## 2022-08-03 RX ORDER — OXYTOCIN/0.9 % SODIUM CHLORIDE 30/500 ML
340 PLASTIC BAG, INJECTION (ML) INTRAVENOUS CONTINUOUS PRN
Status: DISCONTINUED | OUTPATIENT
Start: 2022-08-03 | End: 2022-08-06 | Stop reason: HOSPADM

## 2022-08-03 RX ORDER — MORPHINE SULFATE 1 MG/ML
INJECTION, SOLUTION EPIDURAL; INTRATHECAL; INTRAVENOUS PRN
Status: DISCONTINUED | OUTPATIENT
Start: 2022-08-03 | End: 2022-08-03

## 2022-08-03 RX ORDER — LORAZEPAM 2 MG/ML
2 INJECTION INTRAMUSCULAR
Status: DISCONTINUED | OUTPATIENT
Start: 2022-08-03 | End: 2022-08-06 | Stop reason: HOSPADM

## 2022-08-03 RX ORDER — CALCIUM GLUCONATE 94 MG/ML
1 INJECTION, SOLUTION INTRAVENOUS
Status: DISCONTINUED | OUTPATIENT
Start: 2022-08-03 | End: 2022-08-06 | Stop reason: HOSPADM

## 2022-08-03 RX ORDER — FENTANYL CITRATE 50 UG/ML
INJECTION, SOLUTION INTRAMUSCULAR; INTRAVENOUS PRN
Status: DISCONTINUED | OUTPATIENT
Start: 2022-08-03 | End: 2022-08-03

## 2022-08-03 RX ORDER — TRANEXAMIC ACID 10 MG/ML
1 INJECTION, SOLUTION INTRAVENOUS EVERY 30 MIN PRN
Status: DISCONTINUED | OUTPATIENT
Start: 2022-08-03 | End: 2022-08-06 | Stop reason: HOSPADM

## 2022-08-03 RX ORDER — ONDANSETRON 2 MG/ML
INJECTION INTRAMUSCULAR; INTRAVENOUS PRN
Status: DISCONTINUED | OUTPATIENT
Start: 2022-08-03 | End: 2022-08-03

## 2022-08-03 RX ORDER — PROCHLORPERAZINE MALEATE 10 MG
10 TABLET ORAL EVERY 6 HOURS PRN
Status: DISCONTINUED | OUTPATIENT
Start: 2022-08-03 | End: 2022-08-06 | Stop reason: HOSPADM

## 2022-08-03 RX ORDER — ONDANSETRON 4 MG/1
4 TABLET, ORALLY DISINTEGRATING ORAL EVERY 6 HOURS PRN
Status: DISCONTINUED | OUTPATIENT
Start: 2022-08-03 | End: 2022-08-06 | Stop reason: HOSPADM

## 2022-08-03 RX ORDER — BISACODYL 10 MG
10 SUPPOSITORY, RECTAL RECTAL DAILY PRN
Status: DISCONTINUED | OUTPATIENT
Start: 2022-08-05 | End: 2022-08-06 | Stop reason: HOSPADM

## 2022-08-03 RX ORDER — MAGNESIUM SULFATE HEPTAHYDRATE 40 MG/ML
4 INJECTION, SOLUTION INTRAVENOUS
Status: DISCONTINUED | OUTPATIENT
Start: 2022-08-03 | End: 2022-08-06 | Stop reason: HOSPADM

## 2022-08-03 RX ORDER — MORPHINE SULFATE 1 MG/ML
INJECTION, SOLUTION EPIDURAL; INTRATHECAL; INTRAVENOUS
Status: COMPLETED | OUTPATIENT
Start: 2022-08-03 | End: 2022-08-03

## 2022-08-03 RX ORDER — MODIFIED LANOLIN
OINTMENT (GRAM) TOPICAL
Status: DISCONTINUED | OUTPATIENT
Start: 2022-08-03 | End: 2022-08-06 | Stop reason: HOSPADM

## 2022-08-03 RX ORDER — OXYTOCIN 10 [USP'U]/ML
10 INJECTION, SOLUTION INTRAMUSCULAR; INTRAVENOUS
Status: CANCELLED | OUTPATIENT
Start: 2022-08-03

## 2022-08-03 RX ORDER — DIPHENHYDRAMINE HCL 25 MG
25 CAPSULE ORAL EVERY 6 HOURS PRN
Status: DISCONTINUED | OUTPATIENT
Start: 2022-08-03 | End: 2022-08-06 | Stop reason: HOSPADM

## 2022-08-03 RX ORDER — HYDROCORTISONE 25 MG/G
CREAM TOPICAL 3 TIMES DAILY PRN
Status: DISCONTINUED | OUTPATIENT
Start: 2022-08-03 | End: 2022-08-06 | Stop reason: HOSPADM

## 2022-08-03 RX ORDER — MAGNESIUM SULFATE HEPTAHYDRATE 40 MG/ML
4 INJECTION, SOLUTION INTRAVENOUS ONCE
Status: COMPLETED | OUTPATIENT
Start: 2022-08-03 | End: 2022-08-03

## 2022-08-03 RX ORDER — SIMETHICONE 80 MG
80 TABLET,CHEWABLE ORAL 4 TIMES DAILY PRN
Status: DISCONTINUED | OUTPATIENT
Start: 2022-08-03 | End: 2022-08-06 | Stop reason: HOSPADM

## 2022-08-03 RX ORDER — LIDOCAINE 40 MG/G
CREAM TOPICAL
Status: DISCONTINUED | OUTPATIENT
Start: 2022-08-03 | End: 2022-08-06 | Stop reason: HOSPADM

## 2022-08-03 RX ORDER — CITRIC ACID/SODIUM CITRATE 334-500MG
30 SOLUTION, ORAL ORAL ONCE
Status: COMPLETED | OUTPATIENT
Start: 2022-08-03 | End: 2022-08-03

## 2022-08-03 RX ORDER — MISOPROSTOL 200 UG/1
400 TABLET ORAL
Status: CANCELLED | OUTPATIENT
Start: 2022-08-03

## 2022-08-03 RX ORDER — METOCLOPRAMIDE 10 MG/1
10 TABLET ORAL EVERY 6 HOURS PRN
Status: DISCONTINUED | OUTPATIENT
Start: 2022-08-03 | End: 2022-08-06 | Stop reason: HOSPADM

## 2022-08-03 RX ORDER — SODIUM CHLORIDE, SODIUM LACTATE, POTASSIUM CHLORIDE, CALCIUM CHLORIDE 600; 310; 30; 20 MG/100ML; MG/100ML; MG/100ML; MG/100ML
INJECTION, SOLUTION INTRAVENOUS CONTINUOUS
Status: CANCELLED | OUTPATIENT
Start: 2022-08-03

## 2022-08-03 RX ORDER — NALOXONE HYDROCHLORIDE 0.4 MG/ML
0.4 INJECTION, SOLUTION INTRAMUSCULAR; INTRAVENOUS; SUBCUTANEOUS
Status: DISCONTINUED | OUTPATIENT
Start: 2022-08-03 | End: 2022-08-06 | Stop reason: HOSPADM

## 2022-08-03 RX ORDER — FENTANYL CITRATE 50 UG/ML
15 INJECTION, SOLUTION INTRAMUSCULAR; INTRAVENOUS ONCE
Status: DISCONTINUED | OUTPATIENT
Start: 2022-08-03 | End: 2022-08-03 | Stop reason: HOSPADM

## 2022-08-03 RX ORDER — MAGNESIUM SULFATE HEPTAHYDRATE 500 MG/ML
10 INJECTION, SOLUTION INTRAMUSCULAR; INTRAVENOUS
Status: DISCONTINUED | OUTPATIENT
Start: 2022-08-03 | End: 2022-08-06 | Stop reason: HOSPADM

## 2022-08-03 RX ORDER — DIPHENHYDRAMINE HYDROCHLORIDE 50 MG/ML
25 INJECTION INTRAMUSCULAR; INTRAVENOUS EVERY 6 HOURS PRN
Status: DISCONTINUED | OUTPATIENT
Start: 2022-08-03 | End: 2022-08-06 | Stop reason: HOSPADM

## 2022-08-03 RX ORDER — MISOPROSTOL 200 UG/1
400 TABLET ORAL
Status: DISCONTINUED | OUTPATIENT
Start: 2022-08-03 | End: 2022-08-06 | Stop reason: HOSPADM

## 2022-08-03 RX ORDER — CEFAZOLIN SODIUM 1 G/3ML
INJECTION, POWDER, FOR SOLUTION INTRAMUSCULAR; INTRAVENOUS PRN
Status: DISCONTINUED | OUTPATIENT
Start: 2022-08-03 | End: 2022-08-03

## 2022-08-03 RX ORDER — CARBOPROST TROMETHAMINE 250 UG/ML
250 INJECTION, SOLUTION INTRAMUSCULAR
Status: CANCELLED | OUTPATIENT
Start: 2022-08-03

## 2022-08-03 RX ORDER — LIDOCAINE 40 MG/G
CREAM TOPICAL
Status: CANCELLED | OUTPATIENT
Start: 2022-08-03

## 2022-08-03 RX ORDER — FENTANYL CITRATE 50 UG/ML
25 INJECTION, SOLUTION INTRAMUSCULAR; INTRAVENOUS EVERY 5 MIN PRN
Status: DISCONTINUED | OUTPATIENT
Start: 2022-08-03 | End: 2022-08-03 | Stop reason: HOSPADM

## 2022-08-03 RX ORDER — HYDRALAZINE HYDROCHLORIDE 20 MG/ML
10 INJECTION INTRAMUSCULAR; INTRAVENOUS
Status: DISCONTINUED | OUTPATIENT
Start: 2022-08-03 | End: 2022-08-06 | Stop reason: HOSPADM

## 2022-08-03 RX ORDER — MISOPROSTOL 200 UG/1
800 TABLET ORAL
Status: DISCONTINUED | OUTPATIENT
Start: 2022-08-03 | End: 2022-08-06 | Stop reason: HOSPADM

## 2022-08-03 RX ORDER — MISOPROSTOL 200 UG/1
TABLET ORAL
Status: DISCONTINUED
Start: 2022-08-03 | End: 2022-08-03 | Stop reason: HOSPADM

## 2022-08-03 RX ORDER — OXYTOCIN 10 [USP'U]/ML
10 INJECTION, SOLUTION INTRAMUSCULAR; INTRAVENOUS
Status: DISCONTINUED | OUTPATIENT
Start: 2022-08-03 | End: 2022-08-06 | Stop reason: HOSPADM

## 2022-08-03 RX ORDER — OXYTOCIN/0.9 % SODIUM CHLORIDE 30/500 ML
PLASTIC BAG, INJECTION (ML) INTRAVENOUS CONTINUOUS PRN
Status: DISCONTINUED | OUTPATIENT
Start: 2022-08-03 | End: 2022-08-03

## 2022-08-03 RX ORDER — ACETAMINOPHEN 325 MG/1
975 TABLET ORAL ONCE
Status: CANCELLED | OUTPATIENT
Start: 2022-08-03 | End: 2022-08-03

## 2022-08-03 RX ORDER — NALBUPHINE HYDROCHLORIDE 10 MG/ML
2.5-5 INJECTION, SOLUTION INTRAMUSCULAR; INTRAVENOUS; SUBCUTANEOUS EVERY 6 HOURS PRN
Status: DISCONTINUED | OUTPATIENT
Start: 2022-08-03 | End: 2022-08-03

## 2022-08-03 RX ORDER — ONDANSETRON 2 MG/ML
4 INJECTION INTRAMUSCULAR; INTRAVENOUS EVERY 6 HOURS PRN
Status: DISCONTINUED | OUTPATIENT
Start: 2022-08-03 | End: 2022-08-06 | Stop reason: HOSPADM

## 2022-08-03 RX ORDER — SODIUM CHLORIDE, SODIUM LACTATE, POTASSIUM CHLORIDE, CALCIUM CHLORIDE 600; 310; 30; 20 MG/100ML; MG/100ML; MG/100ML; MG/100ML
10-125 INJECTION, SOLUTION INTRAVENOUS CONTINUOUS
Status: DISCONTINUED | OUTPATIENT
Start: 2022-08-03 | End: 2022-08-06 | Stop reason: HOSPADM

## 2022-08-03 RX ORDER — AMOXICILLIN 250 MG
2 CAPSULE ORAL 2 TIMES DAILY
Status: DISCONTINUED | OUTPATIENT
Start: 2022-08-03 | End: 2022-08-06 | Stop reason: HOSPADM

## 2022-08-03 RX ORDER — CEFAZOLIN SODIUM 2 G/100ML
2 INJECTION, SOLUTION INTRAVENOUS SEE ADMIN INSTRUCTIONS
Status: CANCELLED | OUTPATIENT
Start: 2022-08-03

## 2022-08-03 RX ORDER — OXYTOCIN/0.9 % SODIUM CHLORIDE 30/500 ML
340 PLASTIC BAG, INJECTION (ML) INTRAVENOUS CONTINUOUS PRN
Status: CANCELLED | OUTPATIENT
Start: 2022-08-03

## 2022-08-03 RX ORDER — BUPIVACAINE HYDROCHLORIDE 7.5 MG/ML
INJECTION, SOLUTION INTRASPINAL
Status: COMPLETED | OUTPATIENT
Start: 2022-08-03 | End: 2022-08-03

## 2022-08-03 RX ORDER — ACETAMINOPHEN 325 MG/1
975 TABLET ORAL EVERY 6 HOURS
Status: DISCONTINUED | OUTPATIENT
Start: 2022-08-03 | End: 2022-08-06 | Stop reason: HOSPADM

## 2022-08-03 RX ORDER — SODIUM CHLORIDE, SODIUM LACTATE, POTASSIUM CHLORIDE, CALCIUM CHLORIDE 600; 310; 30; 20 MG/100ML; MG/100ML; MG/100ML; MG/100ML
INJECTION, SOLUTION INTRAVENOUS CONTINUOUS
Status: DISCONTINUED | OUTPATIENT
Start: 2022-08-03 | End: 2022-08-03 | Stop reason: HOSPADM

## 2022-08-03 RX ORDER — LABETALOL HYDROCHLORIDE 5 MG/ML
20-80 INJECTION, SOLUTION INTRAVENOUS EVERY 10 MIN PRN
Status: DISCONTINUED | OUTPATIENT
Start: 2022-08-03 | End: 2022-08-06 | Stop reason: HOSPADM

## 2022-08-03 RX ORDER — OXYTOCIN/0.9 % SODIUM CHLORIDE 30/500 ML
100-340 PLASTIC BAG, INJECTION (ML) INTRAVENOUS CONTINUOUS PRN
Status: CANCELLED | OUTPATIENT
Start: 2022-08-03

## 2022-08-03 RX ORDER — ONDANSETRON 2 MG/ML
4 INJECTION INTRAMUSCULAR; INTRAVENOUS EVERY 30 MIN PRN
Status: DISCONTINUED | OUTPATIENT
Start: 2022-08-03 | End: 2022-08-03 | Stop reason: HOSPADM

## 2022-08-03 RX ORDER — FENTANYL CITRATE-0.9 % NACL/PF 10 MCG/ML
100 PLASTIC BAG, INJECTION (ML) INTRAVENOUS EVERY 5 MIN PRN
Status: DISCONTINUED | OUTPATIENT
Start: 2022-08-03 | End: 2022-08-03 | Stop reason: HOSPADM

## 2022-08-03 RX ORDER — TRANEXAMIC ACID 10 MG/ML
1 INJECTION, SOLUTION INTRAVENOUS EVERY 30 MIN PRN
Status: CANCELLED | OUTPATIENT
Start: 2022-08-03

## 2022-08-03 RX ORDER — MAGNESIUM SULFATE IN WATER 40 MG/ML
2 INJECTION, SOLUTION INTRAVENOUS CONTINUOUS
Status: DISPENSED | OUTPATIENT
Start: 2022-08-03 | End: 2022-08-04

## 2022-08-03 RX ORDER — ONDANSETRON 4 MG/1
4 TABLET, ORALLY DISINTEGRATING ORAL EVERY 30 MIN PRN
Status: DISCONTINUED | OUTPATIENT
Start: 2022-08-03 | End: 2022-08-03 | Stop reason: HOSPADM

## 2022-08-03 RX ADMIN — METOCLOPRAMIDE HYDROCHLORIDE 10 MG: 5 INJECTION INTRAMUSCULAR; INTRAVENOUS at 21:39

## 2022-08-03 RX ADMIN — ACETAMINOPHEN 975 MG: 325 TABLET, FILM COATED ORAL at 20:14

## 2022-08-03 RX ADMIN — SODIUM CITRATE AND CITRIC ACID MONOHYDRATE 30 ML: 500; 334 SOLUTION ORAL at 12:09

## 2022-08-03 RX ADMIN — OXYTOCIN-SODIUM CHLORIDE 0.9% IV SOLN 30 UNIT/500ML 300 ML/HR: 30-0.9/5 SOLUTION at 12:53

## 2022-08-03 RX ADMIN — SODIUM CHLORIDE, POTASSIUM CHLORIDE, SODIUM LACTATE AND CALCIUM CHLORIDE 75 ML/HR: 600; 310; 30; 20 INJECTION, SOLUTION INTRAVENOUS at 14:12

## 2022-08-03 RX ADMIN — SENNOSIDES AND DOCUSATE SODIUM 1 TABLET: 50; 8.6 TABLET ORAL at 20:14

## 2022-08-03 RX ADMIN — MAGNESIUM SULFATE HEPTAHYDRATE 4 G: 40 INJECTION, SOLUTION INTRAVENOUS at 11:59

## 2022-08-03 RX ADMIN — DIPHENHYDRAMINE HYDROCHLORIDE 25 MG: 50 INJECTION, SOLUTION INTRAMUSCULAR; INTRAVENOUS at 21:40

## 2022-08-03 RX ADMIN — ONDANSETRON 4 MG: 2 INJECTION INTRAMUSCULAR; INTRAVENOUS at 20:13

## 2022-08-03 RX ADMIN — MORPHINE SULFATE 0.15 MG: 1 INJECTION EPIDURAL; INTRATHECAL; INTRAVENOUS at 12:24

## 2022-08-03 RX ADMIN — EPINEPHRINE 0.1 MG: 1 INJECTION, SOLUTION, CONCENTRATE INTRAVENOUS at 12:24

## 2022-08-03 RX ADMIN — FENTANYL CITRATE 15 MCG: 50 INJECTION, SOLUTION INTRAMUSCULAR; INTRAVENOUS at 12:24

## 2022-08-03 RX ADMIN — TRANEXAMIC ACID 1 G: 1 INJECTION, SOLUTION INTRAVENOUS at 12:53

## 2022-08-03 RX ADMIN — CEFAZOLIN 2 G: 1 INJECTION, POWDER, FOR SOLUTION INTRAMUSCULAR; INTRAVENOUS at 12:31

## 2022-08-03 RX ADMIN — PROCHLORPERAZINE EDISYLATE 5 MG: 5 INJECTION INTRAMUSCULAR; INTRAVENOUS at 14:31

## 2022-08-03 RX ADMIN — ONDANSETRON 4 MG: 2 INJECTION INTRAMUSCULAR; INTRAVENOUS at 12:56

## 2022-08-03 RX ADMIN — PHENYLEPHRINE HYDROCHLORIDE 50 MCG/MIN: 10 INJECTION INTRAVENOUS at 12:22

## 2022-08-03 RX ADMIN — MAGNESIUM SULFATE HEPTAHYDRATE 2 G/HR: 40 INJECTION, SOLUTION INTRAVENOUS at 12:31

## 2022-08-03 RX ADMIN — BUPIVACAINE HYDROCHLORIDE IN DEXTROSE 1.6 ML: 7.5 INJECTION, SOLUTION SUBARACHNOID at 12:24

## 2022-08-03 RX ADMIN — SODIUM CHLORIDE, POTASSIUM CHLORIDE, SODIUM LACTATE AND CALCIUM CHLORIDE 125 ML/HR: 600; 310; 30; 20 INJECTION, SOLUTION INTRAVENOUS at 11:58

## 2022-08-03 ASSESSMENT — ACTIVITIES OF DAILY LIVING (ADL)
ADLS_ACUITY_SCORE: 22
ADLS_ACUITY_SCORE: 22
ADLS_ACUITY_SCORE: 18
ADLS_ACUITY_SCORE: 22

## 2022-08-03 NOTE — PLAN OF CARE
Goal Outcome Evaluation:    Plan of Care Reviewed With: patient, spouse     Overall Patient Progress: improving    VSS, postpartum assessments are WNL ex intermittent nausea and dizziness. She is resting in bed, voiding clear urine in montes de oca catheter. Taking small bites of ice chips. Fundus firm and baseline off to the left, incision dressing is clean dry and intact, scant lochia    Blood pressures have been elevated but not in severe range, and she denies headache, vision changes, SOB, RUQ pain. Reflexes +2 patellar, no clonus. Magnesium IV infusing at 2g/hr, no s/s toxicity.     Plans to pump for baby in NICU. Support person,  Aneesh present and her mother will also visit this evening. Will continue with plan of care.

## 2022-08-03 NOTE — PROGRESS NOTES
Entry delayed due to patient care. Patient seen at 1615    Brief Progress Note    Called to PACU to assess patient due to reports of feeling very dizzy and unable to keep her eyes open. Magnesium was paused and mag level drawn stat. Patient reports feeling as if her headache had initially worsened post operatively, but was starting to improve. She denied feeling any weakness or numbness. No chest pain, SOB or RUQ pain.     O:   Vitals   22 1630 -- 70 -- 70 bpm 120/79 -- 99 %     Gen: Alert, eyes closed. Oriented   CV: RRR  Pulm: CTAB  Fundus firm. 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 Due to severe dizziness, will briefly hold magnesium and obtained stat mag level. Mag level came back at 4.2. Patient improving symptomatically and reflexes continue to be brisk. No concern for pulmonary edema on exam. Will restart magnesium and continue to watch closely.     Mylene Markham MD  OB/GYN Resident, PGY-2

## 2022-08-03 NOTE — BRIEF OP NOTE
Brief Operative Note    Name: Gil Chaudhari  MRN: [id number]  : 1990  Date of Surgery: 2022    Pre-operative Diagnosis:  - IUP @ 35w4d  - Breech Presentation  - Preeclampsia with SF  - Severe FGR  - GDMA1  - Unicornuate uterus   - Uterine Fibroids    Post-operative Diagnosis:  - Same as above  - Postpartum s/p delivery of viable female infant      Procedure(s):   - Primary low transverse  section with double layer uterine closure via Pfannenstiel skin incision    Surgeon:  - Mary Pulliam MD    Assistants:  - Mylene Markham MD, PGY2  - Roseline Byrd, MS4    Anesthesia: Spinal  QBL: 508 mL  UOP: 450 mL clear urine  Fluids: 700 mL crystalloid,   Additional Medications: 2g Ancef preop, 1 gram TXA  Specimens: Cord blood, placenta, cord gases   Complications: None apparent    Findings:   - Viable female infant delivered breech  - APGARs 7 and 9. Weight pending   - Cord gasses: pending  - Clear amniotic fluid, Placenta with large clot noted, otherwise 3 vessel cor.   - Unicornuate uterus with normal left ovary and fallopian tube. Rudimentary horn on right with fallopian tube and ovary present  - Four pedunculated fibroids noted on left aspect of uterus, largest being approximately 4cm.    - Surgical sites noted to be hemostatic at the end of case.     Mylene Markham MD  OB/GYN Resident, PGY-2

## 2022-08-03 NOTE — PROGRESS NOTES
Update on events -     In to see patient to update on most recent labs, which includes platelets dropping to 93. I do believe she has preeclampsia with severe features, at > 34 weeks and I recommend again delivery. The patient and her  have had time to learn more about preeclampsia and low platelets and are now amenable to proceeding with delivery. Discussed surgical team with Ob/Gyn surgeon Dr. Pulliam. Discussed option for regional anesthesia with Dr Ribera and this is still an option for [facility]. I also discussed need for 24 hours of magnesium after delivery (to start now with low platelets < 100) due to preE with severe features diagnosis.     Patient and partner are amenable to delivery at this time and we will proceed with arrangements to do so. Dr. Pulliam and L/D team notified.     Vandana Jones MD

## 2022-08-03 NOTE — PLAN OF CARE
Goal Outcome Evaluation:    Plan of Care Reviewed With: patient, spouse     Patient had  section of viable female at 1252. NICU present for delivery due to gestational age and birth weight, infant is receiving care with the NICU. . 2 hr PP BG was 81. Patient having emesis since surgery, Dr Wan okayed giving compazine IV to help improve nausea. Patient denies pain. Patient is resting in PACU. Patient reports extreeme dizziness, BPs WNL , reflexes 2+ and patellar 1+ which was baseline prior to magnesium. Will continue to monitor closely.

## 2022-08-03 NOTE — PROGRESS NOTES
S: doing well, very happy her labs are stable  We discussed and reviewed options and interventions at this time  She desires to not be delivered  She agrees to labs in 6 hours  We discussed management of diet and glucose testing at this time    O:Temp: 97.9  F (36.6  C) Temp src: Oral BP: (!) 140/102     Resp: 16        Appears well  EFM reassuring, category I  TOCO none    Results for orders placed or performed during the hospital encounter of 08/02/22 (from the past 24 hour(s))   Protein  random urine   Result Value Ref Range    Total Protein Random Urine g/L 0.82 g/L    Total Protein Urine g/gr Creatinine 7.45 (H) 0.00 - 0.20 g/g Cr    Creatinine Urine mg/dL 11 mg/dL   Hemoglobin   Result Value Ref Range    Hemoglobin 12.2 11.7 - 15.7 g/dL   Platelet count   Result Value Ref Range    Platelet Count 103 (L) 150 - 450 10e3/uL   AST   Result Value Ref Range    AST 22 0 - 45 U/L   ALT   Result Value Ref Range    ALT 22 0 - 50 U/L   Creatinine   Result Value Ref Range    Creatinine 0.47 (L) 0.52 - 1.04 mg/dL    GFR Estimate >90 >60 mL/min/1.73m2   Glucose by meter   Result Value Ref Range    GLUCOSE BY METER POCT 64 (L) 70 - 99 mg/dL   Glucose by meter   Result Value Ref Range    GLUCOSE BY METER POCT 74 70 - 99 mg/dL   Glucose by meter   Result Value Ref Range    GLUCOSE BY METER POCT 100 (H) 70 - 99 mg/dL   Hemoglobin   Result Value Ref Range    Hemoglobin 11.7 11.7 - 15.7 g/dL   Platelet count   Result Value Ref Range    Platelet Count 104 (L) 150 - 450 10e3/uL   AST   Result Value Ref Range    AST 16 0 - 45 U/L   ALT   Result Value Ref Range    ALT 18 0 - 50 U/L   Creatinine   Result Value Ref Range    Creatinine 0.49 (L) 0.52 - 1.04 mg/dL    GFR Estimate >90 >60 mL/min/1.73m2   ABO/Rh type and screen    Narrative    The following orders were created for panel order ABO/Rh type and screen.  Procedure                               Abnormality         Status                     ---------                               " -----------         ------                     Adult Type and Screen[[id number]]                            Final result                 Please view results for these tests on the individual orders.   Adult Type and Screen   Result Value Ref Range    ABO/RH(D) B POS     Antibody Screen Negative Negative    SPECIMEN EXPIRATION DATE [id number]      A/P: 33yo  at 35+3 here with pre eclampsia and GDMA1 with mild thrombocytopenia  Patient declines delivery at this time    Will recheck labs at 1am.  Patient may eat \"light\" GDM diet now  Deana Banks MD      "

## 2022-08-03 NOTE — ANESTHESIA CARE TRANSFER NOTE
Patient: Gil Chaudhari    Procedure: Procedure(s):   SECTION       Diagnosis: * No pre-op diagnosis entered *  Diagnosis Additional Information: No value filed.    Anesthesia Type:   No value filed.     Note:    Oropharynx: oropharynx clear of all foreign objects  Level of Consciousness: awake  Oxygen Supplementation: room air        Vital Signs Stable: post-procedure vital signs reviewed and stable  Report to RN Given: handoff report given  Patient transferred to: PACU    Handoff Report: Identifed the Patient, Identified the Reponsible Provider, Reviewed the pertinent medical history, Discussed the surgical course, Reviewed Intra-OP anesthesia mangement and issues during anesthesia, Set expectations for post-procedure period and Allowed opportunity for questions and acknowledgement of understanding      Vitals:  Vitals Value Taken Time   /85 22 1408   Temp     Pulse 73 22 1410   Resp     SpO2 99 % 22 1410   Vitals shown include unvalidated device data.    Electronically Signed By: Anjelica Wan MD  August 3, 2022  2:11 PM

## 2022-08-03 NOTE — PLAN OF CARE
Goal Outcome Evaluation:    Vital signs have been WDL, afebrile. Reports good fetal movement. FHR baseline 130's, moderate variability (periods of minimal), accelerations present, prolonged decel at 0206. Occasional contractions  with [place], although isn't aware of them. Denies having any pain discomfort. Denies headache, visual disturbances, and RUQ pain. No leaking of fluid or vaginal bleeding. Plan to repeat labs at 0700. Would like to eat a proper breakfast if able. Bedside ultrasound requested to confirm presentation related to where heart tones obtained and the prolonged decel; continues to be breech.  is at bedside and supportive.

## 2022-08-03 NOTE — ANESTHESIA PREPROCEDURE EVALUATION
Anesthesia Pre-Procedure Evaluation    Patient: Gil Chaudhari   MRN: [id number] : 1990        Procedure : Procedure(s):   SECTION          Past Medical History:   Diagnosis Date     Diabetes (H)     gestational diabetic- Diet control     Uncomplicated asthma       History reviewed. No pertinent surgical history.   No Known Allergies   Social History     Tobacco Use     Smoking status: Never Smoker     Smokeless tobacco: Never Used   Substance Use Topics     Alcohol use: Never      Wt Readings from Last 1 Encounters:   22 80.4 kg (177 lb 3.2 oz)        Anesthesia Evaluation            ROS/MED HX  ENT/Pulmonary:  - neg pulmonary ROS     Neurologic:  - neg neurologic ROS     Cardiovascular:  - neg cardiovascular ROS     METS/Exercise Tolerance:     Hematologic:  - neg hematologic  ROS     Musculoskeletal:       GI/Hepatic:  - neg GI/hepatic ROS     Renal/Genitourinary:       Endo:     (+) gestational diabetes,    Psychiatric/Substance Use:  - neg psychiatric ROS     Infectious Disease:       Malignancy:       Other:   FGR  (-) previous        Physical Exam    Airway        Mallampati: II   TM distance: > 3 FB   Neck ROM: full   Mouth opening: > 3 cm    Respiratory Devices and Support         Dental  no notable dental history         Cardiovascular   cardiovascular exam normal          Pulmonary   pulmonary exam normal                OUTSIDE LABS:  CBC:   Lab Results   Component Value Date    WBC 4.5 2022    WBC 5.1 2022    HGB 11.4 (L) 2022    HGB 11.4 (L) 2022    HCT 32.9 (L) 2022    HCT 32.4 (L) 2022    PLT 93 (L) 2022     (L) 2022     BMP:   Lab Results   Component Value Date    CR 0.49 (L) 2022    CR 0.45 (L) 2022    GLC 77 2022     (H) 2022     COAGS: No results found for: PTT, INR, FIBR  POC: No results found for: BGM, HCG, HCGS  HEPATIC:   Lab Results   Component Value Date    ALT 17 2022    AST  15 08/03/2022     OTHER: No results found for: PH, LACT, A1C, SHAHRAM, PHOS, MAG, LIPASE, AMYLASE, TSH, T4, T3, CRP, SED    Anesthesia Plan    ASA Status:  3      Anesthesia Type: Spinal.              Consents    Anesthesia Plan(s) and associated risks, benefits, and realistic alternatives discussed. Questions answered and patient/representative(s) expressed understanding.    - Discussed:     - Discussed with:  Patient         Postoperative Care            Comments:                Anjelica Wan MD

## 2022-08-03 NOTE — PLAN OF CARE
Goal Outcome Evaluation:    Plan of Care Reviewed With: patient      BP's WNL to non severe range, denies HA, visual changes and no epigastric pain but platelets decreasing and currently 93. C/S recommended by . Patient and spouse in agreement with the plan. Preparing for surgery. Mag sulfate pre op as ordered. Tolerated Mag load well. FHT's 130 with moderate variability, accelerations and no decelerations. No contractions, SROM/leaking and membranes intact. Plan to have NICU present at delivery.

## 2022-08-03 NOTE — PROGRESS NOTES
Antepartum Progress Note    S: Patient feeling well again this am. Happy that she did not need to be delivered overnight and is asking when she can eat and when she might be able to go home. Has not had vaginal bleeding or contractions or leakage of fluid. Reports normal FM.    Reviewed labs overnight, with all platelet levels > 100 and BP all within mild range HTN with no intervention.   She has not had a headache, vision change or epigastric pain.     States her goal is still at least a week until delivery and is not wanting to be delivered at this time.     O:   Patient Vitals for the past 24 hrs:   BP Temp Temp src Resp Weight   08/03/22 0505 -- -- -- -- 81.4 kg (179 lb 8 oz)   08/03/22 0500 111/79 -- -- 18 --   08/03/22 0050 133/78 98.4  F (36.9  C) Oral 18 --   08/02/22 2215 -- -- -- -- 81.4 kg (179 lb 8 oz)   08/02/22 2105 (!) 140/84 -- -- -- --   08/02/22 1955 (!) 139/100 -- -- -- --   08/02/22 1857 (!) 140/102 -- -- -- --   08/02/22 1744 (!) 137/99 -- -- -- --   08/02/22 1714 (!) 138/100 -- -- -- --   08/02/22 1637 (!) 143/99 -- -- -- --   08/02/22 1622 (!) 142/95 -- -- -- --   08/02/22 1607 (!) 133/94 -- -- 16 --   08/02/22 1552 (!) 143/94 -- -- -- --   08/02/22 1537 (!) 134/101 -- -- -- --   08/02/22 1522 (!) 138/104 -- -- -- --   08/02/22 1507 (!) 148/96 -- -- -- --   08/02/22 1452 (!) 140/101 -- -- -- --   08/02/22 1437 (!) 134/98 -- -- -- --   08/02/22 1422 (!) 142/100 -- -- -- --   08/02/22 1407 (!) 142/98 -- -- -- --   08/02/22 1352 (!) 139/97 -- -- -- --   08/02/22 1337 (!) 134/91 -- -- 16 --   08/02/22 1321 (!) 141/87 97.9  F (36.6  C) Oral 16 --     Gen: Resting in bed, no apparent distress  Remainder of exam deferred at this time.     Weight:   Wt Readings from Last 2 Encounters:   08/03/22 81.4 kg (179 lb 8 oz)   07/28/22 80.7 kg (178 lb)       FHT: 130 baseline, moderate variability, present accels, absent decels  TOCO: uterine irritability, patient not feeling    I/Os: from yesterday 300  in/360 out net -60 mL    Labs:   Results for orders placed or performed during the hospital encounter of 08/02/22 (from the past 24 hour(s))   Protein  random urine   Result Value Ref Range    Total Protein Random Urine g/L 0.82 g/L    Total Protein Urine g/gr Creatinine 7.45 (H) 0.00 - 0.20 g/g Cr    Creatinine Urine mg/dL 11 mg/dL   Hemoglobin   Result Value Ref Range    Hemoglobin 12.2 11.7 - 15.7 g/dL   Platelet count   Result Value Ref Range    Platelet Count 103 (L) 150 - 450 10e3/uL   AST   Result Value Ref Range    AST 22 0 - 45 U/L   ALT   Result Value Ref Range    ALT 22 0 - 50 U/L   Creatinine   Result Value Ref Range    Creatinine 0.47 (L) 0.52 - 1.04 mg/dL    GFR Estimate >90 >60 mL/min/1.73m2   Glucose by meter   Result Value Ref Range    GLUCOSE BY METER POCT 64 (L) 70 - 99 mg/dL   Glucose by meter   Result Value Ref Range    GLUCOSE BY METER POCT 74 70 - 99 mg/dL   Asymptomatic COVID-19 Virus (Coronavirus) by PCR Nose    Specimen: Nose; Swab   Result Value Ref Range    SARS CoV2 PCR Negative Negative, Testing sent to reference lab. Results will be returned via unsolicited result    Narrative    Testing was performed using the Xpert Xpress SARS-CoV-2 Assay on the  Cepheid Gene-Xpert Instrument Systems. Additional information about  this Emergency Use Authorization (EUA) assay can be found via the Lab  Guide. This test should be ordered for the detection of SARS-CoV-2 in  individuals who meet SARS-CoV-2 clinical and/or epidemiological  criteria. Test performance is unknown in asymptomatic patients. This  test is for in vitro diagnostic use under the FDA EUA for  laboratories certified under CLIA to perform high complexity testing.  This test has not been FDA cleared or approved. A negative result  does not rule out the presence of PCR inhibitors in the specimen or  target RNA in concentration below the limit of detection for the  assay. The possibility of a false negative should be considered if  the  patient's recent exposure or clinical presentation suggests  COVID-19. This test was validated by the [facility]. This laboratory is certified under  the Clinical Laboratory Improvement Amendments of 1988 (CLIA-88) as  qualified to perform high complexity laboratory testing.     Glucose by meter   Result Value Ref Range    GLUCOSE BY METER POCT 100 (H) 70 - 99 mg/dL   Hemoglobin   Result Value Ref Range    Hemoglobin 11.7 11.7 - 15.7 g/dL   Platelet count   Result Value Ref Range    Platelet Count 104 (L) 150 - 450 10e3/uL   AST   Result Value Ref Range    AST 16 0 - 45 U/L   ALT   Result Value Ref Range    ALT 18 0 - 50 U/L   Creatinine   Result Value Ref Range    Creatinine 0.49 (L) 0.52 - 1.04 mg/dL    GFR Estimate >90 >60 mL/min/1.73m2   ABO/Rh type and screen    Narrative    The following orders were created for panel order ABO/Rh type and screen.  Procedure                               Abnormality         Status                     ---------                               -----------         ------                     Adult Type and Screen[[id number]]                            Final result                 Please view results for these tests on the individual orders.   Adult Type and Screen   Result Value Ref Range    ABO/RH(D) B POS     Antibody Screen Negative Negative    SPECIMEN EXPIRATION DATE [id number]    Glucose by meter   Result Value Ref Range    GLUCOSE BY METER POCT 139 (H) 70 - 99 mg/dL   ALT   Result Value Ref Range    ALT 25 0 - 50 U/L   AST   Result Value Ref Range    AST 27 0 - 45 U/L   CBC with platelets   Result Value Ref Range    WBC Count 5.1 4.0 - 11.0 10e3/uL    RBC Count 3.43 (L) 3.80 - 5.20 10e6/uL    Hemoglobin 11.4 (L) 11.7 - 15.7 g/dL    Hematocrit 32.4 (L) 35.0 - 47.0 %    MCV 95 78 - 100 fL    MCH 33.2 (H) 26.5 - 33.0 pg    MCHC 35.2 31.5 - 36.5 g/dL    RDW 14.3 10.0 - 15.0 %    Platelet Count 102 (L) 150 - 450 10e3/uL    Creatinine   Result Value Ref Range    Creatinine 0.45 (L) 0.52 - 1.04 mg/dL    GFR Estimate >90 >60 mL/min/1.73m2   Glucose by meter   Result Value Ref Range    GLUCOSE BY METER POCT 77 70 - 99 mg/dL     Imaging: no US this am.     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   Platelets have remained low but stable overnight at just above 100. Patient aware of our recommendation for delivery based on our concern about preE with severe features > 34 weeks, but at this time has opted not to undergo delivery (c/s for malpresentation) and has personal goal of making it a week longer. She is aware of our concerns about expectant management. She is overall stable at this time with no severe HTN overnight and no plt drop to < 100. She is aware that if her platelets drop to <100 this signifies potential worsening of status and we again continue to recommend delivery. She is open to close surveillance in the hospital at this time.     # Preeclampsia, likely with severe features - at this time has mild HTN, stable, and platelets just above 100, stable. She has significant proteinuria on PC ratio yesterday with no history of renal disease prior to pregnancy and no baseline PC ratio.   - Inpatient status recommended given uncertain trajectory of disease, and our recommendation to consider delivery. After shared decision making they will re-consider delivery if platelets drop to < 100 or other clinical findings change  -No magnesium at this time given no sustained severe HTN, and stable plt > 100. If they drop to < 100, I do recommend starting magnesium and again reinforcing our recommendation for delivery.   -serial labs every 4-6 hours - will redraw now to determine next steps  - got betamethasone prior  to 34 weeks, no indication to repeat at this time  - HELLP labs: Cr 0.47, AST 22, ALT 22, Hgb 12.2, UPC 7.45, Plts 103  - Serial BP monitoring  - Will consent for  after she considers our recommendations after her platelet level this morning.      #GDMA1  - Currently well controlled with diet  - Fasting BG in AM and 1 hour pp (when eating). One mild elevation to 138 yesterday     #Fetal well-being  - Reactive and reassuring NST  - BPP 8/10 8/3 in clinic  - Breech presentation per  US  - BMZ course at 27w - would not repeat after 34 weeks.   - Continuous fetal monitoring - can consider spacing out to TID monitoring given reassuring status and generally stable maternal status at this time  - NICU consult appreciated  - pediatrics aware of septo-optic dysplasia     #General Care  - Rh +, antibody -  - HepB/HIV/RPR non-reactive  - Rubella nonimmune  - S/p Tdap, COVID vaccine x3  - GBS unknown, collected   - If continues to opt not to be delivered, will allow to eat as I do not feel parenteral nutrition is indicated at this time. We do not typically recommend regular diet if  delivery is anticipated for this indication, but as she is not ready to proceed with delivery at this time, and has had limited PO intake since yesterday morning, we have opted for regular diet after her labs are back this morning, if her platelets remain > 100.      #uterine anomaly  - recommend  renal US of patient given association of renal abnormalities with uterine anomaly - it does not appear that this has happened in the past based on our chart review.        Delivery Plan: C/S recommended, will be further reinforced if labs are worsening when assessed this morning.     I personally saw this patient and discussed assessment and plan with her and her partner. We will re-discuss status and plan after morning labs, sooner if anything changes clinically.     Vandana Jones MD

## 2022-08-03 NOTE — PROVIDER NOTIFICATION
08/03/22 1625   Provider Notification   Provider Name/Title Dr Wan   Method of Notification Phone   Request Evaluate in Person   Notification Reason Status Update   Patient still remains extremely dizzy. Magnesium level was appropriate. VSS. Patient reports dizziness has not improved since surgery. Dr Wan at bedside to assess patient.

## 2022-08-03 NOTE — ANESTHESIA POSTPROCEDURE EVALUATION
Patient: Gil Chaudhari    Procedure: Procedure(s):   SECTION       Anesthesia Type:  Spinal    Note:  Disposition: Inpatient   Postop Pain Control: Uneventful            Sign Out: Well controlled pain   PONV: Yes            Sign Out: PONV/POV resolved with treatment   Neuro/Psych: Uneventful            Sign Out: Acceptable/Baseline neuro status   Airway/Respiratory: Uneventful            Sign Out: Acceptable/Baseline resp. status   CV/Hemodynamics: Uneventful            Sign Out: Acceptable CV status; No obvious hypovolemia; No obvious fluid overload   Other NRE: NONE   DID A NON-ROUTINE EVENT OCCUR? No           Last vitals:  Vitals:    22 1205 22 1210 22 1403   BP: 126/88 (!) 127/92 105/87   Pulse:   78   Resp:   16   Temp:   36.6  C (97.9  F)   SpO2:  98%        Electronically Signed By: Caitlyn Ribera MD  August 3, 2022  2:21 PM

## 2022-08-03 NOTE — PROVIDER NOTIFICATION
08/03/22 0214   Labor Interventions   Labor Interventions provider notified   Provider Notification   Provider Name/Title Dr. De La Torre   Method of Notification Electronic Page   Request Evaluate - Remote   Notification Reason Decels   Dr. De La Torre notified of prolonged deceleration that started at 0206 until 0210. Filiberto to 80. Resolved with repositioning to patient from left side to right side.

## 2022-08-03 NOTE — PROVIDER NOTIFICATION
08/03/22 1630   Provider Notification   Provider Name/Title Dr Markham   Method of Notification Phone   Notification Reason Status Update   Patient is still feeling dizzy, was cleared by anesthesia. Provider okayed for patient to transfer to [facility].

## 2022-08-03 NOTE — ANESTHESIA PROCEDURE NOTES
Intrathecal injection Procedure Note    Pre-Procedure   Staff -        Anesthesiologist:  Caitlyn Ribera MD       Performed By: anesthesiologist       Location: OR       Procedure Start/Stop Times: 8/3/2022 12:21 PM and 8/3/2022 12:47 PM       Pre-Anesthestic Checklist: patient identified, IV checked, risks and benefits discussed, informed consent, monitors and equipment checked and pre-op evaluation  Timeout:       Correct Patient: Yes        Correct Site: Yes        Correct Position: Yes   Procedure Documentation  Procedure: intrathecal injection       Diagnosis: c/s       Patient Position: sitting       Skin prep: Chloraprep       Insertion Site: L2-3. (midline approach).       Needle Gauge: 25.        Needle Length (Inches): 3.5        Spinal Needle Type: Pencan       Introducer used       # of attempts:  # of redirects:  0    Assessment/Narrative         Paresthesias: Yes and Resolved.       Sensory Level: T4       CSF fluid: clear.    Medication(s) Administered   0.75% Hyperbaric Bupivacaine (Intrathecal) - Intrathecal   1.6 mL - 8/3/2022 12:24:00 PM  Epinephrine 1000 mcg/mL (Intrathecal) - Intrathecal   0.1 mg - 8/3/2022 12:24:00 PM  Fentanyl PF (Intrathecal) - Intrathecal   15 mcg - 8/3/2022 12:24:00 PM  Morphine PF 1 mg/mL (Intrathecal) - Intrathecal   0.15 mg - 8/3/2022 12:24:00 PM  Medication Administration Time: 8/3/2022 12:21 PM     Comments:  ANKIT garrett @ [phone]

## 2022-08-03 NOTE — PLAN OF CARE
Patient arrived to [facility] unit via zoom cart at 1700,with belongings, accompanied by spouse/ significant other, with infant in NICU. Received report from [facility] Collins. Unit and room orientation completd. Call light given; no concerns present at this time. Continue with plan of care.

## 2022-08-03 NOTE — PLAN OF CARE
Goal Outcome Evaluation:    Plan of Care Reviewed With: patient, spouse      Data: Gil Chaudhari transferred to room 7135 via zoomcart. Baby in NICU, patient declined to visit at this time, would like to wait until she is feeling better.  Action: Receiving unit notified of transfer: Yes. Patient and family notified of room change. Report given to Geraldine DENG at 1530. Belongings sent to receiving unit.  Oriented patient to surroundings. Call light within reach.   Response: Patient tolerated transfer and is stable.

## 2022-08-03 NOTE — PROVIDER NOTIFICATION
08/03/22 1620   Provider Notification   Provider Name/Title Dr Markham   Method of Notification Phone   Request Evaluate-Remote   Notification Reason Lab Results   Dr Markham okayed for Magnesium to be turned back on. Magnesium restarted at 2g/hr.   SUBJECTIVE: Patient has cough for the last week. She has no fever and her appetite is been good and she's had no vomiting or diarrhea.    OBJECTIVE: Examination of the patient reveals:   Visit Vitals   • Temp 96 °F (35.6 °C)   • Ht 3' 5\" (1.041 m)   • Wt (!) 25.9 kg   • BMI 23.84 kg/m2     GENERAL: appears stated age, well developed and well nourished, in no distress and normal affect  SKIN normal color, normal texture, normal turgor, no skin rashes, no atypical appearing skin lesions and no bruises  HEAD: normocephalic  EARS: pinna and external ear is normal bilaterally, external auditory canals are normal, there is no discomfort on traction of the pinna or palpation of the tragus, auditory acuity is grossly normal and ABNORMAL FINDINGS>> bilateral serous effusion with erythema of the tympanic membrane and early distortion of landmarks  MOUTH/THROAT: tongue is midline and appears normal, soft palate and uvula are normal, oral mucosa is normal and ABNORMAL FINDINGS>> mild oropharyngeal erythema  NECK: neck is supple, no thyromegaly, no anterior cervical adenopathy, no posterior cervical adenopathy and no supraclavicular adenopathy  CHEST: contour is normal with normal AP diameter, normal respiratory excursion and respiratory effort is not labored  LUNGS: no rhonchi, no wheezes and ABNORMAL FINDINGS>> expiratory Rales  HEART: normal PMI, normal rate and rhythm, S1 and S2 normal, no S3 or S4, no murmurs and no extra heart sounds  ABDOMEN: abdomen is soft, normal active bowel sounds, nontender, without masses, without hepatomegaly and without splenomegaly    ASSESSMENT: (H66.93) Bilateral acute otitis media  (primary encounter diagnosis)  Comment:    Plan: Antibiotic as noted    (J40) Bronchitis  Comment:    Plan: Follow very closely if there is any shortness of breath or wheezing      Return visit/disposition noted below       abnormal lab result

## 2022-08-03 NOTE — PROVIDER NOTIFICATION
08/03/22 1520   Provider Notification   Provider Name/Title Dr Markham   Method of Notification Electronic Page;Phone   Request Evaluate in Person   Notification Reason Status Update   Pt c/o severe dizziness, unable to keep eyes open. VSS. Reflexes stable. Wondering if we should check a magnesium level.

## 2022-08-04 ENCOUNTER — APPOINTMENT (OUTPATIENT)
Dept: ULTRASOUND IMAGING | Facility: CLINIC | Age: 32
End: 2022-08-04
Payer: COMMERCIAL

## 2022-08-04 LAB
ALT SERPL W P-5'-P-CCNC: 20 U/L (ref 0–50)
AST SERPL W P-5'-P-CCNC: 27 U/L (ref 0–45)
CREAT SERPL-MCNC: 0.48 MG/DL (ref 0.52–1.04)
ERYTHROCYTE [DISTWIDTH] IN BLOOD BY AUTOMATED COUNT: 14.4 % (ref 10–15)
GFR SERPL CREATININE-BSD FRML MDRD: >90 ML/MIN/1.73M2
HCT VFR BLD AUTO: 32.4 % (ref 35–47)
HGB BLD-MCNC: 11.4 G/DL (ref 11.7–15.7)
MCH RBC QN AUTO: 33.1 PG (ref 26.5–33)
MCHC RBC AUTO-ENTMCNC: 35.2 G/DL (ref 31.5–36.5)
MCV RBC AUTO: 94 FL (ref 78–100)
PLATELET # BLD AUTO: 100 10E3/UL (ref 150–450)
RBC # BLD AUTO: 3.44 10E6/UL (ref 3.8–5.2)
WBC # BLD AUTO: 9.3 10E3/UL (ref 4–11)

## 2022-08-04 PROCEDURE — 36415 COLL VENOUS BLD VENIPUNCTURE: CPT

## 2022-08-04 PROCEDURE — 85027 COMPLETE CBC AUTOMATED: CPT

## 2022-08-04 PROCEDURE — 84450 TRANSFERASE (AST) (SGOT): CPT | Performed by: STUDENT IN AN ORGANIZED HEALTH CARE EDUCATION/TRAINING PROGRAM

## 2022-08-04 PROCEDURE — 250N000013 HC RX MED GY IP 250 OP 250 PS 637: Performed by: STUDENT IN AN ORGANIZED HEALTH CARE EDUCATION/TRAINING PROGRAM

## 2022-08-04 PROCEDURE — 258N000003 HC RX IP 258 OP 636: Performed by: OBSTETRICS & GYNECOLOGY

## 2022-08-04 PROCEDURE — 82565 ASSAY OF CREATININE: CPT | Performed by: STUDENT IN AN ORGANIZED HEALTH CARE EDUCATION/TRAINING PROGRAM

## 2022-08-04 PROCEDURE — 120N000002 HC R&B MED SURG/OB UMMC

## 2022-08-04 PROCEDURE — 76770 US EXAM ABDO BACK WALL COMP: CPT | Mod: 26 | Performed by: RADIOLOGY

## 2022-08-04 PROCEDURE — 250N000013 HC RX MED GY IP 250 OP 250 PS 637

## 2022-08-04 PROCEDURE — 250N000011 HC RX IP 250 OP 636

## 2022-08-04 PROCEDURE — 76770 US EXAM ABDO BACK WALL COMP: CPT

## 2022-08-04 PROCEDURE — 250N000011 HC RX IP 250 OP 636: Performed by: STUDENT IN AN ORGANIZED HEALTH CARE EDUCATION/TRAINING PROGRAM

## 2022-08-04 PROCEDURE — 84460 ALANINE AMINO (ALT) (SGPT): CPT | Performed by: STUDENT IN AN ORGANIZED HEALTH CARE EDUCATION/TRAINING PROGRAM

## 2022-08-04 RX ORDER — NIFEDIPINE 30 MG/1
30 TABLET, EXTENDED RELEASE ORAL ONCE
Status: COMPLETED | OUTPATIENT
Start: 2022-08-04 | End: 2022-08-04

## 2022-08-04 RX ORDER — NIFEDIPINE 30 MG/1
60 TABLET, EXTENDED RELEASE ORAL DAILY
Status: DISCONTINUED | OUTPATIENT
Start: 2022-08-04 | End: 2022-08-04

## 2022-08-04 RX ORDER — NIFEDIPINE 30 MG/1
30 TABLET, EXTENDED RELEASE ORAL DAILY
Status: DISCONTINUED | OUTPATIENT
Start: 2022-08-04 | End: 2022-08-04

## 2022-08-04 RX ORDER — NIFEDIPINE 30 MG/1
60 TABLET, EXTENDED RELEASE ORAL DAILY
Status: DISCONTINUED | OUTPATIENT
Start: 2022-08-05 | End: 2022-08-06 | Stop reason: HOSPADM

## 2022-08-04 RX ORDER — NIFEDIPINE 30 MG/1
30 TABLET, EXTENDED RELEASE ORAL EVERY MORNING
Status: DISCONTINUED | OUTPATIENT
Start: 2022-08-05 | End: 2022-08-04

## 2022-08-04 RX ADMIN — METOCLOPRAMIDE HYDROCHLORIDE 10 MG: 5 INJECTION INTRAMUSCULAR; INTRAVENOUS at 03:38

## 2022-08-04 RX ADMIN — SENNOSIDES AND DOCUSATE SODIUM 1 TABLET: 50; 8.6 TABLET ORAL at 20:14

## 2022-08-04 RX ADMIN — OXYCODONE HYDROCHLORIDE 5 MG: 5 TABLET ORAL at 20:14

## 2022-08-04 RX ADMIN — SODIUM CHLORIDE, POTASSIUM CHLORIDE, SODIUM LACTATE AND CALCIUM CHLORIDE 50 ML/HR: 600; 310; 30; 20 INJECTION, SOLUTION INTRAVENOUS at 03:57

## 2022-08-04 RX ADMIN — SENNOSIDES AND DOCUSATE SODIUM 2 TABLET: 50; 8.6 TABLET ORAL at 11:10

## 2022-08-04 RX ADMIN — SIMETHICONE 80 MG: 80 TABLET, CHEWABLE ORAL at 15:28

## 2022-08-04 RX ADMIN — ACETAMINOPHEN 975 MG: 325 TABLET, FILM COATED ORAL at 07:35

## 2022-08-04 RX ADMIN — ACETAMINOPHEN 975 MG: 325 TABLET, FILM COATED ORAL at 02:09

## 2022-08-04 RX ADMIN — MAGNESIUM SULFATE HEPTAHYDRATE 2 G/HR: 40 INJECTION, SOLUTION INTRAVENOUS at 07:57

## 2022-08-04 RX ADMIN — NIFEDIPINE 30 MG: 30 TABLET, FILM COATED, EXTENDED RELEASE ORAL at 00:21

## 2022-08-04 RX ADMIN — ACETAMINOPHEN 975 MG: 325 TABLET, FILM COATED ORAL at 15:21

## 2022-08-04 RX ADMIN — OXYCODONE HYDROCHLORIDE 5 MG: 5 TABLET ORAL at 07:35

## 2022-08-04 RX ADMIN — NIFEDIPINE 30 MG: 30 TABLET, FILM COATED, EXTENDED RELEASE ORAL at 21:36

## 2022-08-04 RX ADMIN — OXYCODONE HYDROCHLORIDE 5 MG: 5 TABLET ORAL at 02:10

## 2022-08-04 RX ADMIN — OXYCODONE HYDROCHLORIDE 5 MG: 5 TABLET ORAL at 15:28

## 2022-08-04 RX ADMIN — ACETAMINOPHEN 975 MG: 325 TABLET, FILM COATED ORAL at 21:37

## 2022-08-04 ASSESSMENT — ACTIVITIES OF DAILY LIVING (ADL)
ADLS_ACUITY_SCORE: 19
ADLS_ACUITY_SCORE: 22
ADLS_ACUITY_SCORE: 19
ADLS_ACUITY_SCORE: 18
ADLS_ACUITY_SCORE: 19
ADLS_ACUITY_SCORE: 18
ADLS_ACUITY_SCORE: 19
ADLS_ACUITY_SCORE: 22
ADLS_ACUITY_SCORE: 19
ADLS_ACUITY_SCORE: 22
ADLS_ACUITY_SCORE: 18
ADLS_ACUITY_SCORE: 22

## 2022-08-04 NOTE — PLAN OF CARE
Goal Outcome Evaluation:    Started on Nifedipine for elevated blood pressures, other vital signs WDL. Fundus is firm, scant bleeding. Pain is being managed with Tylenol and oxycodone. Pumping encouraged every 3-4 hours. Denies nausea/vomititng. Denies headache, visual disturbances, and RUQ pain. Currently has Magnesium infusing at 2gm/hr. Plan to discontinue at 1230. No signs or symptoms of magnesium toxicity. Ambulated to bathroom with standby assist. Pericare performed and catheter removed at 0355. Tolerated well - denied feeling dizzy or lightheaded. Continue with current plan of care.

## 2022-08-04 NOTE — PROGRESS NOTES
Postpartum Progress / Magnesium Check Note     S: She is resting comfortably in bed this morning.  Complains of itching, for which she took Benadryl earlier in the night.  However the Benadryl makes her sleepy, just she does not want to take any right now. Pain is improving and well controlled on current medication regimen. Tolerating PO intake.  Lochia present and similar to peak menses.  Her Archer was removed this morning, and she has not yet voided.  She did ambulate to the bathroom for Archer removal, and denies lightheadedness or dizziness.  She has not yet passed flatus or bowel movement. She denies headache, changes in vision, nausea/vomiting, chest pain, shortness of breath, RUQ pain, or worsening edema.  Plans to breastfeed. No other questions or concerns today.    O:  Patient Vitals for the past 4 hrs:   BP Pulse Resp SpO2   22 0340 (!) 137/92 92 18 100 %     Gen: Well-appearing  CV: RRR, no murmurs  Pulm: CTAB, no wheezes or crackles  Abd: Fundus is below the umbilicus, firm and appropriately tender. Soft, nondistended abdomen.  Incision: clean, dry, intact without surrounding erythema/fluctance.  Ext: 1+ LE edema, 2+ biceps/brachioradialis reflexes, 1 beat of clonus b/l    I/O last 3 completed shifts:  In: 1776 [P.O.:700; I.V.:1076]  Out: 4231 [Urine:2923; Emesis/NG output:800; Blood:508]    Wt Readings from Last 2 Encounters:   22 80.4 kg (177 lb 3.2 oz)   22 80.7 kg (178 lb)      UOP: 3.4 ml/kg/hr since 0000      Recent Labs   Lab 22  0827 22  0049 22  1854   HGB 11.4* 11.4* 11.7   PLT 93* 102* 104*   CR 0.49* 0.45* 0.49*   AST 15 27 16   ALT 17 25 18       A/P:  Gil Chaudhari is a 32 year old  POD#1 s/p PLTCS for preeclampsia with severe features based on thrombocytopenia. She was admitted to antepartum on  to rule-out preE with SF, and was found to have thrombocytopenia. Delivery was strongly advised at that time, but the patient desired expectant management  with trending her platelets. On 8/3, her platelets were 93, and therefore CS was strongly recommended. Pregnancy c/b severe FGR, breech presentation, unicornuate uterus, fibroids, GDMA1, and rubella NI.    # Preeclampsia with severe features (platelets)  - BP: high mild range  - Continue close monitoring and treat sustained SR BP systolic >160 or diastolic >110 with IV antihypertensives  - Meds: D#2 Nifedipine 30 mg XL daily  - Symptoms: None, continue to monitor.  - UOP: Adequate, continue strict I/O's as above.  - HELLP labs:    - plt 103 > 104 >102 > 93   - Otherwise normal, repeat labs pending  - Magnesium sulfate: 4g loading dose (1130) > 2g/hr > held due to dizziness (1530) > Mg level 4.2 > Mg restarted (1620)   - Continue for 24h postpartum   - q4h clinical mag checks  - No evidence of magnesium toxicity on exam    #Postpartum Care  - Continue routine postpartum management  - Rh+, Rubella immune  - Pain: Continue ibuprofen, tylenol, oxy PRN  - Heme: Hgb 11.4 >  (TXA) > AM Hgb ordered, asymptomatic  - FEN/GI: regular diet, stool softeners PRN  - : Archer in place  - Contraception: Undecided, considering LARC. 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  - Feeding: Breastfeeding, no concerns  - Baby: doing well in NICU    # Itching - likely due to anesthetics. Benadryl PRN    # GDMA1 - Fasting BG in AM    # Unicornuate uterus - renal US ordered    Dispo: anticipate discharge to home on POD#2-3.  Follow-up in 1 week for BP check and 6 weeks for routine postpartum visit.    Jo-Ann De La Torre MD  OB/GYN, PGY-3  08/04/2022, 6:40 AM     I have seen and examined the patient without the resident. I have reviewed, edited, and agree with the note.   My findings are: sleeping comfortably  Incision CDI  LE wnl  Hemoglobin   Date Value Ref Range Status   08/04/2022 11.4 (L) 11.7 - 15.7 g/dL Final   08/03/2022 11.4 (L) 11.7 - 15.7 g/dL Final       Deana  Martina Banks MD

## 2022-08-04 NOTE — CONSULTS
[facility]  MATERNAL CHILD HEALTH   INITIAL NICU PSYCHOSOCIAL ASSESSMENT     DATA:     Presenting Information: Mom, Gil, is a 32 year old  who delivered an infant girl , Kristi on 8/3/22 at 35w4d gestation in the setting of . 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 SW was consulted to meet with this family per NICU admission of infant.     Living Situation: Parents, Gil and Aneesh, are  and live together in [place], [place].     Social Support: Gil's mother is visiting for 3 months from [place]. She also has relatives in [place] and [place]. Aneesh's sister lives locally.     Education/Employment: Dad is an  at  and is saving his paternity time for when baby comes home. Mom is not currently working due to complicated and difficult pregnancy.     Insurance: [employer]     Source of Financial Support: parental employment    Mental Health History: Mom endorses history of anxiety and  depression due to history of early pregnancy loss in  and . She relies on her mother and  for support. She is trying to focus on one day at a time. SW discussed signs, symptoms and resources for postpartum depression. SW offered to help connect pt with therapy, peer support and/or support groups. Parent is not interested at this time. SW encouraged her to reach out to this writer if she needs help finding support in the future. SW provided info about the National Maternal Mental Health hotline that provides real time connections to counselors and resources in her area.     Chemical Health History: None per pt     Legal/Child Protection Involvement: None per pt     INTERVENTION:       Chart review    Collaboration with team: ISH Forman     Conducted Psychosocial Assessment    Introduction to Maternal Child Health SW role and scope of practice    Orientation to  the NICU (parking, lodging, meals, visitation)    Validated emotions and provided supportive listening    SW described process for birth certificate, social security card and insurance process.     Provided resources and referrals    info to purchase discounted parking pass    Provided psychoeducation on  mood disorders and indicated that SW would continue to monitor mood and support bridging to mental health resources as needed.    Provided SW contact info    ASSESSMENT:     Coping: Mother reports her pregnancy has been very difficult and complicated. She has had multiple health conditions and has needed multiple appointments. She is grateful she has not been working during this pregnancy. She found out about the absent optic nerves a few weeks ago and mentioned she was shocked by this hard news, but knows they don't know that this means for Kristi yet. She is hopeful to have more info after she gets the MRI sometime in the next few days. Mom is trying to focus on one moment at a time.     She is recovering physically and was still on magnesium, which she mentioned has made her very woozy. She is feeling better physically and hopes to rest more and then visit baby soon. She is grateful she is here and doing well, but admits she is early, which is scary and it is hard to be  from her in the NICU. SW discussed NICU layout, visitor policy, family lounge and info to get a discounted parking pass.    Assessment of parental risk for PMAD: Higher than average risk, considering medicaly complexity, history of early pregnancy loss, first baby, first NICU experience    Risk Factors: first time parents, limited social support, history of infertility/loss, hospitalization during a global pandemic and unexpected hospitalization     Resiliency Factors & Strengths: local family, experienced parents, parental employment, stable housing, reliable transportation, able and willing to ask for help/accept help, able and  willing to ask advocate for self/baby, demonstrated commitment to being present and engaged in baby's cares and demonstrated ability to integrate new information     PLAN:     SW will continue to follow for supportive intervention.    KP Avalos, [facility]  Maternal and Child Health   Office: [phone]  Pager: [phone]  After Hours Pager: [phone]  [email or web address]

## 2022-08-04 NOTE — PROGRESS NOTES
Magnesium Check Note     S: Patient is feeling alright. Had multiple episodes of emesis recently, but nausea is now improving after prn medications were given. Had a headache earlier, but this is also improving. Denies visual changes, chest pain, shortness of breath, RUQ/epigastric pain, or worsening extremity edema.    O:  Patient Vitals for the past 4 hrs:   BP Temp Temp src Pulse Resp SpO2   22 2350 (!) 153/108 98.4  F (36.9  C) Oral 88 16 98 %   22 2200 (!) 152/113 -- -- 83 16 --   22 2100 (!) 136/99 -- -- 68 16 --     Gen: Well-appearing  CV: RRR, no murmurs  Pulm: CTAB, no wheezes or crackles  Abd:  Soft, nondistended abdomen. No RUQ tenderness.  Ext: Trace LE edema, 2+ biceps/brachioradialis reflexes, 1 beat of clonus in ankles b/l      Intake/Output Summary (Last 24 hours) at 2022 0015  Last data filed at 8/3/2022 2146  Gross per 24 hour   Intake 1776 ml   Output 4231 ml   Net -2455 ml       Wt Readings from Last 2 Encounters:   22 80.4 kg (177 lb 3.2 oz)   22 80.7 kg (178 lb)        UOP: 1.67 mL/kg/hour since 2100    Recent Labs   Lab 22  0827 22  0049 22  1854   HGB 11.4* 11.4* 11.7   PLT 93* 102* 104*   CR 0.49* 0.45* 0.49*   AST 15 27 16   ALT 17 25 18       A/P:  Gil Chaudhari is a 32 year old  POD#0 s/p PLTCS for preeclampsia with severe features based on thrombocytopenia. She was admitted to antepartum on  to rule-out preE with SF, and was found to have thrombocytopenia. Delivery was strongly advised at that time, but the patient desired expectant management with trending her platelets. On 8/3, her platelets were 93, and therefore CS was strongly recommended. Pregnancy c/b severe FGR, breech presentation, unicornuate uterus, fibroids, GDMA1, and rubella NI.    # Preeclampsia with severe features (platelets)  - BP: mild range  - Continue close monitoring and treat sustained SR BP systolic >160 or diastolic >110 with IV antihypertensives  - Meds:  D#2 Nifedipine 30 mg XL daily  - Symptoms: HA and N/V improving, continue to monitor.  - UOP: Adequate, continue strict I/O's as above.  - HELLP labs:    - plt 103 > 104 >102 > 93   - Otherwise normal, repeat labs pending  - Magnesium sulfate: 4g loading dose (1130) > 2g/hr > held due to dizziness (1530) > Mg level 4.2 > Mg restarted (1620)   - Continue for 24h postpartum   - q4h clinical mag checks  - No evidence of magnesium toxicity on exam    #Postpartum Care  - Continue routine postpartum management    Yasmine Dodd MD, PGY-2  12:59 AM  [facility] Obstetrics & Gynecology Residency    Note entry delayed due to urgent patient care tasks.

## 2022-08-04 NOTE — PLAN OF CARE
2115 Patient complaining of headache and experiencing N/V.Denies visual disturbance or epigastric pain. Unable to open eyes secondary to headache. Recent dose of Tylenol and senna noted within emesis. Dr De La Torre informed of patient condition. IV benadryl and Reglan ordered.     2215 Patient states she feels much better. Able to open eyes.

## 2022-08-04 NOTE — PROGRESS NOTES
Postpartum Magnesium Check Note     S: Patient is feeling well. She had a headache and nausea earlier, which have both resolved.  Denies headache, visual changes, chest pain, shortness of breath, RUQ/epigastric pain, nausea, or vomiting.    O:  Patient Vitals for the past 4 hrs:   BP Temp Temp src Pulse Resp SpO2   22 2350 (!) 153/108 98.4  F (36.9  C) Oral 88 16 98 %     Gen: Well-appearing  CV: RRR, no murmurs  Pulm: CTAB, no wheezes or crackles  Abd: Fundus is below the umbilicus, firm and appropriately tender. Soft, nondistended abdomen.  Incision: clean, dry, intact without surrounding erythema/fluctance.  Ext: 1+ LE edema, 3+ biceps/brachioradialis reflexes, 1 beat of clonus b/l    I/O last 3 completed shifts:  In: 1776 [P.O.:700; I.V.:1076]  Out: 4231 [Urine:2923; Emesis/NG output:800; Blood:508]    Wt Readings from Last 2 Encounters:   22 80.4 kg (177 lb 3.2 oz)   22 80.7 kg (178 lb)      UOP: 1350 ml since 0000    Recent Labs   Lab 22  0827 22  0049 22  1854   HGB 11.4* 11.4* 11.7   PLT 93* 102* 104*   CR 0.49* 0.45* 0.49*   AST 15 27 16   ALT 17 25 18       A/P:  Gil Chaudhari is a 32 year old  POD#1 s/p PLTCS for preeclampsia with severe features based on thrombocytopenia. She was admitted to antepartum on  to rule-out preE with SF, and was found to have thrombocytopenia. Delivery was strongly advised at that time, but the patient desired expectant management with trending her platelets. On 8/3, her platelets were 93, and therefore CS was strongly recommended. Pregnancy c/b severe FGR, breech presentation, unicornuate uterus, fibroids, GDMA1, and rubella NI.    # Preeclampsia with severe features (platelets)  - BP: high mild range  - Continue close monitoring and treat sustained SR BP systolic >160 or diastolic >110 with IV antihypertensives  - Meds: D#2 Nifedipine 30 mg XL daily  - Symptoms: None, continue to monitor.  - UOP: Adequate, continue strict I/O's as  above.  - HELLP labs:    - plt 103 > 104 >102 > 93   - Otherwise normal, repeat labs pending  - Magnesium sulfate: 4g loading dose (1130) > 2g/hr > held due to dizziness (1530) > Mg level 4.2 > Mg restarted (1620)   - Continue for 24h postpartum   - q4h clinical mag checks  - No evidence of magnesium toxicity on exam    #Postpartum Care  - Continue routine postpartum management  - Rh+, Rubella immune  - Pain: Continue ibuprofen, tylenol, oxy PRN  - Heme: Hgb 11.4 >  (TXA) > AM Hgb ordered, asymptomatic  - FEN/GI: regular diet, stool softeners PRN  - : Archer in place  - Contraception: Undecided, considering LARC. 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  - Feeding: Breastfeeding, no concerns  - Baby: doing well in NICU    # GDMA1 - Fasting BG in AM    # Unicornuate uterus - renal US ordered    Dispo: anticipate discharge to home on POD#2-3.  Follow-up in 1 week for BP check and 6 weeks for routine postpartum visit.    Jo-Ann De La Torre MD  OB/GYN, PGY-3  08/04/2022, 2:51 AM

## 2022-08-04 NOTE — LACTATION NOTE
"This note was copied from a baby's chart.  D:  I met with Gil on [facility], Kristi is her first baby.  Her medical history includes diagnoses of asthma and uterine fibroids, she is currently taking nifedipine (Hale L2). She has no history of breast/chest surgery or trauma.  She has already started to pump getting drops. I pumped with her as we talked, followed by hand expression.  Her 24mm flanges may be slightly large; I left her a 21mm set of flanges at NICU bedside. She had a bit of pain on right initially with pumping; we moved flange to ensure nipple in center.   I:  I gave her a folder of introductory materials and went over pumping guidelines.  I reviewed physiology of colostrum and milk production, pumping guidelines, and I gave her a log and encouraged her to use it.   I explained how to access the videos \"Hand Expression\" and \"Maximizing Milk Production\"; as well as other helpful books and websites.   We discussed hands-on pumping techniques and usefulness of a hands-free pumping bra; helped her make a hands free pumping bra with a belly band. .  We discussed skin to skin holding and how to reach your breastfeeding goals.  We talked about medications during breastfeeding.  She verbalized understanding via teachback.  I advised her to call her insurance company about pump coverage.    A:  Mom has information she needs to initiate her supply.   P: Will continue to provide lactation support.    DENIS Joe, RN, IBCLC            "

## 2022-08-04 NOTE — OP NOTE
[facility]  Full Operative Progress Note     Name: Gil Chaudhari  MRN: [id number]  : 1990  Date of Surgery: 2022     Pre-operative Diagnosis:  - IUP @ 35w4d  - Breech Presentation  - Preeclampsia with SF  - Severe FGR  - GDMA1  - Unicornuate uterus   - Uterine Fibroids     Post-operative Diagnosis:  - Same as above  - Postpartum s/p delivery of viable female infant       Procedure(s):   - Primary low transverse  section with double layer uterine closure via Pfannenstiel skin incision     Surgeon:  - Mary Pulliam MD     Assistants:  - Mylene Markham MD, PGY2  - Roseline Byrd, MS4     Anesthesia: Spinal  QBL: 508 mL  UOP: 450 mL clear urine  Fluids: 700 mL crystalloid,   Additional Medications: 2g Ancef preop, 1 gram TXA  Specimens: Cord blood, placenta, cord gases   Complications: None apparent     Indications: Gil Chaudhari is a 32 year old  female at 35w4d admitted for fetal growth restriction with 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 Platelets dropped to 93 this morning and starting on magnesium. With continuing decreasing platelets and a gestation greater than 34 weeks it was recommended by maternal fetal medicine to proceed with primary  section. The risks, benefits, and alternatives of  section were discussed with the patient, and she agreed to proceed.     Findings:   - Viable female infant delivered breech  - APGARs 7 and 9. 1720g   - Cord gasses: pH arterial 7.27, BE -2.1  - Clear amniotic fluid, Placenta with large clot noted, otherwise 3 vessel cord  - Unicornuate uterus with normal left ovary and fallopian tube. 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    - Surgical  sites noted to be hemostatic at the end of case.        Procedure Details:   The patient was brought to the OR, where adequate spinal anesthesia was administered.  She was placed in the dorsal supine position with a slight leftward tilt. She was prepped and draped in the usual sterile fashion. A surgical time out was performed. A pfannenstiel skin incision was made with the scalpel, and carried down to the underlying fascia with sharp and blunt dissection. The fascia was incised in the midline, and the incision was extended laterally bluntly. The superior aspect of the fascia was grasped with the Kocher clamps and dissected off of the underlying rectus muscles with blunt and sharp dissection. Attention was then turned to the inferior aspect of the fascia, which was similarly dissected off of the underlying rectus muscles bluntly. The rectus muscles were  in the midline, and the peritoneum was entered bluntly, and the opening was extended with digital pressure. Evaluation of the uterus, remarkable for known unicornuate uterus, with 4 pedunculated fibroids on the right lateral aspect of the uterus. Rudimentary horn on right also present. The bladder blade was placed.  A transverse hysterotomy was made with the scalpel in the lower uterine segment, being mindful to keep a distance away from right lateral fibroids. The incision was extended with digital pressure. The infant was noted to be breech and was delivered using normal breech maneuvers, atraumatically. The shoulders delivered easily. The cord was doubly clamped and cut, and the infant was handed off to the awaiting NICU staff. A segment of cord was cut and sent to lab for cord gases. The placenta was delivered with gentle traction on the umbilical cord and uterine massage. The uterus was exteriorized and cleared of all clots and debris. Uterine tone was noted to be adequate with 20 units of pitocin given through the running IV and uterine massage. Due  to known thrombocytopenia, 1 gram tranexamic acid was administered for prophylaxis. The hysterotomy was closed with a running locked suture of 0 Vicryl.  The hysterotomy was then imbricated using an 0 vicryl suture. The hysterotomy was noted to be hemostatic. The posterior cul-de-sac was irrigated and cleared of all clots and debris. The uterus was returned to the abdomen. The pericolic gutters were irrigated and cleared of all clots and debris. The hysterotomy was reexamined and noted to be hemostatic. The fascia and rectus muscles were examined and areas of oozing were controlled with electrocautery. The fascia was closed with a running 0 Vicryl suture. The subcutaneous tissue was irrigated and areas of oozing were controlled with electrocautery. The skin was closed with 4-0 vicryl and covered with steri strips and a sterile dressing.    All sponge, needle, and instrument counts were correct. The patient tolerated the procedure well, and was transferred to recovery in stable condition. Dr. Pulliam was present and scrubbed for the entirety of the procedure.     Mylene Markham MD  OB/GYN Resident, PGY-2    Staff MD Note  I was present and scrubbed for the entire procedure noted above.  I agree with the description above and any necessary changes have been made by me.  Mary Pulliam MD

## 2022-08-04 NOTE — PLAN OF CARE
BP's elevated, other vital signs stable. Postpartum assessment WDL. Incision clean, dry, intact with dressing in place. Pain controlled with Tylenol and Oxycodone. Patient ambulating and voiding independently. Patient passing gas has not had bowel movement. Patient pumping independently and visiting baby in NICU. Will continue with current plan of care.    Goal Outcome Evaluation:    Plan of Care Reviewed With: patient     Overall Patient Progress: improving

## 2022-08-05 LAB
GLUCOSE BLDC GLUCOMTR-MCNC: 85 MG/DL (ref 70–99)
PLATELET # BLD AUTO: 108 10E3/UL (ref 150–450)

## 2022-08-05 PROCEDURE — 250N000013 HC RX MED GY IP 250 OP 250 PS 637: Performed by: STUDENT IN AN ORGANIZED HEALTH CARE EDUCATION/TRAINING PROGRAM

## 2022-08-05 PROCEDURE — 120N000002 HC R&B MED SURG/OB UMMC

## 2022-08-05 PROCEDURE — 85049 AUTOMATED PLATELET COUNT: CPT | Performed by: STUDENT IN AN ORGANIZED HEALTH CARE EDUCATION/TRAINING PROGRAM

## 2022-08-05 PROCEDURE — 250N000013 HC RX MED GY IP 250 OP 250 PS 637

## 2022-08-05 PROCEDURE — 36415 COLL VENOUS BLD VENIPUNCTURE: CPT | Performed by: STUDENT IN AN ORGANIZED HEALTH CARE EDUCATION/TRAINING PROGRAM

## 2022-08-05 RX ORDER — ACETAMINOPHEN 325 MG/1
650 TABLET ORAL EVERY 6 HOURS PRN
Qty: 100 TABLET | Refills: 0 | Status: SHIPPED | OUTPATIENT
Start: 2022-08-05

## 2022-08-05 RX ORDER — AMOXICILLIN 250 MG
1 CAPSULE ORAL DAILY
Qty: 100 TABLET | Refills: 0 | Status: SHIPPED | OUTPATIENT
Start: 2022-08-05 | End: 2022-09-21

## 2022-08-05 RX ORDER — IBUPROFEN 600 MG/1
600 TABLET, FILM COATED ORAL EVERY 6 HOURS PRN
Qty: 60 TABLET | Refills: 0 | Status: SHIPPED | OUTPATIENT
Start: 2022-08-05 | End: 2022-09-21

## 2022-08-05 RX ADMIN — ACETAMINOPHEN 975 MG: 325 TABLET, FILM COATED ORAL at 04:34

## 2022-08-05 RX ADMIN — SENNOSIDES AND DOCUSATE SODIUM 2 TABLET: 50; 8.6 TABLET ORAL at 20:43

## 2022-08-05 RX ADMIN — ACETAMINOPHEN 975 MG: 325 TABLET, FILM COATED ORAL at 16:39

## 2022-08-05 RX ADMIN — ACETAMINOPHEN 975 MG: 325 TABLET, FILM COATED ORAL at 10:42

## 2022-08-05 RX ADMIN — ACETAMINOPHEN 975 MG: 325 TABLET, FILM COATED ORAL at 22:30

## 2022-08-05 RX ADMIN — SENNOSIDES AND DOCUSATE SODIUM 2 TABLET: 50; 8.6 TABLET ORAL at 08:27

## 2022-08-05 RX ADMIN — NIFEDIPINE 60 MG: 30 TABLET, FILM COATED, EXTENDED RELEASE ORAL at 20:43

## 2022-08-05 ASSESSMENT — ACTIVITIES OF DAILY LIVING (ADL)
ADLS_ACUITY_SCORE: 18

## 2022-08-05 NOTE — PROGRESS NOTES
Postpartum Progress Note     S: She is resting comfortably in bed this morning.  States that her  pain is still present, particularly with movement, but is tolerable with the medications. Tolerating PO intake.  Lochia present and similar to peak menses.  She has been voiding spontaneously and passing gas. She denies headache, changes in vision, nausea/vomiting, chest pain, shortness of breath, RUQ pain, or worsening edema.  Plans to breastfeed. No other questions or concerns today.    O:  Patient Vitals for the past 12 hrs:   BP Temp Temp src Pulse Resp   22 0429 (!) 128/91 -- -- 99 --   22 2359 (!) 137/96 -- -- 82 --   22 (!) 145/98 98.8  F (37.1  C) Oral -- 16     Gen: Well-appearing  CV: Well-perfused  Pulm: Breathing comfortably on room air  Abd: Fundus is 2 cm below the umbilicus, firm and appropriately tender. Soft, nondistended abdomen.  Incision: clean, dry, intact without surrounding erythema/fluctance.  Steri-Strips in place.  Extremities: 2+ edema bilaterally    Vitals:    22 0505 22 0940 22 1205   Weight: 81.4 kg (179 lb 8 oz) 80.4 kg (177 lb 3.2 oz) 79.4 kg (175 lb 1.6 oz)       Recent Labs   Lab 22  0643 22  0827 22  0049   HGB 11.4* 11.4* 11.4*   * 93* 102*   CR 0.48* 0.49* 0.45*   AST 27 15 27   ALT 20 17 25       A/P:  Gil Chaudhari is a 32 year old  POD#2 s/p PLTCS for preeclampsia with severe features based on thrombocytopenia. She was admitted to antepartum on  to rule-out preE with SF, and was found to have thrombocytopenia. Delivery was strongly advised at that time, but the patient desired expectant management with trending her platelets. On 8/3, her platelets were 93, and therefore CS was strongly recommended. Pregnancy c/b severe FGR, breech presentation, unicornuate uterus, fibroids, GDMA1, and rubella NI.    # Preeclampsia with severe features (platelets)  - BP: Currently low mild range  - Continue close  monitoring and treat sustained SR BP systolic >160 or diastolic >110 with IV antihypertensives  - Meds: D#1 Nifedipine 60 mg XL daily (PM dosing)  - Symptoms: None, continue to monitor.  - UOP: Adequate, continue strict I/O's as above.  - HELLP labs:    - plt 103 > 104 >102 > 93 > 100  - s/p Mg until 24h postpartum    #Postpartum Care  - Continue routine postpartum management  - Rh+, Rubella non immune  - Pain: Continue ibuprofen, tylenol, oxy PRN  - Heme: Hgb 11.4 >  (TXA) > 11.4, asymptomatic  - FEN/GI: regular diet, stool softeners PRN  - : Voiding spontaneously  - Contraception: Undecided, considering LARC. Had contacted an OBGYN at [place] to discuss possible surgery for her rudimentary horn of her unicornuate uterus.  - Feeding: Breastfeeding, no concerns  - Baby: doing well in NICU    # Itching - resolved after Benadryl    # GDMA1 - Fasting BG 86.  Plan to repeat 2-hour GTT at 6-week postpartum visit.    # Unicornuate uterus - renal US (8/4):moderate R hydronephrosis, normal kidneys    Dispo: anticipate discharge to home on POD#3 pending blood pressure control  Follow-up in 1 week for BP check and 6 weeks for routine postpartum visit.    Jo-Ann De La Torre MD  OB/GYN, PGY-3  08/05/2022, 6:42 AM     Physician Attestation   I, Anna Dotson MD, personally examined and evaluated this patient.  I discussed the patient with the resident/fellow and care team, and agree with the assessment and plan of care as documented in the note of 8/05/22.      I personally reviewed vital signs, medications and labs.    Key findings: POD2 , preeclampsia with severe features, blood pressures high but not on severity range, no CNS irritability symptoms, continue to monitor blood pressures and titrate blood pressure medication as needed. Platelets this am 108, up trending.  Anna Dotson MD  Date of Service (when I saw the patient): 08/05/22

## 2022-08-05 NOTE — PLAN OF CARE
Goal Outcome Evaluation:    Plan of Care Reviewed With: patient     Overall Patient Progress: improving     BP elevated but within parameters, improving after one time dose nifedipine. Voiding spontaneously without difficulty. Enc to pump more frequently. Denies HA, RUQ pain, visual dx, or worsening edema. Pt very worried that she will not feel symptoms as she reports she has felt fine this whole time with high bp. DTR +2 no clonus. Pt reports pain control to be marginal, but declines interventions, knows other options are available. Pt up to walk, walking very cautiously, requests to use IV pole for support, discussed with pt if she feels like she needs an assistive device a walker might be a safer option to discuss with provider. Plan to continue supportive cares.

## 2022-08-05 NOTE — PLAN OF CARE
Goal Outcome Evaluation:    Plan of Care Reviewed With: patient     Overall Patient Progress: improving    BPs 130/90s today. Pt denies HA, vision changes & epigastric pain. +2 reflexes & no clonus bilaterally. Pt up in room independently. Pt c/o incisional pain when up moving. Taking scheduled tylenol, declines oxycodone.Pt's mother here & supportive.

## 2022-08-05 NOTE — PLAN OF CARE
Pt denies HA, chest pain, nausea, vision change, RUQ pain. Denies lightheadedness/dizziness. Fundus midline, firm, and U/1. Scant lochia. Incision approximated with adhesive strips; no drainage noted. Pain adequately managed with tylenol. Ambulating independently, tolerating regular diet, and voiding without difficulty. Pumping independently with encouragement. Visiting  in NICU. Continue with plan of care.

## 2022-08-05 NOTE — PROVIDER NOTIFICATION
08/04/22 2018   Provider Notification   Provider Name/Title Dr. Dodd   Method of Notification Electronic Page   Request Evaluate-Remote   Notification Reason Status Update   Fyi page bp 411/60

## 2022-08-06 VITALS
HEIGHT: 65 IN | HEART RATE: 101 BPM | WEIGHT: 169.6 LBS | RESPIRATION RATE: 18 BRPM | BODY MASS INDEX: 28.26 KG/M2 | OXYGEN SATURATION: 98 % | TEMPERATURE: 98.5 F | SYSTOLIC BLOOD PRESSURE: 138 MMHG | DIASTOLIC BLOOD PRESSURE: 94 MMHG

## 2022-08-06 LAB — GLUCOSE BLDC GLUCOMTR-MCNC: 82 MG/DL (ref 70–99)

## 2022-08-06 PROCEDURE — 250N000013 HC RX MED GY IP 250 OP 250 PS 637

## 2022-08-06 PROCEDURE — 90471 IMMUNIZATION ADMIN: CPT | Performed by: STUDENT IN AN ORGANIZED HEALTH CARE EDUCATION/TRAINING PROGRAM

## 2022-08-06 PROCEDURE — 90707 MMR VACCINE SC: CPT | Performed by: STUDENT IN AN ORGANIZED HEALTH CARE EDUCATION/TRAINING PROGRAM

## 2022-08-06 PROCEDURE — 250N000013 HC RX MED GY IP 250 OP 250 PS 637: Performed by: STUDENT IN AN ORGANIZED HEALTH CARE EDUCATION/TRAINING PROGRAM

## 2022-08-06 PROCEDURE — 250N000011 HC RX IP 250 OP 636: Performed by: STUDENT IN AN ORGANIZED HEALTH CARE EDUCATION/TRAINING PROGRAM

## 2022-08-06 RX ORDER — OXYCODONE HYDROCHLORIDE 5 MG/1
5 TABLET ORAL EVERY 6 HOURS PRN
Qty: 6 TABLET | Refills: 0 | Status: SHIPPED | OUTPATIENT
Start: 2022-08-06 | End: 2022-09-21

## 2022-08-06 RX ADMIN — SENNOSIDES AND DOCUSATE SODIUM 2 TABLET: 50; 8.6 TABLET ORAL at 07:40

## 2022-08-06 RX ADMIN — ACETAMINOPHEN 975 MG: 325 TABLET, FILM COATED ORAL at 10:31

## 2022-08-06 RX ADMIN — ACETAMINOPHEN 975 MG: 325 TABLET, FILM COATED ORAL at 17:38

## 2022-08-06 RX ADMIN — NIFEDIPINE 60 MG: 30 TABLET, FILM COATED, EXTENDED RELEASE ORAL at 07:40

## 2022-08-06 RX ADMIN — ACETAMINOPHEN 975 MG: 325 TABLET, FILM COATED ORAL at 04:50

## 2022-08-06 RX ADMIN — MEASLES, MUMPS, AND RUBELLA VIRUS VACCINE LIVE 0.5 ML: 1000; 12500; 1000 INJECTION, POWDER, LYOPHILIZED, FOR SUSPENSION SUBCUTANEOUS at 17:39

## 2022-08-06 ASSESSMENT — ACTIVITIES OF DAILY LIVING (ADL)
ADLS_ACUITY_SCORE: 18

## 2022-08-06 NOTE — PROVIDER NOTIFICATION
"   08/06/22 1218   Provider Notification   Provider Name/Title Dr SHALOM Rosales   Method of Notification Electronic Page   Request Evaluate-Remote   Notification Reason Other   Pt has discharge order placed, but just submitted her EDS.  Score=19, question #10=1, pt states \"not current thoughts but thoughts during pregnancy\".  SW consult placed, guessing this needs to occur before discharge?    Update:  Dr Rosales stated no need to hold discharge, SW is following pt due to infant in NICU, will contact SW with this information.      Update:  On-call Roselia JENSEN will call to talk with pt today and will also communicate this EDS score information to staff MIKEY Hawley to continue to follow-up with pt and address this as well.    "

## 2022-08-06 NOTE — PLAN OF CARE
"Goal Outcome Evaluation:  7796-0261:  VSS, except for elevated BP, but not severe range and postpartum assessments WDL.  Pt denies signs/symptoms of preeclampsia.  Up ad javed with steady gait and independent with cares.  Pumping independently for infant in NICU and getting drops of colostrum to collect on swabs.  Pain managed with tylenol per MAR.  Incisional steristrips intact.  Mother present and supportive.  Birth certificate done.  Videos watched.  EDS=19, question #10=1, but pt states \"not current feelings\", MD and MIKEY notified, see SW note.  Reviewed discharge medications.  Reviewed follow-up for appointments for 1 week BP check and 6 weeks postpartum check.  Reviewed home BP monitor and when to call clinic.  Reviewed discharge instructions and answered all questions.  Discharged home with all belongings at 1800.                     "

## 2022-08-06 NOTE — DISCHARGE INSTRUCTIONS
Postop  Birth Instructions    Activity     Do not lift more than 10 pounds for 6 weeks after surgery.  Ask family and friends for help when you need it.  No driving until you have stopped taking your pain medications (usually two weeks after surgery).  No heavy exercise or activity for 6 weeks.  Don't do anything that will put a strain on your surgery site.  Don't strain when using the toilet.  Your care team may prescribe a stool softener if you have problems with your bowel movements.     To care for your incision:     Keep the incision clean and dry.  Do not soak your incision in water. No swimming or hot tubs until it has fully healed. You may soak in the bathtub if the water level is below your incision.  Do not use peroxide, gel, cream, lotion, or ointment on your incision.  Adjust your clothes to avoid pressure on your surgery site (check the elastic in your underwear for example).     You may see a small amount of clear or pink drainage and this is normal.  Check with your health care provider:     If the drainage increases or has an odor.  If the incision reddens, you have swelling, or develop a rash.  If you have increased pain and the medicine we prescribed doesn't help.  If you have a fever above 100.4 F (38 C) with or without chills when placing thermometer under your tongue.   The area around your incision (surgery wound), will feel numb.  This is normal. The numbness should go away in less than a year.     Keep your hands clean:  Always wash your hands before touching your incision (surgery wound). This helps reduce your risk of infection. If your hands aren't dirty, you may use an alcohol hand-rub to clean your hands. Keep your nails clean and short.                    Call your healthcare provider if you have any of these symptoms:     You soak a sanitary pad with blood within 1 hour, or you see blood clots larger than a golf ball.  Bleeding that lasts more than 6 weeks.  Vaginal discharge  that smells bad.  Severe pain, cramping or tenderness in your lower belly area.  A need to urinate more frequently (use the toilet more often), more urgently (use the toilet very quickly), or it burns when you urinate.  Nausea and vomiting.  Redness, swelling or pain around a vein in your leg.  Problems breastfeeding or a red or painful area on your breast.  Chest pain and cough or are gasping for air.  Problems with coping with sadness, anxiety or depression. If you have concerns about hurting yourself or the baby, call your provider immediately.    You have questions or concerns after you return home.     Preeclampsia   Call your doctor right away if you have any of the following:  - Edema (swelling) in your face or hands  - Rapid weight gain-about 1 pound or more in a day  - Headache  - Abdominal pain on your right side  - Vision problems (flashes or spots)  - You have questions or concerns once you return home.

## 2022-08-06 NOTE — PLAN OF CARE
Goal Outcome Evaluation:      VSS. Postpartum assessment WNL. C/o pain with activity, taking tylenol. Reminded pt she has oxycodone available as well. Denies s/s preE. DTR WNL, clonus absent. Encouraged pt to pump every 2-3 hours, pumped x1 this morning. Encouraged pt to shower today. Encouraged pt to watch educational videos and complete birth certificate and EDS. Mother present and attentive.

## 2022-08-06 NOTE — CONSULTS
On this date, SW consulted due to elevated EDS score (19). Score indicates presenting needs and monitoring of mental health symptoms. MIKEY spoke with Gil via phone. Gil denied any current thoughts or current SI or SIB. Gil confirmed that she feels well supported and wants to leave. Gil endorsed feeling physical pain but does not indicate cause for further concern with mental. MIKEY inquired about checking in with primary  on going surrounding mental health and Gil agreed that she thinks that would be beneficial.     SW to close consult. SW to update and provide handoff to primary weekday .           KP Gomes, CORRIE, [facility]  Pediatric Float    Office: [phone]  Email: [email or web address]  After hours and weekend pager: [phone]  *NO LETTER*

## 2022-08-06 NOTE — PROGRESS NOTES
Postpartum Progress Note   POD#3    S: She is resting comfortably in bed this morning. Pain has improved. Tolerating PO intake.  Lochia present and similar to peak menses.  She has been voiding spontaneously and passing gas. She denies headache, changes in vision, nausea/vomiting, chest pain, shortness of breath, RUQ pain, or worsening edema.  Plans to breastfeed. No other questions or concerns today. Feels excited to go home.    O:  Patient Vitals for the past 12 hrs:   BP Temp Temp src Pulse Resp Weight   22 0738 130/88 98.5  F (36.9  C) Oral 77 16 --   22 0535 -- -- -- -- -- 76.9 kg (169 lb 9.6 oz)   22 0355 (!) 133/97 97.9  F (36.6  C) Oral 78 18 --   22 0000 (!) 132/97 98.3  F (36.8  C) Oral 74 18 --     Gen: Well-appearing  CV: Well-perfused  Pulm: Breathing comfortably on room air  Abd: Fundus is 2 cm below the umbilicus, firm and appropriately tender. Soft, nondistended abdomen.  Incision: clean, dry, intact without surrounding erythema/fluctance.  Steri-Strips in place.  Extremities: 2+ edema bilaterally    Vitals:    22 1205 22 0655 22 0535   Weight: 79.4 kg (175 lb 1.6 oz) 78.2 kg (172 lb 6.4 oz) 76.9 kg (169 lb 9.6 oz)       Recent Labs   Lab 22  0753 22  0643 22  0827 22  0049   HGB  --  11.4* 11.4* 11.4*   * 100* 93* 102*   CR  --  0.48* 0.49* 0.45*   AST  --  27 15 27   ALT  --  20 17 25       A/P:  Gil Chaudhari is a 32 year old  POD#3 s/p PLTCS for preeclampsia with severe features based on thrombocytopenia. She was admitted to antepartum on  to rule-out preE with SF, and was found to have thrombocytopenia. Delivery was strongly advised at that time, but the patient desired expectant management with trending her platelets. On 8/3, her platelets were 93, and therefore CS was strongly recommended. Pregnancy c/b severe FGR, breech presentation, unicornuate uterus, fibroids, GDMA1, and rubella NI.    # Preeclampsia with  severe features (platelets)  - BP: Currently low mild range, 1 high mild range in past 24h  - Continue close monitoring and treat sustained SR BP systolic >160 or diastolic >110 with IV antihypertensives  - Meds: D#2 Nifedipine 60 mg XL daily (PM dosing)  - Symptoms: None, continue to monitor.  - UOP: Adequate, continue strict I/O's as above.  - HELLP labs:    - plt 103 > 104 >102 > 93 > 100 >108  - s/p Mg until 24h postpartum    #Postpartum Care  - Continue routine postpartum management  - Rh+, Rubella non immune  - Pain: Continue ibuprofen, tylenol, oxy PRN  - Heme: Hgb 11.4 >  (TXA) > 11.4, asymptomatic  - FEN/GI: regular diet, stool softeners PRN  - : Voiding spontaneously  - Contraception: Undecided, considering LARC. Had contacted an OBGYN at [place] to discuss possible surgery for her rudimentary horn of her unicornuate uterus.  - Feeding: Breastfeeding, no concerns  - Baby: doing well in NICU    # Itching - resolved after Benadryl    # GDMA1 - Fasting BG 86.  Plan to repeat 2-hour GTT at 6-week postpartum visit.    # Unicornuate uterus - renal US (8/4):moderate R hydronephrosis, normal kidneys    Dispo: Discharge to home today.  Follow-up in 1 week for BP check and 6 weeks for routine postpartum visit.    Jo-Ann De La Torre MD  OB/GYN, PGY-3  08/06/2022, 7:30 AM     Appreciate note by Dr. De La Torre. Patient has been seen and examined by me separate from the resident, agree with above note. Has not been on ibuprofen due to thrombocytopenia, ok to start now, already has discharge med. Discharge instructions reviewed.     Sudha Rosales MD  11:53 AM

## 2022-08-06 NOTE — PLAN OF CARE
Goal Outcome Evaluation: improving  Data: VSS, postpartum assessments WNL. Except BP. Pt is voiding without difficulty, up ad javed, passing gas, eating and drinking normally. Incisions is open to the air. Lochia is scant, no s/s infection.Complaining about pain when she get up, taking Tylenol for pain. Reports good pain management. Education provided on. Pt is agreeable with her plan of care. Support person present. Will continue with plan of care.

## 2022-08-10 ENCOUNTER — LACTATION ENCOUNTER (OUTPATIENT)
Age: 32
End: 2022-08-10

## 2022-08-10 ENCOUNTER — TELEPHONE (OUTPATIENT)
Dept: OBGYN | Facility: CLINIC | Age: 32
End: 2022-08-10

## 2022-08-10 DIAGNOSIS — O14.13 PREECLAMPSIA, SEVERE, THIRD TRIMESTER: Primary | ICD-10-CM

## 2022-08-10 RX ORDER — NIFEDIPINE 30 MG
30 TABLET, EXTENDED RELEASE ORAL DAILY
Qty: 30 TABLET | Refills: 1 | Status: SHIPPED | OUTPATIENT
Start: 2022-08-10 | End: 2022-09-21

## 2022-08-10 NOTE — TELEPHONE ENCOUNTER
Discussed BP with Dr Banks.  Pt is to stop the 60 mg dose and start a 30 mg dose.  Pt verbalized understanding and agreed to the plan.

## 2022-08-10 NOTE — LACTATION NOTE
This note was copied from a baby's chart.  D: Met with Gil to give her copy of rental pump paperwork. She said her supply is minimal puddles, added together is 10ml in past day. She is trying to pump more often but at times goes 6 hours due to need for rest and self care. Lower extremity edema seems to be improving. She asked about herbs; we discussed use of herbs and I gave her a handout. Provided support and encouraged her to keep pumping as able to meet her feeding plan goals. Discussed option of provider/IBCLC follow-up who could further assess and consider galactogogue prescriptions and/or labs. Pump frequency is still unconfirmed, despite promotion of logging. Encouraged skin to skin and rooming in with baby as able.   A: Alteration/delay in lactogenesis II possibly related to inconsistent or infrequent pumping or other maternal factors.   P: Will continue to provide lactation support. Will review risk factors and give contact information for follow-up care.  Keren Lopez, RNC, IBCLC

## 2022-08-11 ENCOUNTER — TELEPHONE (OUTPATIENT)
Dept: OBGYN | Facility: CLINIC | Age: 32
End: 2022-08-11

## 2022-08-11 NOTE — TELEPHONE ENCOUNTER
----- Message from Rachel Wheeler RN sent at 8/11/2022  8:32 AM CDT -----  Regarding: missed our call yesterday  Missed our call yesterday

## 2022-08-15 ENCOUNTER — ALLIED HEALTH/NURSE VISIT (OUTPATIENT)
Dept: OBGYN | Facility: CLINIC | Age: 32
End: 2022-08-15
Attending: OBSTETRICS & GYNECOLOGY
Payer: COMMERCIAL

## 2022-08-15 VITALS
SYSTOLIC BLOOD PRESSURE: 118 MMHG | WEIGHT: 160 LBS | DIASTOLIC BLOOD PRESSURE: 84 MMHG | HEART RATE: 99 BPM | BODY MASS INDEX: 26.63 KG/M2

## 2022-08-15 PROCEDURE — 99207 PR NO BILLABLE SERVICE THIS VISIT: CPT

## 2022-08-15 NOTE — NURSING NOTE
Patient is here for a blood pressure check. Patient is on Nifedipine 30mg daily.  Patients incision looks normal and her EPDS screening was a 9.    Patient instructed to make a follow up appointment on her way out.    Patient verbalized understanding.

## 2022-08-16 ENCOUNTER — TELEPHONE (OUTPATIENT)
Dept: OBGYN | Facility: CLINIC | Age: 32
End: 2022-08-16

## 2022-08-16 NOTE — TELEPHONE ENCOUNTER
----- Message from Mary Pulliam MD sent at 8/15/2022  4:42 PM CDT -----  She can stop taking the nifedipine.  She should check her BP daily for the next week and if it rises more than 150/100 she should notify us.  Thanks, Dr. Pulliam  ----- Message -----  From: Rachel Wheeler RN  Sent: 8/15/2022   4:34 PM CDT  To: Mary Pulliam MD    This patient is on Nifedipine 30mg and came in for a blood pressure check today. Her blood pressure was 116/83, 111/79, 127/84. Patient forgot to take her medication today and is wondering does she still need to continue taking it.  I instructed patient to make sure to take her blood pressure before taking her medication and I will let her know if you would like her to stop taking it. Please let me know when you can.

## 2022-08-16 NOTE — TELEPHONE ENCOUNTER
Sent patient a [employer] message to let her know that Dr. Pulliam stated that she can stop taking her Nifedipine and to continue to check her blood pressures for the next week and if she has any blood pressures at 150/100 to call the clinic at [phone].

## 2022-08-23 ENCOUNTER — TELEPHONE (OUTPATIENT)
Dept: OBGYN | Facility: CLINIC | Age: 32
End: 2022-08-23

## 2022-08-23 NOTE — TELEPHONE ENCOUNTER
----- Message from Rachel Wheeler RN sent at 2022 12:12 PM CDT -----  Regarding: incision still hurts and is in pain  Patient had a  on 8-3 and is still in a lot of pain and incision still hurts.

## 2022-08-23 NOTE — TELEPHONE ENCOUNTER
"S-(situation): patient left a message stating she was still having incision pain from her  on 8-3-22    B-(background): patient delivered via  on 8-3-22    A-(assessment): inquired about patient symptoms:  1. What type of pain are you having and where-\"Pain at my incision and on the left side as well\"  2. What are you taking to help with the pain-\"Tylenol 325mg and Oxy 5mg when needed.  3. Are you getting enough rest-\"Yes\"  4. Do you have stairs at home and if so are you going up and down them a lot-\"I do have stairs and I only go up and down 1-2 times per day\"  5. Is your incision red, warm to the touch or have any drainage that is a different color or running a fever-\"No\"    R-(recommendations): instructed patient to:  1. Take 975mg of Tylenol and Ibuprofen 600mg. Take these medications every 6 hours for pain. For example if you take your Tylenol at 3pm then at 6pm take Ibuprofen, Tylenol at 9pm, Ibuprofen at midnight and so on. This will help to get ahead of your pain and then make it better to walk.  2. It is normal to have more pain on one side than the other due to the doctor was probably standing on that side and tend to tug and pull more. It does take time for the incision to heal.   3. After you take the Tylenol or Ibuprofen wait about an hour and then take a short walk.  4. Make sure to keep hydrated and get enough rest  5. If not feeling better by Friday to please call us back at [phone]    Patient verbalized understanding and was in agreement with the plan.    "

## 2022-09-06 LAB
PATH REPORT.COMMENTS IMP SPEC: NORMAL
PATH REPORT.FINAL DX SPEC: NORMAL
PATH REPORT.GROSS SPEC: NORMAL
PATH REPORT.MICROSCOPIC SPEC OTHER STN: NORMAL
PATH REPORT.RELEVANT HX SPEC: NORMAL
PHOTO IMAGE: NORMAL

## 2022-09-06 PROCEDURE — 88313 SPECIAL STAINS GROUP 2: CPT | Mod: 26 | Performed by: PATHOLOGY

## 2022-09-06 PROCEDURE — 88342 IMHCHEM/IMCYTCHM 1ST ANTB: CPT | Mod: TC

## 2022-09-06 PROCEDURE — 88313 SPECIAL STAINS GROUP 2: CPT | Mod: TC

## 2022-09-08 ENCOUNTER — MEDICAL CORRESPONDENCE (OUTPATIENT)
Dept: HEALTH INFORMATION MANAGEMENT | Facility: CLINIC | Age: 32
End: 2022-09-08

## 2022-09-19 PROCEDURE — 99207 PLACENTA PATH ORDER AND INDICATIONS: CPT | Mod: 26 | Performed by: PATHOLOGY

## 2022-09-19 PROCEDURE — 88342 IMHCHEM/IMCYTCHM 1ST ANTB: CPT

## 2022-09-21 ENCOUNTER — OFFICE VISIT (OUTPATIENT)
Dept: OBGYN | Facility: CLINIC | Age: 32
End: 2022-09-21
Attending: OBSTETRICS & GYNECOLOGY
Payer: COMMERCIAL

## 2022-09-21 VITALS
WEIGHT: 157 LBS | HEART RATE: 98 BPM | BODY MASS INDEX: 26.13 KG/M2 | SYSTOLIC BLOOD PRESSURE: 124 MMHG | DIASTOLIC BLOOD PRESSURE: 85 MMHG

## 2022-09-21 DIAGNOSIS — O14.13 PREECLAMPSIA, SEVERE, THIRD TRIMESTER: Primary | ICD-10-CM

## 2022-09-21 DIAGNOSIS — O43.109: ICD-10-CM

## 2022-09-21 DIAGNOSIS — O36.5990 PREGNANCY AFFECTED BY FETAL GROWTH RESTRICTION: ICD-10-CM

## 2022-09-21 DIAGNOSIS — Z31.69 ENCOUNTER FOR PRECONCEPTION CONSULTATION: ICD-10-CM

## 2022-09-21 DIAGNOSIS — M79.632 PAIN OF LEFT FOREARM: Primary | ICD-10-CM

## 2022-09-21 PROCEDURE — G0463 HOSPITAL OUTPT CLINIC VISIT: HCPCS | Performed by: OBSTETRICS & GYNECOLOGY

## 2022-09-21 ASSESSMENT — ANXIETY QUESTIONNAIRES
3. WORRYING TOO MUCH ABOUT DIFFERENT THINGS: SEVERAL DAYS
GAD7 TOTAL SCORE: 3
GAD7 TOTAL SCORE: 3
1. FEELING NERVOUS, ANXIOUS, OR ON EDGE: SEVERAL DAYS
5. BEING SO RESTLESS THAT IT IS HARD TO SIT STILL: NOT AT ALL
2. NOT BEING ABLE TO STOP OR CONTROL WORRYING: NOT AT ALL
7. FEELING AFRAID AS IF SOMETHING AWFUL MIGHT HAPPEN: NOT AT ALL
6. BECOMING EASILY ANNOYED OR IRRITABLE: SEVERAL DAYS

## 2022-09-21 ASSESSMENT — PATIENT HEALTH QUESTIONNAIRE - PHQ9
SUM OF ALL RESPONSES TO PHQ QUESTIONS 1-9: 3
5. POOR APPETITE OR OVEREATING: NOT AT ALL

## 2022-09-21 ASSESSMENT — PAIN SCALES - GENERAL: PAINLEVEL: NO PAIN (0)

## 2022-09-21 NOTE — PROGRESS NOTES
[facility]  OB Postpartum Visit    S:  Gil Chaudhari is a 32 year old  here for a 6-week postpartum check after a primary  for breech presentation in the setting of preeclampsia with severe features and abnormal HELLP labs that were worsening on admission. Her pregnancy was complicated by GDMA1, unicornate uterus, severe FGR, and PreE w/ SF. Her procedure and postpartum course were uncomplicated. She completed 24h of Mg prior to discharge. Her blood pressure since delivery was well controlled on nifedipine 30mg daily, which was discontinued after a normal BP at her 1 week postpartum check.    Since her delivery, she has been doing well. She is breast and bottle feeding her baby. Her baby is home and doing well. She has not yet resumed menses but has started having some spotting. She has had stress incontinence with laughing but otherwise no concerns with bladder or bowel movements. She has not resumed sexual activity and will use condoms once ready. Her mood is appropriate and stable; feels down about 1x per week but not bothersome or impeding her daily activities. She is sleeping without difficulty. No pelvic pain, abnormal vaginal discharge, dysuria. She has occasional incision pain with activity but otherwise thinks her incision is healing well; no discharge, redness, fevers, or tenderness to touch.    The patient notes that she has had persistent left forearm pain and right lower back pain since her . These symptoms are new postpartum. She noticed them right after surgery, and they have since persisted. She feels like she has a pinched nerve in right back and associated pain. The pain is positional and worse with activity. No spontaneous urinary or bowel incontinence. Has some lower extremity weakness, primarily after walking, and unsure if more in her right leg than left. No numbness or tingling in her right lower extremity. For her left forearm pain, she states it hurts 9/10 with sharp  feeling when she tried to move her hand. This is particularly bothersome since it makes it difficult to hold and feed her baby. No overlying skin changes, tenderness to touch, warmth. Denies history of blood clots in the past. No associated weakness.    ROS: 10 point ROS neg other than the symptoms noted above in the HPI.     O:  EXAM:  LMP 2021   General: alert, oriented, sitting comfortably  Psych: normal mood and affect  Incision: well healed, no drainage or erythema    A/P:  32 year old  here for a 6-week postpartum check. She is overall doing well.     Left forearm pain  Right lower back pain and possible radiculopathy  - referral to orthopedic for further evaluation and imaging    Preeclampsia  Discharged on nifedipine 30mg that was discontinued at her 1 week follow-up visit. Blood pressures have remained normal since. No headache, vision changes, RUQ pain, leg tenderness, or other concerns symptoms since delivery.  - no labs or medication indicated     GDMA1  Patient has been checking BG at home. Says postprandial has been high, between 150-160.  - order for 2 GTT today for patient to complete at her convenience    Postpartum Care  - contraception: condoms  - feeding: breast and bottle  - incision: well healed, can resume normal activities  - bowel movements: continue using stool softeners, like Miralax, as needed for constipation and stay well hydrated  - mood: stable, appropriate; discussed that patient should return if her symptoms become more frequent or bothersome  - continue PNV since breastfeeding    Swapna Hartman MD  PGY-1 OBGYN Resident  2022 3:39 PM     [facility] staff:  Appreciate note by Dr. Hartman.  I have seen and examined the patient with the resident. I have reviewed, edited, and agree with the note.        Pili Avila MD, FACOG

## 2022-09-21 NOTE — NURSING NOTE
Chief Complaint   Patient presents with     Post Partum Exam      8/3/2022         SUBJECTIVE:   Gil Chaudhari is here for her 6-week postpartum checkup.     PHQ-9 score:   Hx of Abuse:  No    Delivery Date: 8/3/2022.    Delivering provider:  Mary Pulliam MD.    Type of delivery:  .     Delivery complications: None  Infant gender:  girl, weight 3 pounds 12 oz.  Feeding Method:   and Bottlefed.  Complications reported with feeding:  none, infant thriving .    Bleeding:  Light.  Duration:  .  Menses resumed:  No  Bowel/Urinary problems:  No    Contraception Planned:  condoms  She  has not had intercourse since delivery.       Laura James LPN.

## 2022-09-21 NOTE — LETTER
2022       RE: Gil Chaudhari  [id number] [id number]     Dear Colleague,    Thank you for referring your patient, Gil Chaudhari, to the [facility]. Please see a copy of my visit note below.    [facility]  OB Postpartum Visit    S:  Gil Chaudhari is a 32 year old  here for a 6-week postpartum check after a primary  for breech presentation in the setting of preeclampsia with severe features and abnormal HELLP labs that were worsening on admission. Her pregnancy was complicated by GDMA1, unicornate uterus, severe FGR, and PreE w/ SF. Her procedure and postpartum course were uncomplicated. She completed 24h of Mg prior to discharge. Her blood pressure since delivery was well controlled on nifedipine 30mg daily, which was discontinued after a normal BP at her 1 week postpartum check.    Since her delivery, she has been doing well. She is breast and bottle feeding her baby. Her baby is home and doing well. She has not yet resumed menses but has started having some spotting. She has had stress incontinence with laughing but otherwise no concerns with bladder or bowel movements. She has not resumed sexual activity and will use condoms once ready. Her mood is appropriate and stable; feels down about 1x per week but not bothersome or impeding her daily activities. She is sleeping without difficulty. No pelvic pain, abnormal vaginal discharge, dysuria. She has occasional incision pain with activity but otherwise thinks her incision is healing well; no discharge, redness, fevers, or tenderness to touch.    The patient notes that she has had persistent left forearm pain and right lower back pain since her . These symptoms are new postpartum. She noticed them right after surgery, and they have since persisted. She feels like she has a pinched nerve in right back and associated pain. The pain is  positional and worse with activity. No spontaneous urinary or bowel incontinence. Has some lower extremity weakness, primarily after walking, and unsure if more in her right leg than left. No numbness or tingling in her right lower extremity. For her left forearm pain, she states it hurts 9/10 with sharp feeling when she tried to move her hand. This is particularly bothersome since it makes it difficult to hold and feed her baby. No overlying skin changes, tenderness to touch, warmth. Denies history of blood clots in the past. No associated weakness.    ROS: 10 point ROS neg other than the symptoms noted above in the HPI.     O:  EXAM:  LMP 2021   General: alert, oriented, sitting comfortably  Psych: normal mood and affect  Incision: well healed, no drainage or erythema    A/P:  32 year old  here for a 6-week postpartum check. She is overall doing well.     Left forearm pain  Right lower back pain and possible radiculopathy  - referral to orthopedic for further evaluation and imaging    Preeclampsia  Discharged on nifedipine 30mg that was discontinued at her 1 week follow-up visit. Blood pressures have remained normal since. No headache, vision changes, RUQ pain, leg tenderness, or other concerns symptoms since delivery.  - no labs or medication indicated     GDMA1  Patient has been checking BG at home. Says postprandial has been high, between 150-160.  - order for 2 GTT today for patient to complete at her convenience    Postpartum Care  - contraception: condoms  - feeding: breast and bottle  - incision: well healed, can resume normal activities  - bowel movements: continue using stool softeners, like Miralax, as needed for constipation and stay well hydrated  - mood: stable, appropriate; discussed that patient should return if her symptoms become more frequent or bothersome  - continue PNV since breastfeeding    Swapna Hartman MD  PGY-1 OBGYN Resident  2022 3:39 PM     [facility] staff:  Appreciate note by Dr. Hartman.  I have seen and examined the patient with the resident. I have reviewed, edited, and agree with the note.        Pili Avila MD, FACOG

## 2022-10-02 ENCOUNTER — HEALTH MAINTENANCE LETTER (OUTPATIENT)
Age: 32
End: 2022-10-02

## 2022-10-06 NOTE — TELEPHONE ENCOUNTER
DIAGNOSIS: Pain of left forearm, wrist  / Pili Avila MD / no image   APPOINTMENT DATE: 10.20.22   NOTES STATUS DETAILS   OFFICE NOTE from referring provider Internal 9.21.22 Dr Pili Avila, [facility]   MEDICATION LIST Internal

## 2022-10-07 ENCOUNTER — LAB (OUTPATIENT)
Dept: LAB | Facility: CLINIC | Age: 32
End: 2022-10-07
Payer: COMMERCIAL

## 2022-10-07 LAB
GLUCOSE 1H P 75 G GLC PO SERPL-MCNC: 166 MG/DL (ref 60–179)
GLUCOSE 2H P 75 G GLC PO SERPL-MCNC: 123 MG/DL (ref 60–152)
GLUCOSE P FAST SERPL-MCNC: 98 MG/DL (ref 60–91)

## 2022-10-07 PROCEDURE — 36415 COLL VENOUS BLD VENIPUNCTURE: CPT

## 2022-10-07 PROCEDURE — 82951 GLUCOSE TOLERANCE TEST (GTT): CPT

## 2022-10-11 ENCOUNTER — TELEPHONE (OUTPATIENT)
Dept: OBGYN | Facility: CLINIC | Age: 32
End: 2022-10-11

## 2022-10-11 NOTE — TELEPHONE ENCOUNTER
----- Message from Sandy Bryant RN sent at 10/4/2022  2:53 PM CDT -----  Regarding: needs to schedule glucose test  Gil Chaudhari  1990  [id number]    Had 6 week pp visit Sept 21. Had GDM. Was told she would be scheduled for 2 hours glucose test. Has not received call from anyone to schedule appointment.

## 2022-10-13 ENCOUNTER — MEDICAL CORRESPONDENCE (OUTPATIENT)
Dept: HEALTH INFORMATION MANAGEMENT | Facility: CLINIC | Age: 32
End: 2022-10-13

## 2022-10-20 ENCOUNTER — PRE VISIT (OUTPATIENT)
Dept: ORTHOPEDICS | Facility: CLINIC | Age: 32
End: 2022-10-20

## 2023-02-11 ENCOUNTER — HEALTH MAINTENANCE LETTER (OUTPATIENT)
Age: 33
End: 2023-02-11

## 2023-04-02 ENCOUNTER — TRANSFERRED RECORDS (OUTPATIENT)
Dept: MULTI SPECIALTY CLINIC | Facility: CLINIC | Age: 33
End: 2023-04-02

## 2023-04-02 LAB — RETINOPATHY: NORMAL

## 2023-05-20 ENCOUNTER — HEALTH MAINTENANCE LETTER (OUTPATIENT)
Age: 33
End: 2023-05-20

## 2023-10-21 ENCOUNTER — HEALTH MAINTENANCE LETTER (OUTPATIENT)
Age: 33
End: 2023-10-21

## 2024-03-09 ENCOUNTER — HEALTH MAINTENANCE LETTER (OUTPATIENT)
Age: 34
End: 2024-03-09

## 2024-04-09 ENCOUNTER — OFFICE VISIT (OUTPATIENT)
Dept: FAMILY MEDICINE | Facility: CLINIC | Age: 34
End: 2024-04-09
Payer: COMMERCIAL

## 2024-04-09 VITALS
OXYGEN SATURATION: 97 % | HEART RATE: 89 BPM | BODY MASS INDEX: 23.74 KG/M2 | SYSTOLIC BLOOD PRESSURE: 104 MMHG | TEMPERATURE: 97.9 F | DIASTOLIC BLOOD PRESSURE: 68 MMHG | WEIGHT: 142.5 LBS | HEIGHT: 65 IN | RESPIRATION RATE: 14 BRPM

## 2024-04-09 DIAGNOSIS — J45.41 MODERATE PERSISTENT ASTHMA WITH ACUTE EXACERBATION: Primary | ICD-10-CM

## 2024-04-09 PROBLEM — O24.410 DIET CONTROLLED GESTATIONAL DIABETES MELLITUS (GDM), ANTEPARTUM: Status: ACTIVE | Noted: 2022-06-21

## 2024-04-09 PROCEDURE — 99204 OFFICE O/P NEW MOD 45 MIN: CPT | Performed by: STUDENT IN AN ORGANIZED HEALTH CARE EDUCATION/TRAINING PROGRAM

## 2024-04-09 RX ORDER — FLUTICASONE PROPIONATE AND SALMETEROL 250; 50 UG/1; UG/1
1 POWDER RESPIRATORY (INHALATION) EVERY 12 HOURS
COMMUNITY

## 2024-04-09 RX ORDER — PREDNISONE 20 MG/1
40 TABLET ORAL DAILY
Qty: 10 TABLET | Refills: 0 | Status: SHIPPED | OUTPATIENT
Start: 2024-04-09 | End: 2024-04-14

## 2024-04-09 RX ORDER — LANOLIN ALCOHOL/MO/W.PET/CERES
1000 CREAM (GRAM) TOPICAL DAILY
COMMUNITY

## 2024-04-09 RX ORDER — ALBUTEROL SULFATE 90 UG/1
2 AEROSOL, METERED RESPIRATORY (INHALATION) EVERY 4 HOURS PRN
COMMUNITY
Start: 2022-09-23

## 2024-04-09 RX ORDER — FLUTICASONE PROPIONATE AND SALMETEROL 250; 50 UG/1; UG/1
1 POWDER RESPIRATORY (INHALATION) EVERY 12 HOURS
Qty: 1 EACH | Refills: 11 | Status: SHIPPED | OUTPATIENT
Start: 2024-04-09

## 2024-04-09 ASSESSMENT — ASTHMA QUESTIONNAIRES: ACT_TOTALSCORE: 14

## 2024-04-09 NOTE — PROGRESS NOTES
Assessment & Plan     Moderate persistent asthma with acute exacerbation  Ms. Chaudhari is a 34-year-old female with history of mild intermittent asthma, reports that for the past 4 months she has had daytime symptoms every day, with shortness of breath.  The shortness of breath occurs with anything greater than a brisk walk.  Has had nighttime symptoms once or twice over the past 4 weeks with nighttime waking.  ACT score 14.    Yesterday the patient started to have significant wheezing and shortness of breath at rest with coughing, which did not improve with use of albuterol inhaler which she took twice and Advair 2 50-25 ([facility] formulation) that she took 4 times that day.  She does not use a spacer.    Triggers are upper respiratory infections, and exercise.  She feels well other than the shortness of breath and coughing.    On examination today she is well-appearing, in no acute distress.  Has end inspiratory and expiratory wheezing throughout the lungs.  Oxygenation is normal, normal respiratory rate.    Clinical diagnosis: Moderate persistent asthma with exacerbation.  Will treat exacerbation with prednisone 40 mg daily for 5 days.  Recommend use of spacer to improve delivery of medication, we did discuss possibly changing controller medication however patient already has at least 2 of these inhalers which she had obtained because they were cheaper from [facility] and she would like to give these a trial.  These are Advair 2 50-25.  Recommend 1 puff twice daily.      Return to care in 2 to 3 days if exacerbation not improving, ER precautions given.    She should follow-up in clinic in 1 month to do spirometry and to follow-up on control after change in use of controller and use of spacer.      If not achieving good control with ACT score over 20, will change controller medication at that time.  We had discussed budesonide-formoterol, this is what I would recommend to change to, if needed.          - predniSONE  (DELTASONE) 20 MG tablet  Dispense: 10 tablet; Refill: 0  - fluticasone-salmeterol (ADVAIR) 250-50 MCG/ACT inhaler  Dispense: 1 each; Refill: 11  - Optichamber/Spacer Order for DME - ONLY FOR DME          Subjective   Gil is a 34 year old, presenting for the following health issues:  Asthma (Worsening asthma symptoms. )        4/9/2024     1:19 PM   Additional Questions   Roomed by Clemencia ROSAS     Via the Health Maintenance questionnaire, the patient has reported the following services have been completed -Eye Exam, this information has been sent to the abstraction team.  History of Present Illness       Reason for visit:  Worsening Asthma Symptoms  Symptom onset:  More than a month  Symptoms include:  Wheezing, shortness of breath, cough  Symptom intensity:  Severe  Symptom progression:  Worsening  Had these symptoms before:  Yes  Has tried/received treatment for these symptoms:  Yes  Previous treatment was successful:  Yes  Prior treatment description:  Fluticasone, salbutamol  What makes it worse:  Catching flu  What makes it better:  Inhaler, hot liquids    She eats 0-1 servings of fruits and vegetables daily.She consumes 1 sweetened beverage(s) daily.She exercises with enough effort to increase her heart rate 9 or less minutes per day.  She exercises with enough effort to increase her heart rate 3 or less days per week. She is missing 1 dose(s) of medications per week.     Was diagnosed when she was less than 8 years old.  She was able to do sports without any issues. Did not use an inhaler. She moved from [facility] to [place] and had influenza in 2012 and had a very bad episode of asthma exacerbation.  She was given fluticasone at that time and then stopped it after a month.     Usually uses albuterol one puff before going to the gym and this will usually work.  Does not use a controller.     If she gets a cold then she will take fluticasone as a controller for a week or so and then will stop it again.     Her  "child is in childcare and is getting sick frequently, she will also get sick and have exacerbations.    She has been taking fluticasone without much improvement. Albuterol taken last night took two puffs for any relief but still wheezing.           Asthma Follow-Up    Was ACT completed today?  Yes         How many days per week do you miss taking your asthma controller medication?  0  Please describe any recent triggers for your asthma: upper respiratory infections and exercise or sports  Have you had any Emergency Room Visits, Urgent Care Visits, or Hospital Admissions since your last office visit?  No    Fluticasone dose is 250 mcg/salmeterol 25 mg, taking one puff of this daily, was on two puffs a day in January through March until two weeks ago then went to one puff. Took 4 puffs of controller yesterday without improvement.     Does have wheezing, shortness of breathing, coughing.           Review of Systems  Constitutional, neuro, ENT, endocrine, pulmonary, cardiac, gastrointestinal, genitourinary, musculoskeletal, integument and psychiatric systems are negative, except as otherwise noted.      Objective    /68 (BP Location: Right arm, Patient Position: Sitting, Cuff Size: Adult Regular)   Pulse 89   Temp 97.9  F (36.6  C) (Oral)   Resp 14   Ht 1.638 m (5' 4.5\")   Wt 64.6 kg (142 lb 8 oz)   LMP 04/01/2024 (Exact Date)   SpO2 97%   BMI 24.08 kg/m    Body mass index is 24.08 kg/m .  Physical Exam   GENERAL: alert and no distress  RESP: no rales, rhonchi or wheezes, no rales , no rhonchi, end expiratory wheezes throughout, end inspiratory wheezes throughout.  No increased work of breathing present.  CV: regular rate and rhythm, normal S1 S2, no S3 or S4, no murmur, click or rub, no peripheral edema  MS: no gross musculoskeletal defects noted, no edema            Signed Electronically by: Red Pavon MD    "

## 2024-04-09 NOTE — LETTER
My Asthma Action Plan    Name: Gil Chaudhari   YOB: 1990  Date: 4/9/2024   My doctor: Red Pavon MD   My clinic: [facility]        My Control Medicine: Fluticasone propionate + salmeterol (Advair HFA) -  230/21 mcg on puff twice daily  My Rescue Medicine: Albuterol (Proair/Ventolin/Proventil HFA) 2-4 puffs EVERY 4 HOURS as needed. Use a spacer if recommended by your provider.  My Oral Steroid Medicine: prednisone 40 mg for five days. My Asthma Severity:   Moderate Persistent  Know your asthma triggers: upper respiratory infections and exercise or sports               GREEN ZONE   Good Control  I feel good  No cough or wheeze  Can work, sleep and play without asthma symptoms       Take your asthma control medicine every day.     If exercise triggers your asthma, take your rescue medication  15 minutes before exercise or sports, and  During exercise if you have asthma symptoms  Spacer to use with inhaler: If you have a spacer, make sure to use it with your inhaler             YELLOW ZONE Getting Worse  I have ANY of these:  I do not feel good  Cough or wheeze  Chest feels tight  Wake up at night   Keep taking your Green Zone medications  Start taking your rescue medicine:  every 20 minutes for up to 1 hour. Then every 4 hours for 24-48 hours.  If you stay in the Yellow Zone for more than 12-24 hours, contact your doctor.  If you do not return to the Green Zone in 12-24 hours or you get worse, start taking your oral steroid medicine if prescribed by your provider.           RED ZONE Medical Alert - Get Help  I have ANY of these:  I feel awful  Medicine is not helping  Breathing getting harder  Trouble walking or talking  Nose opens wide to breathe       Take your rescue medicine NOW  If your provider has prescribed an oral steroid medicine, start taking it NOW  Call your doctor NOW  If you are still in the Red Zone after 20 minutes and you have not reached your  doctor:  Take your rescue medicine again and  Call 911 or go to the emergency room right away    See your regular doctor within 2 weeks of an Emergency Room or Urgent Care visit for follow-up treatment.          Annual Reminders:  Meet with Asthma Educator,  Flu Shot in the Fall, consider Pneumonia Vaccination for patients with asthma (aged 19 and older).    Pharmacy: [facility] [id number] [place] [address]    Electronically signed by Red Pavon MD   Date: 04/09/24                      Asthma Triggers  How To Control Things That Make Your Asthma Worse    Triggers are things that make your asthma worse.  Look at the list below to help you find your triggers and what you can do about them.  You can help prevent asthma flare-ups by staying away from your triggers.      Trigger                                                          What you can do   Cigarette Smoke  Tobacco smoke can make asthma worse. Do not allow smoking in your home, car or around you.  Be sure no one smokes at a child s day care or school.  If you smoke, ask your health care provider for ways to help you quit.  Ask family members to quit too.  Ask your health care provider for a referral to Quit Plan to help you quit smoking, or call 4-194-638-PLAN.     Colds, Flu, Bronchitis  These are common triggers of asthma. Wash your hands often.  Don t touch your eyes, nose or mouth.  Get a flu shot every year.     Dust Mites  These are tiny bugs that live in cloth or carpet. They are too small to see. Wash sheets and blankets in hot water every week.   Encase pillows and mattress in dust mite proof covers.  Avoid having carpet if you can. If you have carpet, vacuum weekly.   Use a dust mask and HEPA vacuum.   Pollen and Outdoor Mold  Some people are allergic to trees, grass, or weed pollen, or molds. Try to keep your windows closed.  Limit time out doors when pollen count is high.   Ask you health care provider about taking medicine  during allergy season.     Animal Dander  Some people are allergic to skin flakes, urine or saliva from pets with fur or feathers. Keep pets with fur or feathers out of your home.    If you can t keep the pet outdoors, then keep the pet out of your bedroom.  Keep the bedroom door closed.  Keep pets off cloth furniture and away from stuffed toys.     Mice, Rats, and Cockroaches   Some people are allergic to the waste from these pests.   Cover food and garbage.  Clean up spills and food crumbs.  Store grease in the refrigerator.   Keep food out of the bedroom.   Indoor Mold  This can be a trigger if your home has high moisture. Fix leaking faucets, pipes, or other sources of water.   Clean moldy surfaces.  Dehumidify basement if it is damp and smelly.   Smoke, Strong Odors, and Sprays  These can reduce air quality. Stay away from strong odors and sprays, such as perfume, powder, hair spray, paints, smoke incense, paint, cleaning products, candles and new carpet.   Exercise or Sports  Some people with asthma have this trigger. Be active!  Ask your doctor about taking medicine before sports or exercise to prevent symptoms.    Warm up for 5-10 minutes before and after sports or exercise.     Other Triggers of Asthma  Cold air:  Cover your nose and mouth with a scarf.  Sometimes laughing or crying can be a trigger.  Some medicines and food can trigger asthma.

## 2024-04-16 ENCOUNTER — MYC MEDICAL ADVICE (OUTPATIENT)
Dept: FAMILY MEDICINE | Facility: CLINIC | Age: 34
End: 2024-04-16
Payer: COMMERCIAL

## 2024-04-17 NOTE — TELEPHONE ENCOUNTER
"LOV 4/9/24    \"Clinical diagnosis: Moderate persistent asthma with exacerbation.  Will treat exacerbation with prednisone 40 mg daily for 5 days.  Recommend use of spacer to improve delivery of medication, we did discuss possibly changing controller medication however patient already has at least 2 of these inhalers which she had obtained because they were cheaper from [facility] and she would like to give these a trial.  These are Advair 2 50-25.  Recommend 1 puff twice daily. \"      "

## 2024-04-17 NOTE — TELEPHONE ENCOUNTER
Ms. Chaudhari,    Yes I would not use the spacer with the powder.  You may use it with the albuterol inhaler.

## 2025-03-16 ENCOUNTER — HEALTH MAINTENANCE LETTER (OUTPATIENT)
Age: 35
End: 2025-03-16

## 2025-04-14 NOTE — PROGRESS NOTES
"Please see \"Imaging\" tab under Chart Review for full details.    Radha Bansal MD  Maternal Fetal Medicine    "
NST Performed due to severe FGR.  Dr. Bansal reviewed efm tracing. See NST/BPP Doc Flowsheet tab.    
"MY baby is moving less" Pt states that her baby never moves a lot but today seems less

## 2025-06-08 ENCOUNTER — APPOINTMENT (OUTPATIENT)
Dept: ULTRASOUND IMAGING | Facility: CLINIC | Age: 35
End: 2025-06-08
Attending: EMERGENCY MEDICINE
Payer: COMMERCIAL

## 2025-06-08 ENCOUNTER — HOSPITAL ENCOUNTER (EMERGENCY)
Facility: CLINIC | Age: 35
Discharge: HOME | End: 2025-06-08
Attending: EMERGENCY MEDICINE | Admitting: EMERGENCY MEDICINE
Payer: COMMERCIAL

## 2025-06-08 VITALS
TEMPERATURE: 98.6 F | HEIGHT: 65 IN | DIASTOLIC BLOOD PRESSURE: 70 MMHG | WEIGHT: 154 LBS | HEART RATE: 78 BPM | OXYGEN SATURATION: 99 % | BODY MASS INDEX: 25.66 KG/M2 | RESPIRATION RATE: 18 BRPM | SYSTOLIC BLOOD PRESSURE: 103 MMHG

## 2025-06-08 DIAGNOSIS — O20.8 SUBCHORIONIC HEMORRHAGE IN FIRST TRIMESTER: ICD-10-CM

## 2025-06-08 DIAGNOSIS — H10.31 ACUTE CONJUNCTIVITIS OF RIGHT EYE, UNSPECIFIED ACUTE CONJUNCTIVITIS TYPE: ICD-10-CM

## 2025-06-08 LAB
ERYTHROCYTE [DISTWIDTH] IN BLOOD BY AUTOMATED COUNT: 13.8 % (ref 10–15)
HCG INTACT+B SERPL-ACNC: ABNORMAL MIU/ML
HCT VFR BLD AUTO: 34.2 % (ref 35–47)
HGB BLD-MCNC: 11.9 G/DL (ref 11.7–15.7)
MCH RBC QN AUTO: 31.7 PG (ref 26.5–33)
MCHC RBC AUTO-ENTMCNC: 34.8 G/DL (ref 31.5–36.5)
MCV RBC AUTO: 91 FL (ref 78–100)
PLATELET # BLD AUTO: 177 10E3/UL (ref 150–450)
RBC # BLD AUTO: 3.75 10E6/UL (ref 3.8–5.2)
WBC # BLD AUTO: 5.4 10E3/UL (ref 4–11)

## 2025-06-08 PROCEDURE — 99285 EMERGENCY DEPT VISIT HI MDM: CPT | Mod: 25 | Performed by: EMERGENCY MEDICINE

## 2025-06-08 PROCEDURE — 76801 OB US < 14 WKS SINGLE FETUS: CPT

## 2025-06-08 PROCEDURE — 36415 COLL VENOUS BLD VENIPUNCTURE: CPT | Performed by: EMERGENCY MEDICINE

## 2025-06-08 PROCEDURE — 85041 AUTOMATED RBC COUNT: CPT | Performed by: EMERGENCY MEDICINE

## 2025-06-08 PROCEDURE — 84702 CHORIONIC GONADOTROPIN TEST: CPT | Performed by: EMERGENCY MEDICINE

## 2025-06-08 RX ORDER — ERYTHROMYCIN 5 MG/G
0.5 OINTMENT OPHTHALMIC 4 TIMES DAILY
Qty: 3.5 G | Refills: 0 | Status: SHIPPED | OUTPATIENT
Start: 2025-06-08 | End: 2025-06-13

## 2025-06-08 RX ORDER — FLUTICASONE PROPIONATE AND SALMETEROL 250; 50 UG/1; UG/1
1 POWDER RESPIRATORY (INHALATION) EVERY 12 HOURS
Qty: 1 EACH | Refills: 0 | Status: SHIPPED | OUTPATIENT
Start: 2025-06-08

## 2025-06-08 ASSESSMENT — COLUMBIA-SUICIDE SEVERITY RATING SCALE - C-SSRS
2. HAVE YOU ACTUALLY HAD ANY THOUGHTS OF KILLING YOURSELF IN THE PAST MONTH?: NO
1. IN THE PAST MONTH, HAVE YOU WISHED YOU WERE DEAD OR WISHED YOU COULD GO TO SLEEP AND NOT WAKE UP?: NO
6. HAVE YOU EVER DONE ANYTHING, STARTED TO DO ANYTHING, OR PREPARED TO DO ANYTHING TO END YOUR LIFE?: NO

## 2025-06-08 ASSESSMENT — ACTIVITIES OF DAILY LIVING (ADL)
ADLS_ACUITY_SCORE: 52
ADLS_ACUITY_SCORE: 52

## 2025-06-08 NOTE — DISCHARGE INSTRUCTIONS
There are multiple uterine fibroids are noted, with the largest on the right measuring 3.1 cm.   You are currently measuring 10 weeks 4 days with healthy cardiac activity at 167 bpm  There is a small area of subchorionic hemorrhage measuring 1.6 x 0.7 x 0.7 cm.     Your hemoglobin is stable at 11.9

## 2025-06-08 NOTE — ED TRIAGE NOTES
Patient arrived ambulatory with  for concerns related to vaginal bleeding ad pregnant.  She is 10wks 1 day today and started spotting last night around 2100.  Had a panty liner in place and at 0400 this morning reports large amount of blood in toilet.       Triage Assessment (Adult)       Row Name 06/08/25 0549          Triage Assessment    Airway WDL WDL        Respiratory WDL    Respiratory WDL WDL        Skin Circulation/Temperature WDL    Skin Circulation/Temperature WDL WDL        Cardiac WDL    Cardiac WDL WDL        Peripheral/Neurovascular WDL    Peripheral Neurovascular WDL WDL        Cognitive/Neuro/Behavioral WDL    Cognitive/Neuro/Behavioral WDL WDL

## 2025-06-08 NOTE — ED NOTES
Pt refusing IV placement and blood draw at this time. States she had a full panel done a week and a half ago. MD made aware.

## 2025-06-08 NOTE — ED PROVIDER NOTES
EMERGENCY DEPARTMENT ENCOUNTER      NAME: Gil Chaudhari  AGE: 35 year old female  YOB: 1990  MRN: [id number]  EVALUATION DATE & TIME: 6/8/2025  5:48 AM    PCP: System, Provider Not In    ED PROVIDER: Cherie Sanders MD      Chief Complaint   Patient presents with    Vaginal Bleeding - Pregnant         FINAL IMPRESSION:  1. Subchorionic hemorrhage in first trimester    2. Acute conjunctivitis of right eye, unspecified acute conjunctivitis type          ED COURSE & MEDICAL DECISION MAKING:    Pertinent Labs & Imaging studies reviewed. (See chart for details)  35 year old female presents to the Emergency Department for evaluation of vaginal bleeding.  The patient is a G4, P1, currently 10 weeks 1 day pregnant based on her LMP.  She reports that she developed vaginal spotting since yesterday.  She has had 2 previous miscarriages in the first trimester.  She had vaginal bleeding with her first pregnancy, and was diagnosed with a subchorionic hemorrhage.  She has had a small amount of bleeding since yesterday.  No abdominal cramping.  On exam, she has no significant tenderness.  Pelvic ultrasound is consistent with an intrauterine pregnancy measuring 10 weeks 4 days with a small subchorionic hemorrhage.  Hemoglobin is stable.  Vital signs are stable.  Patient noting crusting in her right eye for 2 days after using mascara.  Her right conjunctival is erythematous, there is no discharge noted.  She does not wear contacts.  I discussed with her management of subchorionic hemorrhage which is mostly supportive, light activity with pelvic rest.  Will also plan to treat for conjunctivitis with erythromycin.  Patient also requesting a refill of her longstanding Advair.  This was given.    6:01 AM Met with patient and performed my initial exam.   7:21 AM Updated patient on results. Discussed plans for discharge.     At the conclusion of the encounter I discussed the results of all of the tests and the disposition. The  questions were answered. The patient or family acknowledged understanding and was agreeable with the care plan.       Medical Decision Making      Discharge. No recommendations on prescription strength medication(s). See documentation for any additional details.    MIPS (CTPE, Dental pain, Archer, Sinusitis, Asthma/COPD, Head Trauma): Not Applicable    SEPSIS: None          MIPS: Not Applicable        MEDICATIONS GIVEN IN THE EMERGENCY:  Medications - No data to display    NEW PRESCRIPTIONS STARTED AT TODAY'S ER VISIT  New Prescriptions    ERYTHROMYCIN (ROMYCIN) 5 MG/GM OPHTHALMIC OINTMENT    Place 0.5 inches into the right eye 4 times daily for 5 days.          =================================================================    HPI    Patient information was obtained from: patient and significant other    Use of : N/A         Gil Chaudhari is a 35 year old female with a pertinent history of asthma, GDM who presents to this ED for evaluation of vaginal bleeding.     Patient reports she is 10 weeks 1 day pregnant based off of her LMP. Per first US at 8 weeks, she reports she was 7 weeks 6 days pregnant. Last pregnancy had similar vaginal bleeding and she was diagnosed with a subchorionic hemorrhage at the time. Patient reports this is her fourth pregnancy and has a history of 2 miscarriages.     Recent vaginal bleeding began yesterday around 9-10 PM when she noticed slightly red spotting. She reports difficulties while sleeping throughout the night and when she woke up to urinate at 4 AM, she noticed increased vaginal bleeding in the toilet bowl. In the ED, she also reports some mild throat discomfort when swallowing, which was present at her at her most recent ultrasound as well but associates this with a possible viral illness. Patient is taking prenatal vitamins and was recently started on baby aspirin by the 9th week of her pregnancy due to history of preeclampsia. History of fluticasone for asthma but  completely stopped this 5-6 days ago. Labs were drawn about 1.5 weeks ago due to history with pregnancies. Denies any vaginal discomfort, other abnormal bleeding, leg swelling, or urinary issues. No Advil or Motrin use.    Patient endorses applying mascara 2 days ago and noticed crusting around the right eye in the morning. She also reports some irritation and pink coloration surrounding the right eye. Denies any contact use. Patient would like a refill of her Advair prescription. No other complaints or concerns at this time.       PAST MEDICAL HISTORY:  Past Medical History:   Diagnosis Date    Diabetes (H)     gestational diabetic- Diet control    Uncomplicated asthma        PAST SURGICAL HISTORY:  Past Surgical History:   Procedure Laterality Date     SECTION N/A 8/3/2022    Procedure:  SECTION;  Surgeon: Mary Pulliam MD;  Location: [facility] L[facility]           CURRENT MEDICATIONS:    erythromycin (ROMYCIN) 5 MG/GM ophthalmic ointment  fluticasone-salmeterol (ADVAIR) 250-50 MCG/ACT inhaler  acetaminophen (TYLENOL) 325 MG tablet  albuterol (PROAIR HFA/PROVENTIL HFA/VENTOLIN HFA) 108 (90 Base) MCG/ACT inhaler  cyanocobalamin (VITAMIN B-12) 1000 MCG tablet        ALLERGIES:  No Known Allergies    FAMILY HISTORY:  No family history on file.    SOCIAL HISTORY:   Social History     Socioeconomic History    Marital status:    Tobacco Use    Smoking status: Never     Passive exposure: Never    Smokeless tobacco: Never   Vaping Use    Vaping status: Never Used   Substance and Sexual Activity    Alcohol use: Never    Drug use: Never    Sexual activity: Yes     Partners: Male     Social Drivers of Health     Financial Resource Strain: Low Risk  (2022)    Received from [facility]    Overall Financial Resource Strain (CARDIA)     Difficulty of Paying Living Expenses: Not hard at all   Food Insecurity: No Food Insecurity (2022)    Received from [facility]    Hunger Vital Sign     Worried About  Running Out of Food in the Last Year: Never true     Ran Out of Food in the Last Year: Never true   Transportation Needs: No Transportation Needs (11/8/2022)    Received from [facility]    PRAPARE - Transportation     Lack of Transportation (Medical): No     Lack of Transportation (Non-Medical): No   Physical Activity: Inactive (11/8/2022)    Received from [facility]    Exercise Vital Sign     Days of Exercise per Week: 0 days     Minutes of Exercise per Session: 10 min   Stress: Stress Concern Present (11/8/2022)    Received from [facility]    [employer] [place] [employer] - Occupational Stress Questionnaire     Feeling of Stress : To some extent   Social Connections: Socially Integrated (11/8/2022)    Received from [facility]    Social Connection and Isolation Panel [NHANES]     Frequency of Communication with Friends and Family: More than three times a week     Frequency of Social Gatherings with Friends and Family: Once a week     Attends [employer] Services: 1 to 4 times per year     Active Member of Clubs or Organizations: No     Attends Club or Organization Meetings: 1 to 4 times per year     Marital Status:    Interpersonal Safety: Low Risk  (4/9/2024)    Interpersonal Safety     Do you feel physically and emotionally safe where you currently live?: Yes     Within the past 12 months, have you been hit, slapped, kicked or otherwise physically hurt by someone?: No     Within the past 12 months, have you been humiliated or emotionally abused in other ways by your partner or ex-partner?: No   Housing Stability: Low Risk  (11/8/2022)    Received from [facility]    Housing Stability Vital Sign     Unable to Pay for Housing in the Last Year: No     Number of Places Lived in the Last Year: 1     In the last 12 months, was there a time when you did not have a steady place to sleep or slept in a shelter (including now)?: No       VITALS:  /77   Pulse 80   Temp 98.6  F (37  C) (Oral)    "Resp 17   Ht 1.651 m (5' 5\")   Wt 69.9 kg (154 lb)   SpO2 99%   Breastfeeding No   BMI 25.63 kg/m      PHYSICAL EXAM    Constitutional: Well developed, Well nourished, NAD  HENT: Normocephalic, Atraumatic, Bilateral external ears normal, Oropharynx normal, mucous membranes moist, Nose normal.   Neck- Normal range of motion, No tenderness, Supple, No stridor.  Eyes: PERRL, EOMI, Mild erythema to the right conjunctiva, No discharge.   Respiratory: Normal breath sounds, No respiratory distress  Cardiovascular: Normal heart rate, Regular rhythm  GI: Bowel sounds normal, Soft, No tenderness,   Musculoskeletal: No edema. Good range of motion in all major joints. No tenderness to palpation or major deformities noted.   Integument: Warm, Dry, No erythema, No rash  Neurologic: Alert & oriented x 3, Normal motor function, Normal sensory function, No focal deficits noted. Normal gait.   Psychiatric: Affect normal, Judgment normal, Mood normal.      LAB:  All pertinent labs reviewed and interpreted.  Results for orders placed or performed during the hospital encounter of 06/08/25   US OB < 14 Weeks Single    Impression    IMPRESSION:   1.  Single intrauterine gestation with cardiac activity at 10 weeks 4 days, with EDC of 12/31/2025.  2.  Small subchorionic hemorrhage.  3.  Uterine fibroids.     HCG quantitative pregnancy (blood)   Result Value Ref Range    hCG Quantitative 56,340 (H) <5 mIU/mL   CBC (+ platelets, no diff)   Result Value Ref Range    WBC Count 5.4 4.0 - 11.0 10e3/uL    RBC Count 3.75 (L) 3.80 - 5.20 10e6/uL    Hemoglobin 11.9 11.7 - 15.7 g/dL    Hematocrit 34.2 (L) 35.0 - 47.0 %    MCV 91 78 - 100 fL    MCH 31.7 26.5 - 33.0 pg    MCHC 34.8 31.5 - 36.5 g/dL    RDW 13.8 10.0 - 15.0 %    Platelet Count 177 150 - 450 10e3/uL       RADIOLOGY:  Reviewed all pertinent imaging. Please see official radiology report.  US OB < 14 Weeks Single   Final Result   IMPRESSION:    1.  Single intrauterine gestation with " cardiac activity at 10 weeks 4 days, with EDC of 12/31/2025.   2.  Small subchorionic hemorrhage.   3.  Uterine fibroids.             EKG:    None    PROCEDURES:   None      I, Lauren Flores, am serving as a scribe to document services personally performed by Cherie Sanders MD, based on my observation and the provider's statements to me. I, Cherie Sanders MD, attest that Lauren Flores is acting in a scribe capacity, has observed my performance of the services and has documented them in accordance with my direction.    Cherie Sanders MD  Emergency Medicine  [facility] EMERGENCY ROOM  [facility] [facility] [id number] [phone]       Cherie Sanders MD  06/08/25 0803

## 2025-06-20 ENCOUNTER — TRANSFERRED RECORDS (OUTPATIENT)
Dept: HEALTH INFORMATION MANAGEMENT | Facility: CLINIC | Age: 35
End: 2025-06-20
Payer: COMMERCIAL

## 2025-06-20 ENCOUNTER — MEDICAL CORRESPONDENCE (OUTPATIENT)
Dept: HEALTH INFORMATION MANAGEMENT | Facility: CLINIC | Age: 35
End: 2025-06-20
Payer: COMMERCIAL

## 2025-06-23 ENCOUNTER — TRANSCRIBE ORDERS (OUTPATIENT)
Dept: MATERNAL FETAL MEDICINE | Facility: CLINIC | Age: 35
End: 2025-06-23
Payer: COMMERCIAL

## 2025-06-23 DIAGNOSIS — O26.90 PREGNANCY RELATED CONDITION, ANTEPARTUM: Primary | ICD-10-CM

## 2025-07-08 ENCOUNTER — APPOINTMENT (OUTPATIENT)
Dept: ULTRASOUND IMAGING | Facility: CLINIC | Age: 35
End: 2025-07-08
Attending: EMERGENCY MEDICINE
Payer: COMMERCIAL

## 2025-07-08 ENCOUNTER — HOSPITAL ENCOUNTER (EMERGENCY)
Facility: CLINIC | Age: 35
Discharge: HOME | End: 2025-07-08
Attending: EMERGENCY MEDICINE | Admitting: EMERGENCY MEDICINE
Payer: COMMERCIAL

## 2025-07-08 VITALS
TEMPERATURE: 97.9 F | HEART RATE: 85 BPM | RESPIRATION RATE: 17 BRPM | WEIGHT: 155 LBS | OXYGEN SATURATION: 100 % | BODY MASS INDEX: 25.79 KG/M2 | DIASTOLIC BLOOD PRESSURE: 76 MMHG | SYSTOLIC BLOOD PRESSURE: 128 MMHG

## 2025-07-08 DIAGNOSIS — O46.90 VAGINAL BLEEDING IN PREGNANCY: ICD-10-CM

## 2025-07-08 LAB
ABO + RH BLD: NORMAL
ALBUMIN UR-MCNC: NEGATIVE MG/DL
ANION GAP SERPL CALCULATED.3IONS-SCNC: 11 MMOL/L (ref 7–15)
APPEARANCE UR: CLEAR
BASOPHILS # BLD AUTO: 0 10E3/UL (ref 0–0.2)
BASOPHILS NFR BLD AUTO: 0 %
BILIRUB UR QL STRIP: NEGATIVE
BLD GP AB SCN SERPL QL: NEGATIVE
BUN SERPL-MCNC: 4.9 MG/DL (ref 6–20)
CALCIUM SERPL-MCNC: 9.2 MG/DL (ref 8.8–10.4)
CHLORIDE SERPL-SCNC: 102 MMOL/L (ref 98–107)
CLUE CELLS: NORMAL
COLOR UR AUTO: COLORLESS
CREAT SERPL-MCNC: 0.49 MG/DL (ref 0.51–0.95)
EGFRCR SERPLBLD CKD-EPI 2021: >90 ML/MIN/1.73M2
EOSINOPHIL # BLD AUTO: 0.3 10E3/UL (ref 0–0.7)
EOSINOPHIL NFR BLD AUTO: 4 %
ERYTHROCYTE [DISTWIDTH] IN BLOOD BY AUTOMATED COUNT: 13.8 % (ref 10–15)
GLUCOSE SERPL-MCNC: 66 MG/DL (ref 70–99)
GLUCOSE UR STRIP-MCNC: NEGATIVE MG/DL
HCO3 SERPL-SCNC: 21 MMOL/L (ref 22–29)
HCT VFR BLD AUTO: 32.5 % (ref 35–47)
HGB BLD-MCNC: 11.4 G/DL (ref 11.7–15.7)
HGB UR QL STRIP: ABNORMAL
IMM GRANULOCYTES # BLD: 0 10E3/UL
IMM GRANULOCYTES NFR BLD: 0 %
KETONES UR STRIP-MCNC: NEGATIVE MG/DL
LEUKOCYTE ESTERASE UR QL STRIP: NEGATIVE
LYMPHOCYTES # BLD AUTO: 2.4 10E3/UL (ref 0.8–5.3)
LYMPHOCYTES NFR BLD AUTO: 31 %
MCH RBC QN AUTO: 31.6 PG (ref 26.5–33)
MCHC RBC AUTO-ENTMCNC: 35.1 G/DL (ref 31.5–36.5)
MCV RBC AUTO: 90 FL (ref 78–100)
MONOCYTES # BLD AUTO: 0.7 10E3/UL (ref 0–1.3)
MONOCYTES NFR BLD AUTO: 9 %
MUCOUS THREADS #/AREA URNS LPF: PRESENT /LPF
NEUTROPHILS # BLD AUTO: 4.3 10E3/UL (ref 1.6–8.3)
NEUTROPHILS NFR BLD AUTO: 55 %
NITRATE UR QL: NEGATIVE
NRBC # BLD AUTO: 0 10E3/UL
NRBC BLD AUTO-RTO: 0 /100
PH UR STRIP: 6 [PH] (ref 5–7)
PLATELET # BLD AUTO: 145 10E3/UL (ref 150–450)
POTASSIUM SERPL-SCNC: 3.7 MMOL/L (ref 3.4–5.3)
RBC # BLD AUTO: 3.61 10E6/UL (ref 3.8–5.2)
RBC URINE: 2 /HPF
SODIUM SERPL-SCNC: 134 MMOL/L (ref 135–145)
SP GR UR STRIP: 1 (ref 1–1.03)
SPECIMEN EXP DATE BLD: NORMAL
SQUAMOUS EPITHELIAL: <1 /HPF
TRICHOMONAS, WET PREP: NORMAL
UROBILINOGEN UR STRIP-MCNC: NORMAL MG/DL
WBC # BLD AUTO: 7.7 10E3/UL (ref 4–11)
WBC URINE: 1 /HPF
WBC'S/HIGH POWER FIELD, WET PREP: NORMAL
YEAST, WET PREP: NORMAL

## 2025-07-08 PROCEDURE — 76805 OB US >/= 14 WKS SNGL FETUS: CPT

## 2025-07-08 PROCEDURE — 81003 URINALYSIS AUTO W/O SCOPE: CPT | Performed by: EMERGENCY MEDICINE

## 2025-07-08 PROCEDURE — 36415 COLL VENOUS BLD VENIPUNCTURE: CPT | Performed by: EMERGENCY MEDICINE

## 2025-07-08 PROCEDURE — 86900 BLOOD TYPING SEROLOGIC ABO: CPT | Performed by: EMERGENCY MEDICINE

## 2025-07-08 PROCEDURE — 80048 BASIC METABOLIC PNL TOTAL CA: CPT | Performed by: EMERGENCY MEDICINE

## 2025-07-08 PROCEDURE — 85014 HEMATOCRIT: CPT | Performed by: EMERGENCY MEDICINE

## 2025-07-08 PROCEDURE — 87210 SMEAR WET MOUNT SALINE/INK: CPT | Performed by: EMERGENCY MEDICINE

## 2025-07-08 PROCEDURE — 99284 EMERGENCY DEPT VISIT MOD MDM: CPT | Mod: 25 | Performed by: EMERGENCY MEDICINE

## 2025-07-08 ASSESSMENT — COLUMBIA-SUICIDE SEVERITY RATING SCALE - C-SSRS
1. IN THE PAST MONTH, HAVE YOU WISHED YOU WERE DEAD OR WISHED YOU COULD GO TO SLEEP AND NOT WAKE UP?: NO
6. HAVE YOU EVER DONE ANYTHING, STARTED TO DO ANYTHING, OR PREPARED TO DO ANYTHING TO END YOUR LIFE?: NO
2. HAVE YOU ACTUALLY HAD ANY THOUGHTS OF KILLING YOURSELF IN THE PAST MONTH?: NO

## 2025-07-08 ASSESSMENT — ACTIVITIES OF DAILY LIVING (ADL)
ADLS_ACUITY_SCORE: 52
ADLS_ACUITY_SCORE: 52

## 2025-07-08 NOTE — ED TRIAGE NOTES
Pt here with abdominal cramping and a sudden rush of vaginal bleeding around 3 pm while at work. States the cramping lasted about 10 mins and now just tenderness. Was seen at 10 weeks pregnant for spotting and had a subchorionic hemorrhage. This time the bleeding is much more. She is about 14 weeks pregnant at this time.      Triage Assessment (Adult)       Row Name 07/08/25 0854          Triage Assessment    Airway WDL WDL        Respiratory WDL    Respiratory WDL WDL        Skin Circulation/Temperature WDL    Skin Circulation/Temperature WDL WDL        Cardiac WDL    Cardiac WDL WDL        Peripheral/Neurovascular WDL    Peripheral Neurovascular WDL WDL        Cognitive/Neuro/Behavioral WDL    Cognitive/Neuro/Behavioral WDL WDL

## 2025-07-08 NOTE — DISCHARGE INSTRUCTIONS
Again, no obvious cause of bleeding on ultrasound  Rest, don't do any heavy lifting, stay well hydrated, nothing in the vagina  Give office a call tomorrow and will recheck tomorrow.  Can hold baby aspirin for now.    Return if any acute worsening of bleeding such as bleeding through more than 1 pad an hour for 3 hours straight, significant worsening pain or any other concerns

## 2025-07-08 NOTE — ED PROVIDER NOTES
EMERGENCY DEPARTMENT ENCOUNTER      NAME: Gil Chaudhari  AGE: 35 year old female  YOB: 1990  MRN: [id number]  EVALUATION DATE & TIME: 7/8/2025  4:39 PM    PCP: No Ref-Primary, Physician    ED PROVIDER: Yoli Cruz DO      Chief Complaint   Patient presents with    Vaginal Bleeding - Pregnant         FINAL IMPRESSION:  1. Vaginal bleeding in pregnancy          ED COURSE & MEDICAL DECISION MAKING:    Pertinent Labs & Imaging studies reviewed. (See chart for details)  4:11 PM I met the patient and performed my initial interview and exam.   6:00 PM I rechecked patient, she is still bleeding and cramping.    6:40 PM I preformed a pelvic exam on patient.   6:45 PM I spoke with Dr. Rollins from OB who said to pelvic rest, hydrate, and check with clinic tomorrow.    6:54 PM We discussed the plan for discharge and the patient is agreeable. Reviewed supportive cares, symptomatic treatment, outpatient follow up, and reasons to return to the Emergency Department. Patient to be discharged by ED RN.      35 year old female presents to the Emergency Department for evaluation of vaginal bleeding in pregnancy.  She is 14 weeks 4 days.  Reports she had some spotting around 10 weeks pregnancy was diagnosed with a subchorionic hemorrhage.  Today was at work when suddenly felt a gush of blood.  Felt similar to menstruation.  Some mild cramping.  Reports earlier today had some cramping to the left side of her abdomen and looked online and thought was [place].  No trauma.  No recent intercourse.  No concerning vaginal discharge.  She is nontoxic-appearing.   Labs with hemoglobin that appear her baseline.  Mild thrombocytopenia, she is on aspirin at this time.  She is Rh+.  No leukocytosis.  UA without evidence of infection.  Ultrasound obtained and showed normal-appearing fetus with interval growth, no other abnormalities.  Patient tells me she still bleeding.  Some mild cramping.  Pelvic exam here with notable bleeding.  " Slowly pooling, no clots and no hemorrhage.  Consistency of a menstrual cycle.  Unable to get a good view of the cervix.  Wet prep was negative.  I discussed with OB/GYN.  Did report not much to do.  Patient to rest, stay hydrated.  Reasonable to hold aspirin for now.  If ongoing bleeding they recommend she call in the morning and have them see her.  I think this is reasonable.  Patient is agreeable with plan of care.  Did discuss return if any acute worsening symptoms such as uncontrolled pain, bleeding through more than 1 pad an hour for several hours in a row or any other concerns.    At the conclusion of the encounter I discussed the results of all of the tests and the disposition. The questions were answered. The patient or family acknowledged understanding and was agreeable with the care plan.     Medical Decision Making      Discharge. No recommendations on prescription strength medication(s). I considered admission, but ultimately discharged patient after discussion with OB.    MIPS (CTPE, Dental pain, Archer, Sinusitis, Asthma/COPD, Head Trauma): Not Applicable    SEPSIS: None        MEDICATIONS GIVEN IN THE EMERGENCY:  Medications - No data to display    NEW PRESCRIPTIONS STARTED AT TODAY'S ER VISIT  New Prescriptions    No medications on file          =================================================================    HPI    Patient information was obtained from: patient     Use of : N/A     Gil Chaudhari is a 35 year old female  with a pertinent history of asthma, who presents to this ED via walk in with  for evaluation of vaginal bleeding, 14 weeks pregnant.      Per chart review:   2025 (~1 month ago) at [facility] Emergency Room regarding Vaginal Bleeding - Pregnant.\"Pelvic ultrasound is consistent with an intrauterine pregnancy measuring 10 weeks 4 days with a small subchorionic hemorrhage. Hemoglobin is stable. Patient noting crusting in her " "right eye for 2 days after using mascara.  Her right conjunctival is erythematous, there is no discharge noted. Discussion of  management of subchorionic hemorrhage which is mostly supportive, light activity with pelvic rest.  Will also plan to treat for conjunctivitis with erythromycin.  Patient also requesting a refill of her longstanding Advair, this was given.\"    Patient endorses vaginal bleeding today while at work around 3:00 PM. States she stood up and felt a rush of blood after standing, she went to the bathroom and said it was lots of blood like she was on her menstrual cycle. She denied clotting blood, it was pure blood. Patient states she has had spotting all throughout her pregnancy. She has a subchronic hemorrhage onset 25. Today she endorsed cramping and contraction like feeling around 11:00 AM. She said she was spotting for about 2-3 weeks then it stopped, since then she has had normal discharge. Currently she has mild cramping with tenderness. She has 1 daughter, 2 miscarriages, 4th pregnancy. Saw her OBGYN at 12 week appointment. She does not want an IV at this time. Denies dizziness, lightheadedness, recent falls, trauma to belly, or any other concerns at this time.       REVIEW OF SYSTEMS   Per HPI    PAST MEDICAL HISTORY:  Past Medical History:   Diagnosis Date    Diabetes (H)     gestational diabetic- Diet control    Uncomplicated asthma        PAST SURGICAL HISTORY:  Past Surgical History:   Procedure Laterality Date     SECTION N/A 8/3/2022    Procedure:  SECTION;  Surgeon: Mary Pulliam MD;  Location:  L+D           CURRENT MEDICATIONS:    acetaminophen (TYLENOL) 325 MG tablet  albuterol (PROAIR HFA/PROVENTIL HFA/VENTOLIN HFA) 108 (90 Base) MCG/ACT inhaler  cyanocobalamin (VITAMIN B-12) 1000 MCG tablet  fluticasone-salmeterol (ADVAIR) 250-50 MCG/ACT inhaler         ALLERGIES:  No Known Allergies    FAMILY HISTORY:  No family history on file.    SOCIAL HISTORY: "   Social History     Socioeconomic History    Marital status:    Tobacco Use    Smoking status: Never     Passive exposure: Never    Smokeless tobacco: Never   Vaping Use    Vaping status: Never Used   Substance and Sexual Activity    Alcohol use: Never    Drug use: Never    Sexual activity: Yes     Partners: Male     Social Drivers of Health     Financial Resource Strain: Low Risk  (11/8/2022)    Received from [facility]    Overall Financial Resource Strain (CARDIA)     Difficulty of Paying Living Expenses: Not hard at all   Food Insecurity: No Food Insecurity (11/8/2022)    Received from [facility]    Hunger Vital Sign     Worried About Running Out of Food in the Last Year: Never true     Ran Out of Food in the Last Year: Never true   Transportation Needs: No Transportation Needs (11/8/2022)    Received from [facility]    PRAPARE - Transportation     Lack of Transportation (Medical): No     Lack of Transportation (Non-Medical): No   Physical Activity: Inactive (11/8/2022)    Received from [facility]    Exercise Vital Sign     Days of Exercise per Week: 0 days     Minutes of Exercise per Session: 10 min   Stress: Stress Concern Present (11/8/2022)    Received from [facility]    [employer] - Occupational Stress Questionnaire     Feeling of Stress : To some extent   Social Connections: Socially Integrated (11/8/2022)    Received from [facility]    Social Connection and Isolation Panel [NHANES]     Frequency of Communication with Friends and Family: More than three times a week     Frequency of Social Gatherings with Friends and Family: Once a week     Attends [employer] Services: 1 to 4 times per year     Active Member of Clubs or Organizations: No     Attends Club or Organization Meetings: 1 to 4 times per year     Marital Status:    Interpersonal Safety: Low Risk  (4/9/2024)    Interpersonal Safety     Do you feel physically and emotionally safe where you currently  live?: Yes     Within the past 12 months, have you been hit, slapped, kicked or otherwise physically hurt by someone?: No     Within the past 12 months, have you been humiliated or emotionally abused in other ways by your partner or ex-partner?: No   Housing Stability: Low Risk  (11/8/2022)    Received from [facility]    Housing Stability Vital Sign     Unable to Pay for Housing in the Last Year: No     Number of Places Lived in the Last Year: 1     In the last 12 months, was there a time when you did not have a steady place to sleep or slept in a shelter (including now)?: No       VITALS:  /82   Pulse 85   Temp 97.9  F (36.6  C)   Resp 17   Wt 70.3 kg (155 lb)   SpO2 100%   BMI 25.79 kg/m      PHYSICAL EXAM    Physical Exam  Exam conducted with a chaperone present.   Constitutional:       General: She is not in acute distress.  HENT:      Head: Normocephalic and atraumatic.      Mouth/Throat:      Pharynx: Oropharynx is clear.   Eyes:      Pupils: Pupils are equal, round, and reactive to light.   Cardiovascular:      Rate and Rhythm: Normal rate and regular rhythm.      Pulses: Normal pulses.      Heart sounds: Normal heart sounds.   Pulmonary:      Effort: Pulmonary effort is normal.      Breath sounds: Normal breath sounds.   Abdominal:      General: Abdomen is flat. Bowel sounds are normal.      Palpations: Abdomen is soft.      Tenderness: There is no abdominal tenderness.   Genitourinary:     Vagina: Bleeding present.      Uterus: Enlarged.       Adnexa: Right adnexa normal and left adnexa normal.   Musculoskeletal:         General: Normal range of motion.   Skin:     General: Skin is warm and dry.      Capillary Refill: Capillary refill takes less than 2 seconds.   Neurological:      General: No focal deficit present.      Mental Status: She is alert and oriented to person, place, and time.             LAB:  All pertinent labs reviewed and interpreted.  Labs Ordered and Resulted from Time of ED  Arrival to Time of ED Departure   ROUTINE UA WITH MICROSCOPIC REFLEX TO CULTURE - Abnormal       Result Value    Color Urine Colorless      Appearance Urine Clear      Glucose Urine Negative      Bilirubin Urine Negative      Ketones Urine Negative      Specific Gravity Urine 1.004      Blood Urine 1.0 mg/dL (*)     pH Urine 6.0      Protein Albumin Urine Negative      Urobilinogen Urine Normal      Nitrite Urine Negative      Leukocyte Esterase Urine Negative      Mucus Urine Present (*)     RBC Urine 2      WBC Urine 1      Squamous Epithelials Urine <1     BASIC METABOLIC PANEL - Abnormal    Sodium 134 (*)     Potassium 3.7      Chloride 102      Carbon Dioxide (CO2) 21 (*)     Anion Gap 11      Urea Nitrogen 4.9 (*)     Creatinine 0.49 (*)     GFR Estimate >90      Calcium 9.2      Glucose 66 (*)    CBC WITH PLATELETS AND DIFFERENTIAL - Abnormal    WBC Count 7.7      RBC Count 3.61 (*)     Hemoglobin 11.4 (*)     Hematocrit 32.5 (*)     MCV 90      MCH 31.6      MCHC 35.1      RDW 13.8      Platelet Count 145 (*)     % Neutrophils 55      % Lymphocytes 31      % Monocytes 9      % Eosinophils 4      % Basophils 0      % Immature Granulocytes 0      NRBCs per 100 WBC 0      Absolute Neutrophils 4.3      Absolute Lymphocytes 2.4      Absolute Monocytes 0.7      Absolute Eosinophils 0.3      Absolute Basophils 0.0      Absolute Immature Granulocytes 0.0      Absolute NRBCs 0.0     WET PREPARATION - Normal    Trichomonas Absent      Yeast Absent      Clue Cells Absent      WBCs/high power field None     TYPE AND SCREEN, ADULT    ABO/RH(D) B POS      Antibody Screen Negative      SPECIMEN EXPIRATION DATE 7/11/2025 11:59:00 PM CDT     ABO/RH TYPE AND SCREEN       RADIOLOGY:  Reviewed all pertinent imaging. Please see official radiology report.  US OB >= 14 Weeks   Final Result   IMPRESSION:     1.  Single intrauterine gestation.   2.  Based on prior dating, composite age of 14 weeks 6 days with EDC 12/31/2025.   3.   Normal interval growth.               I, Romi Castellanos, am serving as a scribe to document services personally performed by Dr. Yoli Cruz based on my observation and the provider's statements to me. I, Yoli Cruz, DO attest that Romi Castellanos is acting in a scribe capacity, has observed my performance of the services and has documented them in accordance with my direction.    Yoli Cruz DO  Emergency Medicine  [facility] EMERGENCY ROOM  [facility] [facility] [id number]  [phone]  Dept: [phone]       Yoli Cruz DO  07/08/25 2233

## 2025-07-09 NOTE — ED NOTES
Pt discharging via POV with self. All belongings with patient. Discharge instructions provided and questions encouraged. Denies pain. VSS on RA. Pt left in stable condition.

## 2025-07-16 ENCOUNTER — TRANSFERRED RECORDS (OUTPATIENT)
Dept: HEALTH INFORMATION MANAGEMENT | Facility: CLINIC | Age: 35
End: 2025-07-16
Payer: COMMERCIAL

## 2025-07-18 ENCOUNTER — MEDICAL CORRESPONDENCE (OUTPATIENT)
Dept: HEALTH INFORMATION MANAGEMENT | Facility: CLINIC | Age: 35
End: 2025-07-18

## 2025-07-21 ENCOUNTER — TELEPHONE (OUTPATIENT)
Dept: MATERNAL FETAL MEDICINE | Facility: CLINIC | Age: 35
End: 2025-07-21
Payer: COMMERCIAL

## 2025-07-21 NOTE — TELEPHONE ENCOUNTER
LVM with [facility] triage team to notify them of patient declining new referral including genetic and MFM consults. RN discussed rationale with Gil via telephone for her clinic's new request for her to be seen sooner and with additional consults. Patient verbalizes understanding of recommendations and plans to keep original MFM appointment of L2 only on 8/8.

## 2025-07-31 ENCOUNTER — PRE VISIT (OUTPATIENT)
Dept: MATERNAL FETAL MEDICINE | Facility: HOSPITAL | Age: 35
End: 2025-07-31
Payer: COMMERCIAL

## 2025-08-04 ENCOUNTER — HOSPITAL ENCOUNTER (OUTPATIENT)
Facility: CLINIC | Age: 35
Setting detail: OBSERVATION
Discharge: HOME | End: 2025-08-05
Attending: OBSTETRICS & GYNECOLOGY | Admitting: OBSTETRICS & GYNECOLOGY
Payer: COMMERCIAL

## 2025-08-04 DIAGNOSIS — O42.919 PRETERM PREMATURE RUPTURE OF MEMBRANES (PPROM) WITH UNKNOWN ONSET OF LABOR: Primary | ICD-10-CM

## 2025-08-04 LAB
ABO + RH BLD: NORMAL
ALBUMIN SERPL BCG-MCNC: 4.1 G/DL (ref 3.5–5.2)
ALP SERPL-CCNC: 57 U/L (ref 40–150)
ALT SERPL W P-5'-P-CCNC: 14 U/L (ref 0–50)
ANION GAP SERPL CALCULATED.3IONS-SCNC: 16 MMOL/L (ref 7–15)
APTT PPP: 26 SECONDS (ref 22–38)
APTT PPP: 26 SECONDS (ref 22–38)
AST SERPL W P-5'-P-CCNC: 24 U/L (ref 0–45)
BASOPHILS # BLD AUTO: 0 10E3/UL (ref 0–0.2)
BASOPHILS NFR BLD AUTO: 1 %
BILIRUB SERPL-MCNC: <0.2 MG/DL
BLD GP AB SCN SERPL QL: NEGATIVE
BUN SERPL-MCNC: 5.3 MG/DL (ref 6–20)
CALCIUM SERPL-MCNC: 9.2 MG/DL (ref 8.8–10.4)
CHLORIDE SERPL-SCNC: 100 MMOL/L (ref 98–107)
CLUE CELLS: ABNORMAL
CREAT SERPL-MCNC: 0.53 MG/DL (ref 0.51–0.95)
CRYSTALS AMN MICRO: NORMAL
EGFRCR SERPLBLD CKD-EPI 2021: >90 ML/MIN/1.73M2
EOSINOPHIL # BLD AUTO: 0.2 10E3/UL (ref 0–0.7)
EOSINOPHIL NFR BLD AUTO: 4 %
ERYTHROCYTE [DISTWIDTH] IN BLOOD BY AUTOMATED COUNT: 14.7 % (ref 10–15)
ERYTHROCYTE [DISTWIDTH] IN BLOOD BY AUTOMATED COUNT: 15 % (ref 10–15)
FIBRINOGEN PPP-MCNC: 266 MG/DL (ref 170–510)
FIBRINOGEN PPP-MCNC: 276 MG/DL (ref 170–510)
GLUCOSE SERPL-MCNC: 75 MG/DL (ref 70–99)
HCO3 SERPL-SCNC: 21 MMOL/L (ref 22–29)
HCT VFR BLD AUTO: 31.2 % (ref 35–47)
HCT VFR BLD AUTO: 36.7 % (ref 35–47)
HGB BLD-MCNC: 10.9 G/DL (ref 11.7–15.7)
HGB BLD-MCNC: 12.6 G/DL (ref 11.7–15.7)
HOLD SPECIMEN: NORMAL
IMM GRANULOCYTES # BLD: 0 10E3/UL
IMM GRANULOCYTES NFR BLD: 0 %
INR PPP: 0.88 (ref 0.85–1.15)
INR PPP: 0.92 (ref 0.85–1.15)
LYMPHOCYTES # BLD AUTO: 2.3 10E3/UL (ref 0.8–5.3)
LYMPHOCYTES NFR BLD AUTO: 48 %
MCH RBC QN AUTO: 31.8 PG (ref 26.5–33)
MCH RBC QN AUTO: 32.1 PG (ref 26.5–33)
MCHC RBC AUTO-ENTMCNC: 34.3 G/DL (ref 31.5–36.5)
MCHC RBC AUTO-ENTMCNC: 34.9 G/DL (ref 31.5–36.5)
MCV RBC AUTO: 92 FL (ref 78–100)
MCV RBC AUTO: 93 FL (ref 78–100)
MONOCYTES # BLD AUTO: 0.6 10E3/UL (ref 0–1.3)
MONOCYTES NFR BLD AUTO: 13 %
NEUTROPHILS # BLD AUTO: 1.6 10E3/UL (ref 1.6–8.3)
NEUTROPHILS NFR BLD AUTO: 34 %
NRBC # BLD AUTO: 0 10E3/UL
NRBC BLD AUTO-RTO: 0 /100
PLATELET # BLD AUTO: 136 10E3/UL (ref 150–450)
PLATELET # BLD AUTO: 168 10E3/UL (ref 150–450)
POTASSIUM SERPL-SCNC: 3.8 MMOL/L (ref 3.4–5.3)
PROT SERPL-MCNC: 7 G/DL (ref 6.4–8.3)
PROTHROMBIN TIME: 12.3 SECONDS (ref 11.8–14.8)
PROTHROMBIN TIME: 12.8 SECONDS (ref 11.8–14.8)
RBC # BLD AUTO: 3.4 10E6/UL (ref 3.8–5.2)
RBC # BLD AUTO: 3.96 10E6/UL (ref 3.8–5.2)
SODIUM SERPL-SCNC: 137 MMOL/L (ref 135–145)
SPECIMEN EXP DATE BLD: NORMAL
TRICHOMONAS, WET PREP: ABNORMAL
WBC # BLD AUTO: 4.7 10E3/UL (ref 4–11)
WBC # BLD AUTO: 4.9 10E3/UL (ref 4–11)
WBC'S/HIGH POWER FIELD, WET PREP: ABNORMAL
YEAST, WET PREP: ABNORMAL

## 2025-08-04 PROCEDURE — 86901 BLOOD TYPING SEROLOGIC RH(D): CPT | Performed by: STUDENT IN AN ORGANIZED HEALTH CARE EDUCATION/TRAINING PROGRAM

## 2025-08-04 PROCEDURE — 250N000013 HC RX MED GY IP 250 OP 250 PS 637: Performed by: OBSTETRICS & GYNECOLOGY

## 2025-08-04 PROCEDURE — 85384 FIBRINOGEN ACTIVITY: CPT

## 2025-08-04 PROCEDURE — 85730 THROMBOPLASTIN TIME PARTIAL: CPT

## 2025-08-04 PROCEDURE — 85004 AUTOMATED DIFF WBC COUNT: CPT | Performed by: STUDENT IN AN ORGANIZED HEALTH CARE EDUCATION/TRAINING PROGRAM

## 2025-08-04 PROCEDURE — G0463 HOSPITAL OUTPT CLINIC VISIT: HCPCS | Mod: 25

## 2025-08-04 PROCEDURE — 85384 FIBRINOGEN ACTIVITY: CPT | Performed by: STUDENT IN AN ORGANIZED HEALTH CARE EDUCATION/TRAINING PROGRAM

## 2025-08-04 PROCEDURE — 84443 ASSAY THYROID STIM HORMONE: CPT

## 2025-08-04 PROCEDURE — 36415 COLL VENOUS BLD VENIPUNCTURE: CPT | Performed by: STUDENT IN AN ORGANIZED HEALTH CARE EDUCATION/TRAINING PROGRAM

## 2025-08-04 PROCEDURE — 85610 PROTHROMBIN TIME: CPT

## 2025-08-04 PROCEDURE — 85610 PROTHROMBIN TIME: CPT | Performed by: STUDENT IN AN ORGANIZED HEALTH CARE EDUCATION/TRAINING PROGRAM

## 2025-08-04 PROCEDURE — 250N000013 HC RX MED GY IP 250 OP 250 PS 637: Performed by: STUDENT IN AN ORGANIZED HEALTH CARE EDUCATION/TRAINING PROGRAM

## 2025-08-04 PROCEDURE — 36415 COLL VENOUS BLD VENIPUNCTURE: CPT

## 2025-08-04 PROCEDURE — 84155 ASSAY OF PROTEIN SERUM: CPT | Performed by: STUDENT IN AN ORGANIZED HEALTH CARE EDUCATION/TRAINING PROGRAM

## 2025-08-04 PROCEDURE — 85014 HEMATOCRIT: CPT

## 2025-08-04 PROCEDURE — 85730 THROMBOPLASTIN TIME PARTIAL: CPT | Performed by: STUDENT IN AN ORGANIZED HEALTH CARE EDUCATION/TRAINING PROGRAM

## 2025-08-04 PROCEDURE — 120N000002 HC R&B MED SURG/OB UMMC

## 2025-08-04 PROCEDURE — 87210 SMEAR WET MOUNT SALINE/INK: CPT | Performed by: STUDENT IN AN ORGANIZED HEALTH CARE EDUCATION/TRAINING PROGRAM

## 2025-08-04 PROCEDURE — 87491 CHLMYD TRACH DNA AMP PROBE: CPT | Performed by: STUDENT IN AN ORGANIZED HEALTH CARE EDUCATION/TRAINING PROGRAM

## 2025-08-04 RX ORDER — ONDANSETRON 2 MG/ML
4 INJECTION INTRAMUSCULAR; INTRAVENOUS EVERY 6 HOURS PRN
Status: DISCONTINUED | OUTPATIENT
Start: 2025-08-04 | End: 2025-08-05 | Stop reason: HOSPADM

## 2025-08-04 RX ORDER — METOCLOPRAMIDE 10 MG/1
10 TABLET ORAL EVERY 6 HOURS PRN
Status: DISCONTINUED | OUTPATIENT
Start: 2025-08-04 | End: 2025-08-05 | Stop reason: HOSPADM

## 2025-08-04 RX ORDER — ACETAMINOPHEN 325 MG/1
650 TABLET ORAL EVERY 4 HOURS PRN
Status: DISCONTINUED | OUTPATIENT
Start: 2025-08-04 | End: 2025-08-05 | Stop reason: HOSPADM

## 2025-08-04 RX ORDER — METOCLOPRAMIDE HYDROCHLORIDE 5 MG/ML
10 INJECTION INTRAMUSCULAR; INTRAVENOUS EVERY 6 HOURS PRN
Status: DISCONTINUED | OUTPATIENT
Start: 2025-08-04 | End: 2025-08-05 | Stop reason: HOSPADM

## 2025-08-04 RX ORDER — DOCUSATE SODIUM 100 MG/1
100 CAPSULE, LIQUID FILLED ORAL 2 TIMES DAILY
Status: DISCONTINUED | OUTPATIENT
Start: 2025-08-04 | End: 2025-08-05 | Stop reason: HOSPADM

## 2025-08-04 RX ORDER — PRENATAL VIT/IRON FUM/FOLIC AC 27MG-0.8MG
1 TABLET ORAL AT BEDTIME
Status: DISCONTINUED | OUTPATIENT
Start: 2025-08-04 | End: 2025-08-05 | Stop reason: HOSPADM

## 2025-08-04 RX ORDER — DIPHENHYDRAMINE HYDROCHLORIDE 50 MG/ML
25 INJECTION, SOLUTION INTRAMUSCULAR; INTRAVENOUS EVERY 6 HOURS PRN
Status: DISCONTINUED | OUTPATIENT
Start: 2025-08-04 | End: 2025-08-05 | Stop reason: HOSPADM

## 2025-08-04 RX ORDER — LIDOCAINE 40 MG/G
CREAM TOPICAL
Status: DISCONTINUED | OUTPATIENT
Start: 2025-08-04 | End: 2025-08-05 | Stop reason: HOSPADM

## 2025-08-04 RX ORDER — LACTOBACILLUS COMBINATION NO.9 4B CELL
1 CAPSULE ORAL AT BEDTIME
Status: DISCONTINUED | OUTPATIENT
Start: 2025-08-04 | End: 2025-08-04

## 2025-08-04 RX ORDER — CALCIUM CARBONATE 500 MG/1
1000 TABLET, CHEWABLE ORAL EVERY 6 HOURS PRN
Status: DISCONTINUED | OUTPATIENT
Start: 2025-08-04 | End: 2025-08-05 | Stop reason: HOSPADM

## 2025-08-04 RX ORDER — FLUTICASONE FUROATE AND VILANTEROL 100; 25 UG/1; UG/1
1 POWDER RESPIRATORY (INHALATION) DAILY
Status: DISCONTINUED | OUTPATIENT
Start: 2025-08-05 | End: 2025-08-05 | Stop reason: HOSPADM

## 2025-08-04 RX ORDER — ONDANSETRON 4 MG/1
4 TABLET, ORALLY DISINTEGRATING ORAL EVERY 6 HOURS PRN
Status: DISCONTINUED | OUTPATIENT
Start: 2025-08-04 | End: 2025-08-05 | Stop reason: HOSPADM

## 2025-08-04 RX ORDER — PROCHLORPERAZINE MALEATE 10 MG
10 TABLET ORAL EVERY 6 HOURS PRN
Status: DISCONTINUED | OUTPATIENT
Start: 2025-08-04 | End: 2025-08-05 | Stop reason: HOSPADM

## 2025-08-04 RX ORDER — FLUTICASONE PROPIONATE AND SALMETEROL 250; 50 UG/1; UG/1
1 POWDER RESPIRATORY (INHALATION) EVERY 12 HOURS
Status: DISCONTINUED | OUTPATIENT
Start: 2025-08-04 | End: 2025-08-04

## 2025-08-04 RX ORDER — DIPHENHYDRAMINE HCL 25 MG
25 CAPSULE ORAL EVERY 6 HOURS PRN
Status: DISCONTINUED | OUTPATIENT
Start: 2025-08-04 | End: 2025-08-05 | Stop reason: HOSPADM

## 2025-08-04 RX ADMIN — DOXYLAMINE SUCCINATE 25 MG: 25 TABLET ORAL at 20:37

## 2025-08-04 RX ADMIN — FLUTICASONE FUROATE AND VILANTEROL TRIFENATATE 1 PUFF: 100; 25 POWDER RESPIRATORY (INHALATION) at 20:33

## 2025-08-04 ASSESSMENT — ACTIVITIES OF DAILY LIVING (ADL)
ADLS_ACUITY_SCORE: 26

## 2025-08-05 ENCOUNTER — TELEPHONE (OUTPATIENT)
Dept: MATERNAL FETAL MEDICINE | Facility: CLINIC | Age: 35
End: 2025-08-05
Payer: COMMERCIAL

## 2025-08-05 ENCOUNTER — DOCUMENTATION ONLY (OUTPATIENT)
Dept: CONSULT | Facility: CLINIC | Age: 35
End: 2025-08-05
Payer: COMMERCIAL

## 2025-08-05 ENCOUNTER — APPOINTMENT (OUTPATIENT)
Dept: ULTRASOUND IMAGING | Facility: CLINIC | Age: 35
End: 2025-08-05
Attending: STUDENT IN AN ORGANIZED HEALTH CARE EDUCATION/TRAINING PROGRAM
Payer: COMMERCIAL

## 2025-08-05 VITALS
DIASTOLIC BLOOD PRESSURE: 80 MMHG | RESPIRATION RATE: 16 BRPM | WEIGHT: 149 LBS | TEMPERATURE: 98.5 F | HEIGHT: 65 IN | HEART RATE: 71 BPM | BODY MASS INDEX: 24.83 KG/M2 | SYSTOLIC BLOOD PRESSURE: 130 MMHG

## 2025-08-05 LAB
APTT PPP: 26 SECONDS (ref 22–38)
BASOPHILS # BLD AUTO: 0 10E3/UL (ref 0–0.2)
BASOPHILS NFR BLD AUTO: 1 %
C TRACH DNA SPEC QL PROBE+SIG AMP: NEGATIVE
EOSINOPHIL # BLD AUTO: 0.2 10E3/UL (ref 0–0.7)
EOSINOPHIL NFR BLD AUTO: 5 %
ERYTHROCYTE [DISTWIDTH] IN BLOOD BY AUTOMATED COUNT: 14.9 % (ref 10–15)
FIBRINOGEN PPP-MCNC: 227 MG/DL (ref 170–510)
HCT VFR BLD AUTO: 30.9 % (ref 35–47)
HGB BLD-MCNC: 10.7 G/DL (ref 11.7–15.7)
IMM GRANULOCYTES # BLD: 0 10E3/UL
IMM GRANULOCYTES NFR BLD: 1 %
INR PPP: 0.9 (ref 0.85–1.15)
LYMPHOCYTES # BLD AUTO: 2 10E3/UL (ref 0.8–5.3)
LYMPHOCYTES NFR BLD AUTO: 48 %
MCH RBC QN AUTO: 31.8 PG (ref 26.5–33)
MCHC RBC AUTO-ENTMCNC: 34.6 G/DL (ref 31.5–36.5)
MCV RBC AUTO: 92 FL (ref 78–100)
MONOCYTES # BLD AUTO: 0.6 10E3/UL (ref 0–1.3)
MONOCYTES NFR BLD AUTO: 14 %
N GONORRHOEA DNA SPEC QL NAA+PROBE: NEGATIVE
NEUTROPHILS # BLD AUTO: 1.3 10E3/UL (ref 1.6–8.3)
NEUTROPHILS NFR BLD AUTO: 32 %
NRBC # BLD AUTO: 0 10E3/UL
NRBC BLD AUTO-RTO: 0 /100
PLATELET # BLD AUTO: 130 10E3/UL (ref 150–450)
PROTHROMBIN TIME: 12.5 SECONDS (ref 11.8–14.8)
RBC # BLD AUTO: 3.37 10E6/UL (ref 3.8–5.2)
SPECIMEN TYPE: NORMAL
TSH SERPL DL<=0.005 MIU/L-ACNC: 2.03 UIU/ML (ref 0.3–4.2)
WBC # BLD AUTO: 4.1 10E3/UL (ref 4–11)

## 2025-08-05 PROCEDURE — 76820 UMBILICAL ARTERY ECHO: CPT

## 2025-08-05 PROCEDURE — 120N000002 HC R&B MED SURG/OB UMMC

## 2025-08-05 PROCEDURE — 85004 AUTOMATED DIFF WBC COUNT: CPT | Performed by: STUDENT IN AN ORGANIZED HEALTH CARE EDUCATION/TRAINING PROGRAM

## 2025-08-05 PROCEDURE — 85730 THROMBOPLASTIN TIME PARTIAL: CPT

## 2025-08-05 PROCEDURE — 85610 PROTHROMBIN TIME: CPT

## 2025-08-05 PROCEDURE — 250N000013 HC RX MED GY IP 250 OP 250 PS 637: Performed by: OBSTETRICS & GYNECOLOGY

## 2025-08-05 PROCEDURE — 76811 OB US DETAILED SNGL FETUS: CPT | Mod: 26 | Performed by: OBSTETRICS & GYNECOLOGY

## 2025-08-05 PROCEDURE — 85384 FIBRINOGEN ACTIVITY: CPT

## 2025-08-05 PROCEDURE — 99236 HOSP IP/OBS SAME DATE HI 85: CPT | Mod: 25 | Performed by: OBSTETRICS & GYNECOLOGY

## 2025-08-05 PROCEDURE — 36415 COLL VENOUS BLD VENIPUNCTURE: CPT

## 2025-08-05 PROCEDURE — 76820 UMBILICAL ARTERY ECHO: CPT | Mod: 26 | Performed by: OBSTETRICS & GYNECOLOGY

## 2025-08-05 RX ADMIN — FLUTICASONE FUROATE AND VILANTEROL TRIFENATATE 1 PUFF: 100; 25 POWDER RESPIRATORY (INHALATION) at 09:00

## 2025-08-05 RX ADMIN — PRENATAL VIT W/ FE FUMARATE-FA TAB 27-0.8 MG 1 TABLET: 27-0.8 TAB at 08:59

## 2025-08-05 ASSESSMENT — ACTIVITIES OF DAILY LIVING (ADL)
ADLS_ACUITY_SCORE: 26

## 2025-08-07 ENCOUNTER — TELEPHONE (OUTPATIENT)
Dept: MATERNAL FETAL MEDICINE | Facility: CLINIC | Age: 35
End: 2025-08-07

## 2025-08-07 ENCOUNTER — HOSPITAL ENCOUNTER (INPATIENT)
Facility: CLINIC | Age: 35
LOS: 1 days | Discharge: HOME | End: 2025-08-08
Attending: STUDENT IN AN ORGANIZED HEALTH CARE EDUCATION/TRAINING PROGRAM | Admitting: INTERNAL MEDICINE
Payer: COMMERCIAL

## 2025-08-07 PROBLEM — O42.90 ANTEPARTUM PREMATURE RUPTURE OF MEMBRANE: Status: ACTIVE | Noted: 2025-08-07

## 2025-08-07 ASSESSMENT — ACTIVITIES OF DAILY LIVING (ADL)
ADLS_ACUITY_SCORE: 29

## 2025-08-08 ASSESSMENT — ACTIVITIES OF DAILY LIVING (ADL)
ADLS_ACUITY_SCORE: 31
ADLS_ACUITY_SCORE: 31
DIFFICULTY_COMMUNICATING: NO
TOILETING_ISSUES: NO
CHANGE_IN_FUNCTIONAL_STATUS_SINCE_ONSET_OF_CURRENT_ILLNESS/INJURY: NO
FALL_HISTORY_WITHIN_LAST_SIX_MONTHS: NO
ADLS_ACUITY_SCORE: 31
DIFFICULTY_EATING/SWALLOWING: NO
DRESSING/BATHING_DIFFICULTY: NO
CONCENTRATING,_REMEMBERING_OR_MAKING_DECISIONS_DIFFICULTY: NO
ADLS_ACUITY_SCORE: 31
ADLS_ACUITY_SCORE: 31
ADLS_ACUITY_SCORE: 32
WALKING_OR_CLIMBING_STAIRS_DIFFICULTY: NO
HEARING_DIFFICULTY_OR_DEAF: NO
ADLS_ACUITY_SCORE: 31
WEAR_GLASSES_OR_BLIND: NO
DOING_ERRANDS_INDEPENDENTLY_DIFFICULTY: NO
ADLS_ACUITY_SCORE: 31

## 2025-08-11 ENCOUNTER — PATIENT OUTREACH (OUTPATIENT)
Dept: CARE COORDINATION | Facility: CLINIC | Age: 35
End: 2025-08-11
Payer: COMMERCIAL

## 2025-08-29 ENCOUNTER — TELEPHONE (OUTPATIENT)
Dept: OBGYN | Facility: CLINIC | Age: 35
End: 2025-08-29
Payer: COMMERCIAL

## (undated) DEVICE — GLOVE PROTEXIS BLUE W/NEU-THERA 6.5  2D73EB65

## (undated) DEVICE — SOL NACL 0.9% IRRIG 1000ML BOTTLE 07138-09

## (undated) DEVICE — SU MONOCRYL 0 CT-1 36" Y346H

## (undated) DEVICE — STRAP KNEE/BODY 31143004

## (undated) DEVICE — SU VICRYL 3-0 CTX 36" UND J980H

## (undated) DEVICE — ESU GROUND PAD UNIVERSAL W/O CORD

## (undated) DEVICE — SOL WATER IRRIG 1000ML BOTTLE 07139-09

## (undated) DEVICE — SU VICRYL 0 CT-1 36" J346H

## (undated) DEVICE — STOCKING SLEEVE COMPRESSION CALF LG

## (undated) DEVICE — CATH TRAY FOLEY 16FR BARDEX W/DRAIN BAG STATLOCK 300316A

## (undated) DEVICE — SU MONOCRYL 4-0 PS-2 18" UND Y496G

## (undated) DEVICE — PREP CHLORAPREP 26ML TINTED ORANGE  260815

## (undated) DEVICE — PACK C-SECTION LF PL15OTA83B

## (undated) DEVICE — DRSG ABDOMINAL 07 1/2X8" 7197D

## (undated) DEVICE — GLOVE ESTEEM POWDER FREE SMT 6.5  2D72PT65

## (undated) RX ORDER — FENTANYL CITRATE 50 UG/ML
INJECTION, SOLUTION INTRAMUSCULAR; INTRAVENOUS
Status: DISPENSED
Start: 2022-08-03

## (undated) RX ORDER — MORPHINE SULFATE 2 MG/ML
INJECTION, SOLUTION INTRAMUSCULAR; INTRAVENOUS
Status: DISPENSED
Start: 2022-08-03